# Patient Record
Sex: FEMALE | Race: WHITE | Employment: OTHER | URBAN - METROPOLITAN AREA
[De-identification: names, ages, dates, MRNs, and addresses within clinical notes are randomized per-mention and may not be internally consistent; named-entity substitution may affect disease eponyms.]

---

## 2018-05-10 ENCOUNTER — APPOINTMENT (OUTPATIENT)
Dept: GENERAL RADIOLOGY | Age: 73
DRG: 189 | End: 2018-05-10
Attending: EMERGENCY MEDICINE
Payer: MEDICARE

## 2018-05-10 ENCOUNTER — HOSPITAL ENCOUNTER (INPATIENT)
Age: 73
LOS: 5 days | Discharge: HOME | DRG: 189 | End: 2018-05-15
Attending: EMERGENCY MEDICINE | Admitting: INTERNAL MEDICINE
Payer: MEDICARE

## 2018-05-10 DIAGNOSIS — J44.1 ACUTE EXACERBATION OF CHRONIC OBSTRUCTIVE PULMONARY DISEASE (COPD) (HCC): ICD-10-CM

## 2018-05-10 DIAGNOSIS — J96.01 ACUTE RESPIRATORY FAILURE WITH HYPOXIA (HCC): Primary | ICD-10-CM

## 2018-05-10 PROBLEM — J96.90 RESPIRATORY FAILURE (HCC): Status: ACTIVE | Noted: 2018-05-10

## 2018-05-10 LAB
ALBUMIN SERPL-MCNC: 3.6 G/DL (ref 3.5–5)
ALBUMIN/GLOB SERPL: 0.9 {RATIO} (ref 1.1–2.2)
ALP SERPL-CCNC: 75 U/L (ref 45–117)
ALT SERPL-CCNC: 26 U/L (ref 12–78)
ANION GAP SERPL CALC-SCNC: 7 MMOL/L (ref 5–15)
AST SERPL-CCNC: 29 U/L (ref 15–37)
ATRIAL RATE: 98 BPM
BASOPHILS # BLD: 0 K/UL (ref 0–0.1)
BASOPHILS NFR BLD: 1 % (ref 0–1)
BILIRUB SERPL-MCNC: 0.6 MG/DL (ref 0.2–1)
BNP SERPL-MCNC: 46 PG/ML (ref 0–125)
BUN SERPL-MCNC: 28 MG/DL (ref 6–20)
BUN/CREAT SERPL: 29 (ref 12–20)
CALCIUM SERPL-MCNC: 9.1 MG/DL (ref 8.5–10.1)
CALCULATED P AXIS, ECG09: 118 DEGREES
CALCULATED R AXIS, ECG10: 35 DEGREES
CALCULATED T AXIS, ECG11: 56 DEGREES
CHLORIDE SERPL-SCNC: 106 MMOL/L (ref 97–108)
CK MB CFR SERPL CALC: 1.6 % (ref 0–2.5)
CK MB SERPL-MCNC: 2.6 NG/ML (ref 5–25)
CK SERPL-CCNC: 160 U/L (ref 26–192)
CO2 SERPL-SCNC: 24 MMOL/L (ref 21–32)
CREAT SERPL-MCNC: 0.96 MG/DL (ref 0.55–1.02)
DIAGNOSIS, 93000: NORMAL
DIFFERENTIAL METHOD BLD: NORMAL
EOSINOPHIL # BLD: 0.4 K/UL (ref 0–0.4)
EOSINOPHIL NFR BLD: 5 % (ref 0–7)
ERYTHROCYTE [DISTWIDTH] IN BLOOD BY AUTOMATED COUNT: 11.9 % (ref 11.5–14.5)
EST. AVERAGE GLUCOSE BLD GHB EST-MCNC: 157 MG/DL
GLOBULIN SER CALC-MCNC: 3.8 G/DL (ref 2–4)
GLUCOSE BLD STRIP.AUTO-MCNC: 282 MG/DL (ref 65–100)
GLUCOSE BLD STRIP.AUTO-MCNC: 339 MG/DL (ref 65–100)
GLUCOSE BLD STRIP.AUTO-MCNC: 401 MG/DL (ref 65–100)
GLUCOSE SERPL-MCNC: 271 MG/DL (ref 65–100)
HBA1C MFR BLD: 7.1 % (ref 4.2–6.3)
HCT VFR BLD AUTO: 37.6 % (ref 35–47)
HGB BLD-MCNC: 12.8 G/DL (ref 11.5–16)
IMM GRANULOCYTES # BLD: 0 K/UL (ref 0–0.04)
IMM GRANULOCYTES NFR BLD AUTO: 0 % (ref 0–0.5)
LYMPHOCYTES # BLD: 1.9 K/UL (ref 0.8–3.5)
LYMPHOCYTES NFR BLD: 24 % (ref 12–49)
MAGNESIUM SERPL-MCNC: 1.9 MG/DL (ref 1.6–2.4)
MCH RBC QN AUTO: 31.7 PG (ref 26–34)
MCHC RBC AUTO-ENTMCNC: 34 G/DL (ref 30–36.5)
MCV RBC AUTO: 93.1 FL (ref 80–99)
MONOCYTES # BLD: 0.5 K/UL (ref 0–1)
MONOCYTES NFR BLD: 6 % (ref 5–13)
NEUTS SEG # BLD: 5 K/UL (ref 1.8–8)
NEUTS SEG NFR BLD: 64 % (ref 32–75)
NRBC # BLD: 0 K/UL (ref 0–0.01)
NRBC BLD-RTO: 0 PER 100 WBC
P-R INTERVAL, ECG05: 128 MS
PLATELET # BLD AUTO: 155 K/UL (ref 150–400)
PMV BLD AUTO: 10.5 FL (ref 8.9–12.9)
POTASSIUM SERPL-SCNC: 4.6 MMOL/L (ref 3.5–5.1)
PROT SERPL-MCNC: 7.4 G/DL (ref 6.4–8.2)
Q-T INTERVAL, ECG07: 350 MS
QRS DURATION, ECG06: 84 MS
QTC CALCULATION (BEZET), ECG08: 446 MS
RBC # BLD AUTO: 4.04 M/UL (ref 3.8–5.2)
SERVICE CMNT-IMP: ABNORMAL
SODIUM SERPL-SCNC: 137 MMOL/L (ref 136–145)
TROPONIN I SERPL-MCNC: <0.04 NG/ML
VENTRICULAR RATE, ECG03: 98 BPM
WBC # BLD AUTO: 7.8 K/UL (ref 3.6–11)

## 2018-05-10 PROCEDURE — 80053 COMPREHEN METABOLIC PANEL: CPT | Performed by: EMERGENCY MEDICINE

## 2018-05-10 PROCEDURE — 82962 GLUCOSE BLOOD TEST: CPT

## 2018-05-10 PROCEDURE — 77030029684 HC NEB SM VOL KT MONA -A

## 2018-05-10 PROCEDURE — 74011000250 HC RX REV CODE- 250: Performed by: INTERNAL MEDICINE

## 2018-05-10 PROCEDURE — 71045 X-RAY EXAM CHEST 1 VIEW: CPT

## 2018-05-10 PROCEDURE — 94640 AIRWAY INHALATION TREATMENT: CPT

## 2018-05-10 PROCEDURE — 96374 THER/PROPH/DIAG INJ IV PUSH: CPT

## 2018-05-10 PROCEDURE — 99285 EMERGENCY DEPT VISIT HI MDM: CPT

## 2018-05-10 PROCEDURE — 84484 ASSAY OF TROPONIN QUANT: CPT | Performed by: EMERGENCY MEDICINE

## 2018-05-10 PROCEDURE — 74011000250 HC RX REV CODE- 250: Performed by: EMERGENCY MEDICINE

## 2018-05-10 PROCEDURE — 36415 COLL VENOUS BLD VENIPUNCTURE: CPT | Performed by: EMERGENCY MEDICINE

## 2018-05-10 PROCEDURE — 74011250636 HC RX REV CODE- 250/636: Performed by: INTERNAL MEDICINE

## 2018-05-10 PROCEDURE — 77010033678 HC OXYGEN DAILY

## 2018-05-10 PROCEDURE — 93005 ELECTROCARDIOGRAM TRACING: CPT

## 2018-05-10 PROCEDURE — 83735 ASSAY OF MAGNESIUM: CPT | Performed by: EMERGENCY MEDICINE

## 2018-05-10 PROCEDURE — 74011636637 HC RX REV CODE- 636/637: Performed by: INTERNAL MEDICINE

## 2018-05-10 PROCEDURE — 74011250637 HC RX REV CODE- 250/637: Performed by: INTERNAL MEDICINE

## 2018-05-10 PROCEDURE — 74011250636 HC RX REV CODE- 250/636: Performed by: EMERGENCY MEDICINE

## 2018-05-10 PROCEDURE — 83036 HEMOGLOBIN GLYCOSYLATED A1C: CPT | Performed by: INTERNAL MEDICINE

## 2018-05-10 PROCEDURE — 83880 ASSAY OF NATRIURETIC PEPTIDE: CPT | Performed by: EMERGENCY MEDICINE

## 2018-05-10 PROCEDURE — 85025 COMPLETE CBC W/AUTO DIFF WBC: CPT | Performed by: EMERGENCY MEDICINE

## 2018-05-10 PROCEDURE — 82550 ASSAY OF CK (CPK): CPT | Performed by: EMERGENCY MEDICINE

## 2018-05-10 PROCEDURE — 65660000000 HC RM CCU STEPDOWN

## 2018-05-10 RX ORDER — FLUTICASONE FUROATE AND VILANTEROL 200; 25 UG/1; UG/1
1 POWDER RESPIRATORY (INHALATION) DAILY
Status: DISCONTINUED | OUTPATIENT
Start: 2018-05-10 | End: 2018-05-15 | Stop reason: HOSPADM

## 2018-05-10 RX ORDER — GLIMEPIRIDE 1 MG/1
2 TABLET ORAL
Status: DISCONTINUED | OUTPATIENT
Start: 2018-05-10 | End: 2018-05-15 | Stop reason: HOSPADM

## 2018-05-10 RX ORDER — INSULIN LISPRO 100 [IU]/ML
INJECTION, SOLUTION INTRAVENOUS; SUBCUTANEOUS
Status: DISCONTINUED | OUTPATIENT
Start: 2018-05-10 | End: 2018-05-15 | Stop reason: HOSPADM

## 2018-05-10 RX ORDER — ALBUTEROL SULFATE 0.83 MG/ML
5 SOLUTION RESPIRATORY (INHALATION)
Status: COMPLETED | OUTPATIENT
Start: 2018-05-10 | End: 2018-05-10

## 2018-05-10 RX ORDER — ASPIRIN 81 MG/1
81 TABLET ORAL DAILY
Status: DISCONTINUED | OUTPATIENT
Start: 2018-05-11 | End: 2018-05-15 | Stop reason: HOSPADM

## 2018-05-10 RX ORDER — SERTRALINE HYDROCHLORIDE 50 MG/1
200 TABLET, FILM COATED ORAL DAILY
Status: DISCONTINUED | OUTPATIENT
Start: 2018-05-10 | End: 2018-05-10

## 2018-05-10 RX ORDER — LEVALBUTEROL INHALATION SOLUTION 1.25 MG/3ML
1.25 SOLUTION RESPIRATORY (INHALATION)
Status: DISCONTINUED | OUTPATIENT
Start: 2018-05-10 | End: 2018-05-13

## 2018-05-10 RX ORDER — LANOLIN ALCOHOL/MO/W.PET/CERES
1000 CREAM (GRAM) TOPICAL DAILY
COMMUNITY

## 2018-05-10 RX ORDER — ENOXAPARIN SODIUM 100 MG/ML
40 INJECTION SUBCUTANEOUS EVERY 24 HOURS
Status: DISCONTINUED | OUTPATIENT
Start: 2018-05-10 | End: 2018-05-15 | Stop reason: HOSPADM

## 2018-05-10 RX ORDER — ASPIRIN 325 MG
325 TABLET, DELAYED RELEASE (ENTERIC COATED) ORAL DAILY
Status: DISCONTINUED | OUTPATIENT
Start: 2018-05-10 | End: 2018-05-10 | Stop reason: DRUGHIGH

## 2018-05-10 RX ORDER — PIOGLITAZONEHYDROCHLORIDE 15 MG/1
15 TABLET ORAL DAILY
COMMUNITY
End: 2021-01-07

## 2018-05-10 RX ORDER — SODIUM CHLORIDE 0.9 % (FLUSH) 0.9 %
5-10 SYRINGE (ML) INJECTION AS NEEDED
Status: DISCONTINUED | OUTPATIENT
Start: 2018-05-10 | End: 2018-05-15 | Stop reason: HOSPADM

## 2018-05-10 RX ORDER — GLIPIZIDE 5 MG/1
10 TABLET ORAL
Status: DISCONTINUED | OUTPATIENT
Start: 2018-05-10 | End: 2018-05-10

## 2018-05-10 RX ORDER — NALOXONE HYDROCHLORIDE 0.4 MG/ML
0.4 INJECTION, SOLUTION INTRAMUSCULAR; INTRAVENOUS; SUBCUTANEOUS AS NEEDED
Status: DISCONTINUED | OUTPATIENT
Start: 2018-05-10 | End: 2018-05-15 | Stop reason: HOSPADM

## 2018-05-10 RX ORDER — SERTRALINE HYDROCHLORIDE 50 MG/1
150 TABLET, FILM COATED ORAL DAILY
Status: DISCONTINUED | OUTPATIENT
Start: 2018-05-11 | End: 2018-05-15 | Stop reason: HOSPADM

## 2018-05-10 RX ORDER — LISINOPRIL 5 MG/1
5 TABLET ORAL DAILY
Status: DISCONTINUED | OUTPATIENT
Start: 2018-05-10 | End: 2018-05-15 | Stop reason: HOSPADM

## 2018-05-10 RX ORDER — DOXYCYCLINE HYCLATE 100 MG
100 TABLET ORAL EVERY 12 HOURS
Status: DISCONTINUED | OUTPATIENT
Start: 2018-05-10 | End: 2018-05-10

## 2018-05-10 RX ORDER — FLUTICASONE FUROATE AND VILANTEROL 100; 25 UG/1; UG/1
1 POWDER RESPIRATORY (INHALATION) DAILY
Status: DISCONTINUED | OUTPATIENT
Start: 2018-05-10 | End: 2018-05-10 | Stop reason: SDUPTHER

## 2018-05-10 RX ORDER — ACETAMINOPHEN 325 MG/1
650 TABLET ORAL
Status: DISCONTINUED | OUTPATIENT
Start: 2018-05-10 | End: 2018-05-15 | Stop reason: HOSPADM

## 2018-05-10 RX ORDER — HYDRALAZINE HYDROCHLORIDE 20 MG/ML
10 INJECTION INTRAMUSCULAR; INTRAVENOUS
Status: DISCONTINUED | OUTPATIENT
Start: 2018-05-10 | End: 2018-05-15 | Stop reason: HOSPADM

## 2018-05-10 RX ORDER — IPRATROPIUM BROMIDE AND ALBUTEROL SULFATE 2.5; .5 MG/3ML; MG/3ML
3 SOLUTION RESPIRATORY (INHALATION)
Status: COMPLETED | OUTPATIENT
Start: 2018-05-10 | End: 2018-05-10

## 2018-05-10 RX ORDER — IPRATROPIUM BROMIDE AND ALBUTEROL SULFATE 2.5; .5 MG/3ML; MG/3ML
3 SOLUTION RESPIRATORY (INHALATION)
Status: DISCONTINUED | OUTPATIENT
Start: 2018-05-10 | End: 2018-05-10

## 2018-05-10 RX ORDER — MAGNESIUM SULFATE 100 %
4 CRYSTALS MISCELLANEOUS AS NEEDED
Status: DISCONTINUED | OUTPATIENT
Start: 2018-05-10 | End: 2018-05-15 | Stop reason: HOSPADM

## 2018-05-10 RX ORDER — ALPRAZOLAM 0.5 MG/1
0.5 TABLET ORAL
Status: DISCONTINUED | OUTPATIENT
Start: 2018-05-10 | End: 2018-05-15 | Stop reason: HOSPADM

## 2018-05-10 RX ORDER — GUAIFENESIN 600 MG/1
600 TABLET, EXTENDED RELEASE ORAL EVERY 12 HOURS
Status: DISCONTINUED | OUTPATIENT
Start: 2018-05-10 | End: 2018-05-14

## 2018-05-10 RX ORDER — DEXTROSE 50 % IN WATER (D50W) INTRAVENOUS SYRINGE
12.5-25 AS NEEDED
Status: DISCONTINUED | OUTPATIENT
Start: 2018-05-10 | End: 2018-05-15 | Stop reason: HOSPADM

## 2018-05-10 RX ORDER — SODIUM CHLORIDE 0.9 % (FLUSH) 0.9 %
5-10 SYRINGE (ML) INJECTION EVERY 8 HOURS
Status: DISCONTINUED | OUTPATIENT
Start: 2018-05-10 | End: 2018-05-15 | Stop reason: HOSPADM

## 2018-05-10 RX ORDER — PIOGLITAZONEHYDROCHLORIDE 15 MG/1
15 TABLET ORAL
Status: DISCONTINUED | OUTPATIENT
Start: 2018-05-11 | End: 2018-05-15 | Stop reason: HOSPADM

## 2018-05-10 RX ORDER — ASPIRIN 81 MG/1
81 TABLET ORAL DAILY
COMMUNITY

## 2018-05-10 RX ORDER — ATORVASTATIN CALCIUM 10 MG/1
20 TABLET, FILM COATED ORAL DAILY
Status: DISCONTINUED | OUTPATIENT
Start: 2018-05-10 | End: 2018-05-15 | Stop reason: HOSPADM

## 2018-05-10 RX ORDER — LEVOFLOXACIN 250 MG/1
250 TABLET ORAL EVERY 24 HOURS
Status: DISCONTINUED | OUTPATIENT
Start: 2018-05-11 | End: 2018-05-12 | Stop reason: DRUGHIGH

## 2018-05-10 RX ORDER — FACIAL-BODY WIPES
10 EACH TOPICAL DAILY PRN
Status: DISCONTINUED | OUTPATIENT
Start: 2018-05-10 | End: 2018-05-15 | Stop reason: HOSPADM

## 2018-05-10 RX ORDER — ONDANSETRON 2 MG/ML
4 INJECTION INTRAMUSCULAR; INTRAVENOUS
Status: DISCONTINUED | OUTPATIENT
Start: 2018-05-10 | End: 2018-05-15 | Stop reason: HOSPADM

## 2018-05-10 RX ORDER — BUPROPION HYDROCHLORIDE 300 MG/1
300 TABLET ORAL
COMMUNITY

## 2018-05-10 RX ORDER — LEVOFLOXACIN 500 MG/1
500 TABLET, FILM COATED ORAL EVERY 24 HOURS
Status: DISCONTINUED | OUTPATIENT
Start: 2018-05-10 | End: 2018-05-10 | Stop reason: SDUPTHER

## 2018-05-10 RX ORDER — GLIMEPIRIDE 2 MG/1
2 TABLET ORAL
COMMUNITY

## 2018-05-10 RX ORDER — GLIPIZIDE 10 MG/1
2 TABLET ORAL 2 TIMES DAILY
COMMUNITY
End: 2018-05-10

## 2018-05-10 RX ORDER — ATORVASTATIN CALCIUM 20 MG/1
20 TABLET, FILM COATED ORAL DAILY
COMMUNITY

## 2018-05-10 RX ORDER — LISINOPRIL 10 MG/1
5 TABLET ORAL DAILY
COMMUNITY
End: 2021-01-07

## 2018-05-10 RX ORDER — LISINOPRIL 5 MG/1
10 TABLET ORAL DAILY
Status: DISCONTINUED | OUTPATIENT
Start: 2018-05-10 | End: 2018-05-10

## 2018-05-10 RX ORDER — LEVOFLOXACIN 500 MG/1
500 TABLET, FILM COATED ORAL ONCE
Status: COMPLETED | OUTPATIENT
Start: 2018-05-10 | End: 2018-05-10

## 2018-05-10 RX ORDER — MELATONIN
1000 DAILY
COMMUNITY

## 2018-05-10 RX ORDER — HYDROCODONE POLISTIREX AND CHLORPHENIRAMINE POLISTIREX 10; 8 MG/5ML; MG/5ML
5 SUSPENSION, EXTENDED RELEASE ORAL
Status: DISCONTINUED | OUTPATIENT
Start: 2018-05-10 | End: 2018-05-14

## 2018-05-10 RX ORDER — BUPROPION HYDROCHLORIDE 150 MG/1
300 TABLET ORAL
Status: DISCONTINUED | OUTPATIENT
Start: 2018-05-10 | End: 2018-05-15 | Stop reason: HOSPADM

## 2018-05-10 RX ADMIN — INSULIN LISPRO 7 UNITS: 100 INJECTION, SOLUTION INTRAVENOUS; SUBCUTANEOUS at 16:58

## 2018-05-10 RX ADMIN — BUPROPION HYDROCHLORIDE 300 MG: 150 TABLET, FILM COATED, EXTENDED RELEASE ORAL at 11:14

## 2018-05-10 RX ADMIN — LEVOFLOXACIN 500 MG: 500 TABLET, FILM COATED ORAL at 11:15

## 2018-05-10 RX ADMIN — ATORVASTATIN CALCIUM 20 MG: 20 TABLET, FILM COATED ORAL at 11:23

## 2018-05-10 RX ADMIN — GUAIFENESIN 600 MG: 600 TABLET, EXTENDED RELEASE ORAL at 21:19

## 2018-05-10 RX ADMIN — LEVALBUTEROL HYDROCHLORIDE 1.25 MG: 1.25 SOLUTION RESPIRATORY (INHALATION) at 09:22

## 2018-05-10 RX ADMIN — GLIMEPIRIDE 2 MG: 1 TABLET ORAL at 11:14

## 2018-05-10 RX ADMIN — LEVALBUTEROL HYDROCHLORIDE 1.25 MG: 1.25 SOLUTION RESPIRATORY (INHALATION) at 15:30

## 2018-05-10 RX ADMIN — INSULIN LISPRO 3 UNITS: 100 INJECTION, SOLUTION INTRAVENOUS; SUBCUTANEOUS at 21:57

## 2018-05-10 RX ADMIN — LEVALBUTEROL HYDROCHLORIDE 1.25 MG: 1.25 SOLUTION RESPIRATORY (INHALATION) at 19:52

## 2018-05-10 RX ADMIN — INSULIN LISPRO 10 UNITS: 100 INJECTION, SOLUTION INTRAVENOUS; SUBCUTANEOUS at 11:37

## 2018-05-10 RX ADMIN — GUAIFENESIN 600 MG: 600 TABLET, EXTENDED RELEASE ORAL at 09:10

## 2018-05-10 RX ADMIN — METHYLPREDNISOLONE SODIUM SUCCINATE 40 MG: 40 INJECTION, POWDER, FOR SOLUTION INTRAMUSCULAR; INTRAVENOUS at 16:58

## 2018-05-10 RX ADMIN — REGULAR STRENGTH 325 MG: 325 TABLET ORAL at 11:13

## 2018-05-10 RX ADMIN — FLUTICASONE FUROATE AND VILANTEROL TRIFENATATE 1 PUFF: 200; 25 POWDER RESPIRATORY (INHALATION) at 11:14

## 2018-05-10 RX ADMIN — LEVALBUTEROL HYDROCHLORIDE 1.25 MG: 1.25 SOLUTION RESPIRATORY (INHALATION) at 23:54

## 2018-05-10 RX ADMIN — ALPRAZOLAM 0.5 MG: 0.5 TABLET ORAL at 21:19

## 2018-05-10 RX ADMIN — LISINOPRIL 5 MG: 5 TABLET ORAL at 11:15

## 2018-05-10 RX ADMIN — METHYLPREDNISOLONE SODIUM SUCCINATE 40 MG: 40 INJECTION, POWDER, FOR SOLUTION INTRAMUSCULAR; INTRAVENOUS at 23:52

## 2018-05-10 RX ADMIN — ENOXAPARIN SODIUM 40 MG: 40 INJECTION SUBCUTANEOUS at 09:10

## 2018-05-10 RX ADMIN — SERTRALINE HYDROCHLORIDE 200 MG: 50 TABLET, FILM COATED ORAL at 11:15

## 2018-05-10 RX ADMIN — ALBUTEROL SULFATE 5 MG: 2.5 SOLUTION RESPIRATORY (INHALATION) at 08:04

## 2018-05-10 RX ADMIN — Medication 10 ML: at 09:10

## 2018-05-10 RX ADMIN — METHYLPREDNISOLONE SODIUM SUCCINATE 125 MG: 125 INJECTION, POWDER, FOR SOLUTION INTRAMUSCULAR; INTRAVENOUS at 07:19

## 2018-05-10 RX ADMIN — LEVALBUTEROL HYDROCHLORIDE 1.25 MG: 1.25 SOLUTION RESPIRATORY (INHALATION) at 12:41

## 2018-05-10 RX ADMIN — Medication 10 ML: at 15:00

## 2018-05-10 RX ADMIN — IPRATROPIUM BROMIDE AND ALBUTEROL SULFATE 3 ML: .5; 3 SOLUTION RESPIRATORY (INHALATION) at 07:20

## 2018-05-10 NOTE — ED NOTES
TRANSFER - IN REPORT:    Verbal report received from [address] [place] [address] (name) on Skyler Harrison  being received from Neuro (unit) for routine progression of care      Report consisted of patients Situation, Background, Assessment and   Recommendations(SBAR). Information from the following report(s) SBAR, ED Summary and Recent Results was reviewed with the receiving nurse. Opportunity for questions and clarification was provided. Assessment completed upon patients arrival to unit and care assumed.

## 2018-05-10 NOTE — ED NOTES
Pt presents to ed wheezy with cough, + SOB.  Pt had taken nebulizer x4 and in hailer on the way to hospital. Pt st she has not been feeling well for \"a couple of days\"

## 2018-05-10 NOTE — PROGRESS NOTES
Pharmacy Medication Reconciliation     The patient was interviewed regarding current PTA medication list, use and drug allergies;  patient only was present in room and obtained permission from patient to discuss drug regimen with visitor(s) present. The patient was questioned regarding use of any other inhalers, topical products, over the counter medications, herbal medications, vitamin products or ophthalmic/nasal/otic medication use. Allergy Update: metformin    Recommendations/Findings: The following amendments were made to the patient's active medication list on file at [address]:   1) Additions:   +robitussin DM-5 ml by mouth q 4hr prn cough  +cyanocobalamin (B12) 1000 units by mouth daily  +vitamin D 1000 units by mouth daily  +pioglitazone 15mg PO daily    2) Deletions:   -tussionex prn: completed therapy      3) Changes:   (Old regimen: Aspirin 325mg po daily /New regimen: aspirin 81mg DR daily)  (Old regimen: sertraline 200mg PO daily /New regimen: sertraline 150mg PO daily)        -Clarified PTA med list with patient interview, Rx surescripts query, and patient's medication list. PTA medication list was corrected to the following:   Note: put frequency of BiD PRN on alprazolam PTA med list as well. Prior to Admission Medications   Prescriptions Last Dose Informant Patient Reported? Taking? ALPRAZolam (XANAX) 0.5 mg tablet 5/10/2018 at Unknown time Self Yes Yes   Sig: Take 0.5 mg by mouth as needed. albuterol (PROVENTIL, VENTOLIN) 90 mcg/actuation inhaler 5/10/2018 at Unknown time Self No Yes   Sig: Take 1-2 Puffs by inhalation every four (4) hours as needed for Wheezing. albuterol-ipratropium (DUONEB) 2.5 mg-0.5 mg/3 ml nebulizer solution 5/10/2018 at Unknown time Self Yes Yes   Sig: 3 mL by Nebulization route every four (4) hours as needed for Wheezing. aspirin delayed-release 81 mg tablet 5/10/2018 at Unknown time Self Yes Yes   Sig: Take 81 mg by mouth daily.    atorvastatin (LIPITOR) 20 mg tablet 5/9/2018 at Unknown time Self Yes Yes   Sig: Take 20 mg by mouth daily. buPROPion XL (WELLBUTRIN XL) 300 mg XL tablet 5/10/2018 at Unknown time Self Yes Yes   Sig: Take 300 mg by mouth every morning. cholecalciferol (VITAMIN D3) 1,000 unit tablet 5/9/2018 at Unknown time Self Yes Yes   Sig: Take 1,000 Units by mouth daily. cyanocobalamin 1,000 mcg tablet 5/9/2018 at 0900 Self Yes Yes   Sig: Take 1,000 mcg by mouth daily. dextromethorphan-guaiFENesin (ROBITUSSIN-DM)  mg/5 mL syrup  at . .. Self Yes Yes   Sig: Take 10 mL by mouth every four (4) hours as needed for Cough. fluticasone-salmeterol (ADVAIR DISKUS) 500-50 mcg/dose diskus inhaler 5/10/2018 at Unknown time Self Yes Yes   Sig: Take 1 Puff by inhalation every twelve (12) hours. glimepiride (AMARYL) 2 mg tablet 5/10/2018 at Unknown time Self Yes Yes   Sig: Take 2 mg by mouth every morning. lisinopril (PRINIVIL, ZESTRIL) 10 mg tablet 5/10/2018 at Unknown time Self Yes Yes   Sig: Take 5 mg by mouth daily. pioglitazone (ACTOS) 15 mg tablet 5/9/2018 at . .. Self Yes Yes   Sig: Take 15 mg by mouth daily. sertraline (ZOLOFT) 100 mg tablet 5/10/2018 at Unknown time Self Yes Yes   Sig: Take 150 mg by mouth daily.       Facility-Administered Medications: None          Thank you,  ANAMIKA Ng

## 2018-05-10 NOTE — PROGRESS NOTES
Pharmacy Automatic Renal Dosing Protocol - Antimicrobials    Indication for Antimicrobials: COPD    Current Regimen of Each Antimicrobial:  Levaquin 500 mg PO every 24 hours (Start Date 5/10 Day # 1)    Previous Antimicrobial Therapy:  none    Significant Cultures:   none    Radiology / Imaging results: (X-ray, CT scan or MRI):   5/10 chest xr- No acute cardiopulmonary disease. Paralysis, amputations, malnutrition: none documented    Labs:  Recent Labs      05/10/18   0718   CREA  0.96   BUN  28*   WBC  7.8     Temp (24hrs), Av.1 °F (36.7 °C), Min:98.1 °F (36.7 °C), Max:98.1 °F (36.7 °C)    Creatinine Clearance (mL/min) or Dialysis: 47.7    Impression/Plan:   · Will adjust dose to Levaquin 500 mg PO x 1, then 250 mg PO Q24H - per protocol for renal function and indication. · Antimicrobial stop date TBD  · BMP is ordered for tomorrow. Pharmacy will follow daily and adjust medications as appropriate for renal function and/or serum levels. Thank you,  Malachi Reynolds, PHARMD    Recommended duration of therapy  http://[facility]/[place]/[facility]/Pharmacy/Clinical%20Companion/Duration%20of%20ABX%20therapy. docx    Renal Dosing  http://[facility]/[place]/[facility]/Pharmacy/Clinical%20Companion/Renal%20Dosing%[id number]. pdf

## 2018-05-10 NOTE — ED NOTES
Patient states she is more short of breath after her breathing treatment. Patient is having tachypnea with wheezes throughout.

## 2018-05-10 NOTE — PROGRESS NOTES
* No surgery found *  * No surgery found *  Bedside shift change report given to Jordon Blank (oncoming nurse) by Andre Toth (offgoing nurse). Report included the following information SBAR, Kardex, Intake/Output, MAR and Recent Results. Zone Phone:   [phone]      Significant changes during shift:  Some SOB near end of shift, redirected patient to focus on breathing and to drink some water. Patient Information    Ada Guillory  68 y.o.  5/10/2018  6:59 AM by Louis Demarco MD. Ada Guillory was admitted from Home    Problem List    Patient Active Problem List    Diagnosis Date Noted    Respiratory failure ([facility] 05/10/2018    CAD (coronary artery disease), native coronary artery 02/22/2013    Hyperlipidemia LDL goal < 70 02/22/2013    NSTEMI (non-ST elevated myocardial infarction) ([facility] 02/20/2013    Acute respiratory failure ([facility] 02/19/2013    Unspecified asthma, with exacerbation 02/19/2013    Type II or unspecified type diabetes mellitus without mention of complication, uncontrolled     Other and unspecified hyperlipidemia     Unspecified essential hypertension      Past Medical History:   Diagnosis Date    Asthma     Cervical cancer ([facility] 1981    COPD     Depression with anxiety     Diabetes ([facility]     currently on no meds    Other and unspecified hyperlipidemia     Type II or unspecified type diabetes mellitus without mention of complication, uncontrolled     A1c 9.2 8/2012;  used to see Dr. Gela Marquez essential hypertension          Core Measures:    CVA: No No  CHF:No No  PNA:No No    Activity Status:    OOB to Chair Yes  Ambulated this shift Yes   Bed Rest No    Supplemental O2: (If Applicable)    NC Yes  NRB No  Venti-mask No  On 2 Liters/min-intermittent to help with ease of breathing       LINES AND DRAINS:    Central Line? No    PICC LINE? No     Urinary Catheter?  No     DVT prophylaxis:    DVT prophylaxis Med- Yes  DVT prophylaxis SCD or AMERICO- No     Wounds: (If Applicable)    Wounds- No    Patient Safety:    Falls Score Total Score: 1  Safety Level_______  Bed Alarm On? No  Sitter?  No    Plan for upcoming shift: nebulizer, prn oxygen, steroids, sugar checks         Discharge Plan: No     Active Consults:  None

## 2018-05-10 NOTE — ED NOTES
Pt ambulated to restroom with out difficulty. Pt completed nebulizer treatment. Pt continues to wheeze throughout.

## 2018-05-10 NOTE — ED PROVIDER NOTES
EMERGENCY DEPARTMENT HISTORY AND PHYSICAL EXAM      Date: 5/10/2018  Patient Name: Shirley Tomas    History of Presenting Illness     Chief Complaint   Patient presents with    Shortness of Breath     History Provided By: Patient    HPI: Shirley Tomas, 68 y.o. female with PMHx significant for COPD, DM, and asthma, presents ambulatory to the ED with cc of recent exacerbation of a chronic cough for the past several months with associated SOB and chest tightness. Per pt, she has had worsening, more frequent cough for several months. Pt reports her cough presents with intermittent productivity and had been presenting with clear sputum until recently when the sputum began to present with a yellow tinge. Pt reports the persistence of her cough has additionally resulted in increased SOB for the past two weeks with sudden exacerbation last night. Pt informs she has noted her SOB to be intermittent and increased with movements or exertion, but notes it has been constant since last night. Pt states she additionally has been experiencing moderate intensity diffuse chest tightness since last night. Pt reports she has had four breathing treatments of Albuterol-Ipratropium since midnight with no improvement in her symptoms. Pt states she is being followed by her Pulmonologist for these concerns and he recently placed her back on Advair and Ventolin which she has been compliant with. Pt denies recent steroid, antibiotic use. Pt denies any fevers, chills, nausea, vomiting, abdominal pain, chest pain, leg swelling, or palpitations. PMHx: depression, anxiety, cervical cancer   PSHx: appendectomy, cardiac catheterization   Social Hx: + EtOH; - Smoker; - Illicit Drugs    PCP: Sarahi Santoyo MD   Pulmonologist: Dr. Pranav Orozco    There are no other complaints, changes, or physical findings at this time.     Current Outpatient Prescriptions   Medication Sig Dispense Refill    glipiZIDE (GLUCOTROL) 10 mg tablet Take 2 mg by mouth two (2) times a day.  lisinopril (PRINIVIL, ZESTRIL) 10 mg tablet Take  by mouth daily.  albuterol-ipratropium (DUONEB) 2.5 mg-0.5 mg/3 ml nebulizer solution 3 mL by Nebulization route every four (4) hours as needed for Wheezing. 1 Package     aspirin delayed-release 325 mg tablet Take 1 Tab by mouth daily.  ALPRAZolam (XANAX) 0.5 mg tablet Take 0.5 mg by mouth as needed.  albuterol (PROVENTIL, VENTOLIN) 90 mcg/actuation inhaler Take 1-2 Puffs by inhalation every four (4) hours as needed for Wheezing. 17 g 1    fluticasone-salmeterol (ADVAIR DISKUS) 500-50 mcg/dose diskus inhaler Take 1 Puff by inhalation every twelve (12) hours.  sertraline (ZOLOFT) 100 mg tablet Take 200 mg by mouth daily.  chlorpheniramine-HYDROcodone (TUSSIONEX) 8-10 mg/5 mL suspension Take 5 mL by mouth every twelve (12) hours as needed for Cough. 115 mL 0     Past History     Past Medical History:  Past Medical History:   Diagnosis Date    Asthma     Cervical [facility] ([facility] 0    COPD     Depression with anxiety     Diabetes ([facility]     currently on no meds    Other and unspecified hyperlipidemia     Type II or unspecified type diabetes mellitus without mention of complication, uncontrolled     A1c 9.2 8/2012;  used to see Dr. Lorena Odell essential hypertension      Past Surgical History:  Past Surgical History:   Procedure Laterality Date    CARDIAC CATHETERIZATION  2/21/2013         HX APPENDECTOMY      HX CERVICAL FUSION      HX AURY AND BSO      for cervical cancer     Family History:  Family History   Problem Relation Age of Onset    Heart Disease Neg Hx     Stroke Neg Hx      Social History:  Social History   Substance Use Topics    Smoking status: Never Smoker    Smokeless tobacco: Never Used      Comment: exposed to second hand smoking in past    Alcohol use No      Comment: rarely a mixed drink     Allergies:   Allergies   Allergen Reactions    Metformin Nausea and Vomiting Review of Systems   Review of Systems   Constitutional: Negative for chills, fatigue and fever. HENT: Negative for congestion, rhinorrhea and sore throat. Eyes: Negative for pain, discharge and visual disturbance. Respiratory: Positive for cough, chest tightness and shortness of breath. Negative for wheezing. Cardiovascular: Negative for chest pain, palpitations and leg swelling. Gastrointestinal: Negative for abdominal pain, constipation, diarrhea, nausea and vomiting. Genitourinary: Negative for dysuria, frequency and hematuria. Musculoskeletal: Negative for arthralgias, back pain and myalgias. Skin: Negative for rash. Neurological: Negative for dizziness, weakness, light-headedness and headaches. Psychiatric/Behavioral: Negative. Physical Exam   Physical Exam   Constitutional: She is oriented to person, place, and time. She appears well-developed and well-nourished. HENT:   Head: Normocephalic and atraumatic. Eyes: EOM are normal. Right eye exhibits no discharge. Left eye exhibits no discharge. No scleral icterus. Neck: Normal range of motion. Neck supple. No tracheal deviation present. Cardiovascular: Normal rate, regular rhythm, normal heart sounds and intact distal pulses. Exam reveals no gallop and no friction rub. No murmur heard. Pulmonary/Chest: Tachypnea noted. She is in respiratory distress (mild). She has wheezes (expiratory). She has no rales. Abdominal: Soft. She exhibits no distension. There is no tenderness. Musculoskeletal: Normal range of motion. She exhibits no edema. Lymphadenopathy:     She has no cervical adenopathy. Neurological: She is alert and oriented to person, place, and time. No focal neuro deficits   Skin: Skin is warm and dry. No rash noted. Psychiatric: She has a normal mood and affect. Nursing note and vitals reviewed.     Diagnostic Study Results     Labs -     Recent Results (from the past 12 hour(s))   EKG, 12 LEAD, INITIAL    Collection Time: 05/10/18  7:07 AM   Result Value Ref Range    Ventricular Rate 98 BPM    Atrial Rate 98 BPM    P-R Interval 128 ms    QRS Duration 84 ms    Q-T Interval 350 ms    QTC Calculation (Bezet) 446 ms    Calculated P Axis 118 degrees    Calculated R Axis 35 degrees    Calculated T Axis 56 degrees    Diagnosis       Sinus rhythm with occasional premature ventricular complexes  Otherwise normal ECG  When compared with ECG of 20-FEB-2013 03:39,  premature ventricular complexes are now present  Nonspecific T wave abnormality no longer evident in Anterolateral leads     CBC WITH AUTOMATED DIFF    Collection Time: 05/10/18  7:18 AM   Result Value Ref Range    WBC 7.8 3.6 - 11.0 K/uL    RBC 4.04 3.80 - 5.20 M/uL    HGB 12.8 11.5 - 16.0 g/dL    HCT 37.6 35.0 - 47.0 %    MCV 93.1 80.0 - 99.0 FL    MCH 31.7 26.0 - 34.0 PG    MCHC 34.0 30.0 - 36.5 g/dL    RDW 11.9 11.5 - 14.5 %    PLATELET 376 128 - 814 K/uL    MPV 10.5 8.9 - 12.9 FL    NRBC 0.0 0  WBC    ABSOLUTE NRBC 0.00 0.00 - 0.01 K/uL    NEUTROPHILS 64 32 - 75 %    LYMPHOCYTES 24 12 - 49 %    MONOCYTES 6 5 - 13 %    EOSINOPHILS 5 0 - 7 %    BASOPHILS 1 0 - 1 %    IMMATURE GRANULOCYTES 0 0.0 - 0.5 %    ABS. NEUTROPHILS 5.0 1.8 - 8.0 K/UL    ABS. LYMPHOCYTES 1.9 0.8 - 3.5 K/UL    ABS. MONOCYTES 0.5 0.0 - 1.0 K/UL    ABS. EOSINOPHILS 0.4 0.0 - 0.4 K/UL    ABS. BASOPHILS 0.0 0.0 - 0.1 K/UL    ABS. IMM.  GRANS. 0.0 0.00 - 0.04 K/UL    DF AUTOMATED     METABOLIC PANEL, COMPREHENSIVE    Collection Time: 05/10/18  7:18 AM   Result Value Ref Range    Sodium 137 136 - 145 mmol/L    Potassium 4.6 3.5 - 5.1 mmol/L    Chloride 106 97 - 108 mmol/L    CO2 24 21 - 32 mmol/L    Anion gap 7 5 - 15 mmol/L    Glucose 271 (H) 65 - 100 mg/dL    BUN 28 (H) 6 - 20 MG/DL    Creatinine 0.96 0.55 - 1.02 MG/DL    BUN/Creatinine ratio 29 (H) 12 - 20      GFR est AA >60 >60 ml/min/1.73m2    GFR est non-AA 57 (L) >60 ml/min/1.73m2    Calcium 9.1 8.5 - 10.1 MG/DL Bilirubin, total 0.6 0.2 - 1.0 MG/DL    ALT (SGPT) 26 12 - 78 U/L    AST (SGOT) 29 15 - 37 U/L    Alk. phosphatase 75 45 - 117 U/L    Protein, total 7.4 6.4 - 8.2 g/dL    Albumin 3.6 3.5 - 5.0 g/dL    Globulin 3.8 2.0 - 4.0 g/dL    A-G Ratio 0.9 (L) 1.1 - 2.2     NT-PRO BNP    Collection Time: 05/10/18  7:18 AM   Result Value Ref Range    NT pro-BNP 46 0 - 125 PG/ML   MAGNESIUM    Collection Time: 05/10/18  7:18 AM   Result Value Ref Range    Magnesium 1.9 1.6 - 2.4 mg/dL   CK W/ CKMB & INDEX    Collection Time: 05/10/18  7:18 AM   Result Value Ref Range     26 - 192 U/L    CK - MB 2.6 <3.6 NG/ML    CK-MB Index 1.6 0 - 2.5     TROPONIN I    Collection Time: 05/10/18  7:18 AM   Result Value Ref Range    Troponin-I, Qt. <0.04 <0.05 ng/mL     Radiologic Studies -   XR CHEST PORT   Final Result        CXR Results  (Last 48 hours)               05/10/18 0733  XR CHEST PORT Final result    Impression:  IMPRESSION: No acute cardiopulmonary disease. Narrative:  INDICATION:   Dyspnea. Portable AP upright view of the chest.       Direct comparison made to prior chest x-ray dated September 2014. Cardiomediastinal silhouette is stable. Lungs are clear bilaterally. Pleural   spaces are normal. Osseous structures are intact. Medical Decision Making   I am the first provider for this patient. I reviewed the vital signs, available nursing notes, past medical history, past surgical history, family history and social history. Vital Signs-Reviewed the patient's vital signs.   Patient Vitals for the past 12 hrs:   Temp Pulse Resp BP SpO2   05/10/18 0804 - (!) 106 - 153/77 -   05/10/18 0800 - (!) 112 17 153/77 91 %   05/10/18 0751 - (!) 116 17 - (!) 88 %   05/10/18 0740 - - - - 90 %   05/10/18 0710 98.1 °F (36.7 °C) 97 28 158/59 92 %     Pulse Oximetry Analysis - 92% on RA    Cardiac Monitor:   Rate: 97 bpm  Rhythm: Normal Sinus Rhythm      EKG interpretation: (0201)  Rhythm: normal sinus rhythm and PVC's; and regular . Rate (approx.): 98; Axis: normal; OH interval: normal; QRS interval: normal ; ST/T wave: normal;   Written by Susan Gallegos, TERA Scribe, as dictated by Reinaldo Romberg, MD.    Records Reviewed: Nursing Notes and Old Medical Records    Provider Notes (Medical Decision Making):   DDx: COPD exacerbation, bronchitis, PNA, CHF, pulmonary edema, ACS, PE    Disposition: Patient is a 68 y.o F with history of COPD who presents to the ED with CURTIS and diffuse wheezing on exam secondary to acute COPD exacerbation. New O2 requirement; minimal improvement with duo-nebs. Given exam, do not suspect PE. Labs and CXR unremarkable. Will admit for respiratory failure, COPD exacerbation. ED Course:   Initial assessment performed. The patients presenting problems have been discussed, and they are in agreement with the care plan formulated and outlined with them. I have encouraged them to ask questions as they arise throughout their visit. Progress Note:   8:00 AM  Pt ambulated to the restroom with O2 saturation decreasing to 88% on RA. Pt continues to have expiratory wheezing. Updated pt on all returned results and findings including hospitalist consult and likely admission. Pt in agreement with the further progression of care plan and expresses agreement with and understanding of all items discussed. Written by TERA Colemanibe, as dictated by Reinaldo Romberg, MD.     CONSULT NOTE:   Lory Borrego MD spoke with Dr. Cherelle Olivier,  Specialty: Hospitalist   Discussed pt's hx, disposition, and available diagnostic and imaging results. Reviewed care plans. Consultant agrees with plans as outlined. Accepts admission.   Written by Susan Gallegos ED Scribe, as dictated by Reinaldo Romberg, MD.    Critical Care:    IMPENDING DETERIORATION -Respiratory  ASSOCIATED RISK FACTORS - Hypoxia  MANAGEMENT- Bedside Assessment and Supervision of Care  INTERPRETATION -  Xrays, ECG and Blood Pressure  INTERVENTIONS - duo-nebs, O2, solumedrol   CASE REVIEW - Hospitalist and Nursing  TREATMENT RESPONSE -Unchanged   PERFORMED BY - Self    Notes:  I have spent 40 minutes of critical care time involved in lab review, consultations with specialist, family decision- making, bedside attention and documentation. During this entire length of time I was immediately available to the patient. This note is prepared by Preeti Doty, ED Scribe, as dictated by Domingo Pickard MD.     Disposition:  ADMIT NOTE:    8:12 AM  Patient is being admitted to the hospital by Dr. Omayra Freitas. The results of their tests and reasons for their admission have been discussed with the patient and/or available family. They convey agreement and understanding for the need to be admitted and for the admission diagnosis. PLAN:  1. Admit to Hospitalist, Dr. Omayra Freitas    Diagnosis     Clinical Impression:   1. Acute respiratory failure with hypoxia ([facility]    2. Acute exacerbation of chronic obstructive pulmonary disease (COPD) ([facility]      Attestations: This note is prepared by Preeti Doty, acting as Scribe for Domingo Pickard MD.    Domingo Pickard MD: The scribe's documentation has been prepared under my direction and personally reviewed by me in its entirety. I confirm that the note above accurately reflects all work, treatment, procedures, and medical decision making performed by me. This note will not be viewable in [address]

## 2018-05-10 NOTE — PROGRESS NOTES
ADULT PROTOCOL: JET AEROSOL ASSESSMENT    Patient  Atif Sadler     68 y.o.   female     5/10/2018  3:43 PM    Breath Sounds Pre Procedure: Right Breath Sounds: Diminished, Wheezing                               Left Breath Sounds: Diminished, Wheezing    Breath Sounds Post Procedure: Right Breath Sounds: Diminished, Wheezing                                 Left Breath Sounds: Diminished, Wheezing    Breathing pattern: Pre procedure Breathing Pattern: Regular          Post procedure Breathing Pattern: Regular    Heart Rate: Pre procedure Pulse: 104           Post procedure Pulse: 113    Resp Rate: Pre procedure Respirations: 20           Post procedure Respirations: 20            Cough: Pre procedure Cough: Non-productive               Post procedure Cough: Non-productive      Oxygen: O2 Device: Room air   room air     Changed: NO    SpO2: Pre procedure SpO2: 93 %   without oxygen              Post procedure SpO2: 93 %  without oxygen    Nebulizer Therapy: Current medications Aerosolized Medications: Xopenex      Changed: NO    Smoking History: never    Problem List:   Patient Active Problem List   Diagnosis Code    Type II or unspecified type diabetes mellitus without mention of complication, uncontrolled E11.65    Other and unspecified hyperlipidemia E78.5    Unspecified essential hypertension I10    [facility] J96.00    Unspecified asthma, with exacerbation J45. 0    NSTEMI (non-ST elevated myocardial infarction) ([facility] I21.4    CAD (coronary artery disease), native coronary artery I25.10    Hyperlipidemia LDL goal < 70 E78.5    Respiratory failure ([facility] J96.90       Respiratory Therapist: Levi Boyd RT

## 2018-05-10 NOTE — H&P
Hospitalist Admission Note    NAME: Esdras Portillo   :  1945   MRN:  [id number]     Date/Time:  5/10/2018 8:29 AM    Patient PCP: Jean Oconnor MD  ______________________________________________________________________  Given the patient's current clinical presentation, I have a high level of concern for decompensation if discharged from the emergency department. Complex decision making was performed, which includes reviewing the patient's available past medical records, laboratory results, and x-ray films. My assessment of this patient's clinical condition and my plan of care is as follows. Assessment / Plan:  Acute hypoxic respiratory failure  Acute COPD exacerbation  Hx of asthma  -CXR neg for acute cardiopulmonary disease  -pt is hypoxic with SpO2 88% on RA during ambulation, improved to 93% on 2LNC  -will schedule advair, scheduled and prn xopenex to limit tachycardia  -IV solumedrol, levaquin, and antitussives  -O2 suppl, wean as tolerated. Pt was not on home O2 PTA  -pt is well known to Dr Genoveva Franks    CAD/HTN  -had cardiac cath in - non obstructive  -cont' lisinopril, ASA, statin  -hydralazine prn    T2DM  -check A1C  -cont' glimiperide, actos   -diabetic diet, sliding scale    Anxiety/depression  -cont' home xanax, sertraline, Wellbutrin       Code Status: Full  Surrogate Decision Maker: pt's  or daughter Sujata French [phone]  DVT Prophylaxis: lovenox  GI Prophylaxis: not indicated  Baseline: independent, not on home O2      Subjective:   CHIEF COMPLAINT: sob, productive cough    HISTORY OF PRESENT ILLNESS:     Esdras Portillo is a 68 y.o.  female with PMHx significant for asthma, COPD not on home O2, CAD, HTN, T2DM, anxiety/depression, HTN, present to the ED c/o progressive worsening of SOB, especially with minimum exertion. Symptoms started approx 1 month ago. Pt follows with Dr Celsa Smiley.  Last night she states SOB became severe and did not improve with duonebs x4, which lead her to the ER for further evaluation. Associated symptoms including chronic cough for x1 month with recent yellow sputum production, chest tightness, and rhinorrhea (started last night, improved with nebs). She denies any associated fever, chills, palpitations, n/v/d,  In the ER, pt initally did not required O2 suppl, however she became hypoxic on RA with SpO2 88% during ambulation. She was placed on [facility] with O2 sats 93%. Vitals:T98.1, P97, /59, SpO2 92% on RA. Pertinent labs: trop 0/04, wbc, 7.8, cr 0.96  CXR with no acute cardiopulmonary disease. We were asked to admit for work up and evaluation of the above problems. Past Medical History:   Diagnosis Date    Asthma     Cervical cancer ([facility] [address] 1981    COPD     Depression with anxiety     Diabetes ([facility] [address]     currently on no meds    Other and unspecified hyperlipidemia     Type II or unspecified type diabetes mellitus without mention of complication, uncontrolled     A1c 9.2 8/2012;  used to see Dr. Paula Maher essential hypertension         Past Surgical History:   Procedure Laterality Date    CARDIAC CATHETERIZATION  2/21/2013         HX APPENDECTOMY      HX CERVICAL FUSION      HX AURY AND BSO      for cervical cancer       Social History   Substance Use Topics    Smoking status: Never Smoker    Smokeless tobacco: Never Used      Comment: exposed to second hand smoking in past    Alcohol use No      Comment: rarely a mixed drink        Family History   Problem Relation Age of Onset    Heart Disease Neg Hx     Stroke Neg Hx      Allergies   Allergen Reactions    Metformin Nausea and Vomiting        Prior to Admission medications    Medication Sig Start Date End Date Taking? Authorizing Provider   glipiZIDE (GLUCOTROL) 10 mg tablet Take 2 mg by mouth two (2) times a day. Yes Mendoza Martinez MD   lisinopril (PRINIVIL, ZESTRIL) 10 mg tablet Take  by mouth daily.    Yes Mendoza Martinez MD albuterol-ipratropium (DUONEB) 2.5 mg-0.5 mg/3 ml nebulizer solution 3 mL by Nebulization route every four (4) hours as needed for Wheezing. 2/25/13  Yes Romel Simmons MD   aspirin delayed-release 325 mg tablet Take 1 Tab by mouth daily. 2/25/13  Yes Romel Simmons MD   ALPRAZolam Jayson Giron) 0.5 mg tablet Take 0.5 mg by mouth as needed. Yes Historical Provider   albuterol (PROVENTIL, VENTOLIN) 90 mcg/actuation inhaler Take 1-2 Puffs by inhalation every four (4) hours as needed for Wheezing. 2/18/13  Yes Ron Gomez MD   fluticasone-salmeterol (ADVAIR DISKUS) 500-50 mcg/dose diskus inhaler Take 1 Puff by inhalation every twelve (12) hours. Yes Historical Provider   sertraline (ZOLOFT) 100 mg tablet Take 200 mg by mouth daily. Yes Historical Provider   chlorpheniramine-HYDROcodone (TUSSIONEX) 8-10 mg/5 mL suspension Take 5 mL by mouth every twelve (12) hours as needed for Cough. 2/25/13   Romel Simmons MD       REVIEW OF SYSTEMS:     I am not able to complete the review of systems because:    The patient is intubated and sedated    The patient has altered mental status due to his acute medical problems    The patient has baseline aphasia from prior stroke(s)    The patient has baseline dementia and is not reliable historian    The patient is in acute medical distress and unable to provide information           Total of 12 systems reviewed as follows:       POSITIVE= BOLD text  Negative = text not BOLD  General:  fever, chills, sweats, generalized weakness, weight loss/gain,      loss of appetite   Eyes:    blurred vision, eye pain, loss of vision, double vision  ENT:    rhinorrhea, pharyngitis   Respiratory:   cough, sputum production, SOB, CURTIS, wheezing, pleuritic pain   Cardiology:   chest tightness, palpitations, orthopnea, PND, edema, syncope   Gastrointestinal:  abdominal pain , N/V, diarrhea, dysphagia, constipation, bleeding   Genitourinary:  frequency, urgency, dysuria, hematuria, incontinence Muskuloskeletal :  arthralgia, myalgia, back pain  Hematology:  easy bruising, nose or gum bleeding, lymphadenopathy   Dermatological: rash, ulceration, pruritis, color change / jaundice  Endocrine:   hot flashes or polydipsia   Neurological:  headache, dizziness, confusion, focal weakness, paresthesia,     Speech difficulties, memory loss, gait difficulty  Psychological: Feelings of anxiety, depression, agitation    Objective:   VITALS:    Visit Vitals    /77    Pulse (!) 112    Temp 98.1 °F (36.7 °C)    Resp 21    Ht 5' 5\" (1.651 m)    Wt 79.9 kg (176 lb 2.4 oz)    SpO2 93%    BMI 29.31 kg/m2       PHYSICAL EXAM:    General:    Pleasant female sitting up in bed, on 2LNC, NAD  HEENT: Atraumatic, anicteric sclerae, pink conjunctivae     No oral ulcers, mucosa moist, throat clear  Neck:  Supple, symmetrical,  thyroid: non tender  Lungs:   Diffuse wheezing throughout lung. No rales. Chest wall:  No tenderness. No accessory muscle use. Heart:   Tachycardic. No  Murmur. No edema  Abdomen:   Soft, NT. ND  BS+  Extremities: No cyanosis. No clubbing,      Skin turgor normal, Radial dial pulse 2+  Skin:     Not pale. Not Jaundiced  No rashes   Psych:   Not anxious or agitated. Neurologic: No facial asymmetry. No aphasia or slurred speech. Symmetrical strength, Sensation grossly intact.  AAOx4.     _______________________________________________________________________  Care Plan discussed with:    Comments   Patient x    Family  x Pt's  at bedside   RN x    Care Manager                    Consultant:  x ED physician   _______________________________________________________________________  Expected  Disposition:   Home with Family    HH/PT/OT/RN x   SNF/LTC    GIANFRANCO    ________________________________________________________________________  TOTAL TIME:  72 Minutes    Critical Care Provided     Minutes non procedure based      Comments    x Reviewed previous records   >50% of visit spent in counseling and coordination of care x Discussion with patient and/or family and questions answered       ________________________________________________________________________  Signed: Alonso Nuñez MD    Procedures: see electronic medical records for all procedures/Xrays and details which were not copied into this note but were reviewed prior to creation of Plan. LAB DATA REVIEWED:    Recent Results (from the past 24 hour(s))   EKG, 12 LEAD, INITIAL    Collection Time: 05/10/18  7:07 AM   Result Value Ref Range    Ventricular Rate 98 BPM    Atrial Rate 98 BPM    P-R Interval 128 ms    QRS Duration 84 ms    Q-T Interval 350 ms    QTC Calculation (Bezet) 446 ms    Calculated P Axis 118 degrees    Calculated R Axis 35 degrees    Calculated T Axis 56 degrees    Diagnosis       Sinus rhythm with occasional premature ventricular complexes  Otherwise normal ECG  When compared with ECG of 20-FEB-2013 03:39,  premature ventricular complexes are now present  Nonspecific T wave abnormality no longer evident in Anterolateral leads     CBC WITH AUTOMATED DIFF    Collection Time: 05/10/18  7:18 AM   Result Value Ref Range    WBC 7.8 3.6 - 11.0 K/uL    RBC 4.04 3.80 - 5.20 M/uL    HGB 12.8 11.5 - 16.0 g/dL    HCT 37.6 35.0 - 47.0 %    MCV 93.1 80.0 - 99.0 FL    MCH 31.7 26.0 - 34.0 PG    MCHC 34.0 30.0 - 36.5 g/dL    RDW 11.9 11.5 - 14.5 %    PLATELET 646 390 - 520 K/uL    MPV 10.5 8.9 - 12.9 FL    NRBC 0.0 0  WBC    ABSOLUTE NRBC 0.00 0.00 - 0.01 K/uL    NEUTROPHILS 64 32 - 75 %    LYMPHOCYTES 24 12 - 49 %    MONOCYTES 6 5 - 13 %    EOSINOPHILS 5 0 - 7 %    BASOPHILS 1 0 - 1 %    IMMATURE GRANULOCYTES 0 0.0 - 0.5 %    ABS. NEUTROPHILS 5.0 1.8 - 8.0 K/UL    ABS. LYMPHOCYTES 1.9 0.8 - 3.5 K/UL    ABS. MONOCYTES 0.5 0.0 - 1.0 K/UL    ABS. EOSINOPHILS 0.4 0.0 - 0.4 K/UL    ABS. BASOPHILS 0.0 0.0 - 0.1 K/UL    ABS. IMM.  GRANS. 0.0 0.00 - 0.04 K/UL    DF AUTOMATED     METABOLIC PANEL, COMPREHENSIVE    Collection Time: 05/10/18  7:18 AM   Result Value Ref Range    Sodium 137 136 - 145 mmol/L    Potassium 4.6 3.5 - 5.1 mmol/L    Chloride 106 97 - 108 mmol/L    CO2 24 21 - 32 mmol/L    Anion gap 7 5 - 15 mmol/L    Glucose 271 (H) 65 - 100 mg/dL    BUN 28 (H) 6 - 20 MG/DL    Creatinine 0.96 0.55 - 1.02 MG/DL    BUN/Creatinine ratio 29 (H) 12 - 20      GFR est AA >60 >60 ml/min/1.73m2    GFR est non-AA 57 (L) >60 ml/min/1.73m2    Calcium 9.1 8.5 - 10.1 MG/DL    Bilirubin, total 0.6 0.2 - 1.0 MG/DL    ALT (SGPT) 26 12 - 78 U/L    AST (SGOT) 29 15 - 37 U/L    Alk.  phosphatase 75 45 - 117 U/L    Protein, total 7.4 6.4 - 8.2 g/dL    Albumin 3.6 3.5 - 5.0 g/dL    Globulin 3.8 2.0 - 4.0 g/dL    A-G Ratio 0.9 (L) 1.1 - 2.2     NT-PRO BNP    Collection Time: 05/10/18  7:18 AM   Result Value Ref Range    NT pro-BNP 46 0 - 125 PG/ML   MAGNESIUM    Collection Time: 05/10/18  7:18 AM   Result Value Ref Range    Magnesium 1.9 1.6 - 2.4 mg/dL   CK W/ CKMB & INDEX    Collection Time: 05/10/18  7:18 AM   Result Value Ref Range     26 - 192 U/L    CK - MB 2.6 <3.6 NG/ML    CK-MB Index 1.6 0 - 2.5     TROPONIN I    Collection Time: 05/10/18  7:18 AM   Result Value Ref Range    Troponin-I, Qt. <0.04 <0.05 ng/mL

## 2018-05-10 NOTE — ED NOTES
TRANSFER - IN REPORT:    Verbal report received from Will Dickey. Report consisted of patients Situation, Background, Assessment and   Recommendations(SBAR). Information from the following report(s) SBAR and ED Summary was reviewed with the receiving nurse. Opportunity for questions and clarification was provided. Pt currently holding nebulizer without difficulty.

## 2018-05-10 NOTE — IP AVS SNAPSHOT
[address] [facility] 
[phone] Patient: Eder Santiago MRN: [id number] YLO:[id number] About your hospitalization You were admitted on:  May 10, 2018 You last received care in the:  [address] You were discharged on:  May 15, 2018 Why you were hospitalized Your primary diagnosis was:  Not on File Your diagnoses also included:  Respiratory Failure (Hcc) Follow-up Information Follow up With Details Comments Contact Info Roberta Lockwood MD Go on 5/22/2018 8:45AM; Pulmonology follow-up with Simón España NP [address] [facility] 
[phone] Denise Mccann MD Schedule an appointment as soon as possible for a visit in 1 week Call Sooner, As needed, If symptoms worsen [address]  
[address] [place] [address] 
[phone] Discharge Orders None A check bernice indicates which time of day the medication should be taken. My Medications START taking these medications Instructions Each Dose to Equal  
 Morning Noon Evening Bedtime Cetirizine 10 mg Cap Your last dose was: Your next dose is: Take 10 mg by mouth every evening for 30 days. 10 mg  
    
   
   
   
  
 fluticasone-vilanterol 200-25 mcg/dose inhaler Commonly known as:  BREO ELLIPTA Start taking on:  5/16/2018 Your last dose was: Your next dose is: Take 1 Puff by inhalation daily for 30 days. 1 Puff  
    
   
   
   
  
 levoFLOXacin 500 mg tablet Commonly known as:  Katauryrn McHenry Start taking on:  5/16/2018 Your last dose was: Your next dose is: Take 1 Tab by mouth every twenty-four (24) hours for 3 days. 500 mg  
    
   
   
   
  
 oxybutynin chloride XL 10 mg CR tablet Commonly known as:  DITROPAN XL Start taking on:  5/16/2018 Your last dose was: Your next dose is: Take 1 Tab by mouth daily for 30 days. 10 mg  
    
   
   
   
  
 predniSONE 10 mg tablet Commonly known as:  Rafat Chamberlain Your last dose was: Your next dose is: Take 3 tablets by mouth daily for 4 days, then take 2 tablets daily for 4 days, then take 1 tablet daily for 4 days. CHANGE how you take these medications Instructions Each Dose to Equal  
 Morning Noon Evening Bedtime  
 aspirin delayed-release 81 mg tablet What changed:  Another medication with the same name was removed. Continue taking this medication, and follow the directions you see here. Your last dose was: Your next dose is: Take 81 mg by mouth daily. 81 mg CONTINUE taking these medications Instructions Each Dose to Equal  
 Morning Noon Evening Bedtime  
 albuterol 90 mcg/actuation inhaler Commonly known as:  Malika Chen Your last dose was: Your next dose is: Take 1-2 Puffs by inhalation every four (4) hours as needed for Wheezing. 1-2 Puff  
    
   
   
   
  
 atorvastatin 20 mg tablet Commonly known as:  LIPITOR Your last dose was: Your next dose is: Take 20 mg by mouth daily. 20 mg  
    
   
   
   
  
 buPROPion  mg XL tablet Commonly known as:  Lieutenant Fogo Your last dose was: Your next dose is: Take 300 mg by mouth every morning. 300 mg  
    
   
   
   
  
 cyanocobalamin 1,000 mcg tablet Your last dose was: Your next dose is: Take 1,000 mcg by mouth daily. 1000 mcg  
    
   
   
   
  
 dextromethorphan-guaiFENesin  mg/5 mL syrup Commonly known as:  ROBITUSSIN-DM Your last dose was: Your next dose is: Take 10 mL by mouth every four (4) hours as needed for Cough. 10 mL DUONEB 2.5 mg-0.5 mg/3 ml Nebu Generic drug:  albuterol-ipratropium Your last dose was: Your next dose is:    
   
   
 3 mL by Nebulization route every four (4) hours as needed for Wheezing. 3 mL  
    
   
   
   
  
 glimepiride 2 mg tablet Commonly known as:  AMARYL Your last dose was: Your next dose is: Take 2 mg by mouth every morning. 2 mg  
    
   
   
   
  
 lisinopril 10 mg tablet Commonly known as:  Wero Josh Your last dose was: Your next dose is: Take 5 mg by mouth daily. 5 mg  
    
   
   
   
  
 pioglitazone 15 mg tablet Commonly known as:  ACTOS Your last dose was: Your next dose is: Take 15 mg by mouth daily. 15 mg  
    
   
   
   
  
 sertraline 100 mg tablet Commonly known as:  ZOLOFT Your last dose was: Your next dose is: Take 150 mg by mouth daily. 150 mg  
    
   
   
   
  
 VITAMIN D3 1,000 unit tablet Generic drug:  cholecalciferol Your last dose was: Your next dose is: Take 1,000 Units by mouth daily. 1000 Units XANAX 0.5 mg tablet Generic drug:  ALPRAZolam  
   
Your last dose was: Your next dose is: Take 0.5 mg by mouth two (2) times daily as needed. 0.5 mg  
    
   
   
   
  
  
STOP taking these medications ADVAIR DISKUS 500-50 mcg/dose diskus inhaler Generic drug:  fluticasone-salmeterol Where to Get Your Medications Information on where to get these meds will be given to you by the nurse or doctor. ! Ask your nurse or doctor about these medications Cetirizine 10 mg Cap  
 fluticasone-vilanterol 200-25 mcg/dose inhaler  
 levoFLOXacin 500 mg tablet  
 oxybutynin chloride XL 10 mg CR tablet  
 predniSONE 10 mg tablet Discharge Instructions DISCHARGE DIAGNOSIS: 
 Acute hypoxic/hypercarbic respiratory failure due to severe persistent asthma with acute exacerbation Non insulin dependent Diabetes mellitus type 2 uncontrolled with steroid-induced hyperglycemia Benign Hypertension and coronary artery disease Anxiety with depression MEDICATIONS: 
· It is important that you take the medication exactly as they are prescribed. · Keep your medication in the bottles provided by the pharmacist and keep a list of the medication names, dosages, and times to be taken in your wallet. · Do not take other medications without consulting your doctor. Pain Management: per above medications What to do at [facility] Recommended diet:  Cardiac Diet Recommended activity: Activity as tolerated If you have questions regarding the hospital related prescriptions or hospital related issues please call [address] [place] [address] at . You can always direct your questions to your primary care doctor if you are unable to reach your hospital physician; your PCP works as an extension of your hospital doctor just like your hospital doctor is an extension of your PCP for your time at the hospital [facility], [facility], [place]). If you experience any of the following symptoms then please call your primary care physician or return to the emergency room if you cannot get hold of your doctor: 
Fever, chills, nausea, vomiting, diarrhea, change in mentation, falling, bleeding, shortness of breath Take your medicine as ordered NOTE that I added an allergy medicine to help with the likely allergies you have MyChart Announcement We are excited to announce that we are making your provider's discharge notes available to you in PlanetTran. You will see these notes when they are completed and signed by the physician that discharged you from your recent hospital stay.   If you have any questions or concerns about any information you see in Runte[facility], please call the [facility] Department where you were seen or reach out to your Primary Care Provider for more information about your plan of care. Introducing [facility]! Dear Halima Gr: Thank you for requesting a Crossborders account. Our records indicate that you already have an active Crossborders account. You can access your account anytime at [email or web address] Did you know that you can access your hospital and ER discharge instructions at any time in Crossborders? You can also review all of your test results from your hospital stay or ER visit. Additional Information If you have questions, please visit the Frequently Asked Questions section of the Crossborders website at [email or web address] Remember, Crossborders is NOT to be used for urgent needs. For medical emergencies, dial 911. Now available from your iPhone and Android! Introducing Gustavo Chakraborty As a [employer] patient, I wanted to make you aware of our electronic visit tool called Gustavo Palmer. [employer] allows you to connect within minutes with a medical provider 24 hours a day, seven days a week via a mobile device or tablet or logging into a secure website from your computer. You can access Gustavo [employer] from anywhere in the [place]. A virtual visit might be right for you when you have a simple condition and feel like you just dont want to get out of bed, or cant get away from work for an appointment, when your regular [employer] provider is not available (evenings, weekends or holidays), or when youre out of town and need minor care. Electronic visits cost only $49 and if the [employer] provider determines a prescription is needed to treat your condition, one can be electronically transmitted to a nearby pharmacy*. Please take a moment to enroll today if you have not already done so. The enrollment process is free and takes just a few minutes.   To enroll, please download the [employer] 24/7 adina to your tablet or phone, or visit [email or web address] to enroll on your computer. And, as an [address] [place] [address] patient with a P[employer]eline account, the results of your visits will be scanned into your electronic medical record and your primary care provider will be able to view the scanned results. We urge you to continue to see your regular [employer] provider for your ongoing medical care. And while your primary care provider may not be the one available when you seek a [employer] virtual visit, the peace of mind you get from getting a real diagnosis real time can be priceless. For more information on [employer], view our Frequently Asked Questions (FAQs) at [email or web address]. Sincerely, 
 
Stacy Bee MD 
Chief Medical Officer [facility] Nicole Hunter *:  certain medications cannot be prescribed via Ticket ABC Providers Seen During Your Hospitalization Provider Specialty Primary office phone Kenney Rose MD Emergency Medicine [phone] Annamaria Kwok MD Internal Medicine [phone] Naga Diaz MD Internal Medicine [phone] Your Primary Care Physician (PCP) Primary Care Physician Office Phone Office Fax Luli Montez [phone] You are allergic to the following Allergen Reactions Metformin Nausea and Vomiting Recent Documentation Height Weight BMI Smoking Status 1.651 m 79.7 kg 29.22 kg/m2 Never Smoker Emergency Contacts Name Discharge Info Relation Home Work Mobile [facility] DISCHARGE CAREGIVER [3] Child [2] [phone] Patient Belongings  The following personal items are in your possession at time of discharge: 
  Dental Appliances: None  Visual Aid: Glasses, With patient      Home Medications: None   Jewelry: Ring  Clothing: Pants    Other Valuables: Cell Phone (Watch ) Please provide this summary of care documentation to your next provider. Signatures-by signing, you are acknowledging that this After Visit Summary has been reviewed with you and you have received a copy. Patient Signature:  ____________________________________________________________ Date:  ____________________________________________________________  
  
Frederick Snipe Provider Signature:  ____________________________________________________________ Date:  ____________________________________________________________

## 2018-05-11 ENCOUNTER — APPOINTMENT (OUTPATIENT)
Dept: GENERAL RADIOLOGY | Age: 73
DRG: 189 | End: 2018-05-11
Attending: INTERNAL MEDICINE
Payer: MEDICARE

## 2018-05-11 LAB
ANION GAP SERPL CALC-SCNC: 7 MMOL/L (ref 5–15)
APPEARANCE UR: ABNORMAL
ARTERIAL PATENCY WRIST A: YES
BACTERIA URNS QL MICRO: NEGATIVE /HPF
BASE DEFICIT BLDA-SCNC: 3.1 MMOL/L
BASOPHILS # BLD: 0 K/UL (ref 0–0.1)
BASOPHILS NFR BLD: 0 % (ref 0–1)
BDY SITE: ABNORMAL
BILIRUB UR QL: NEGATIVE
BUN SERPL-MCNC: 23 MG/DL (ref 6–20)
BUN/CREAT SERPL: 25 (ref 12–20)
CALCIUM SERPL-MCNC: 9.6 MG/DL (ref 8.5–10.1)
CHLORIDE SERPL-SCNC: 108 MMOL/L (ref 97–108)
CO2 SERPL-SCNC: 23 MMOL/L (ref 21–32)
COLOR UR: ABNORMAL
CREAT SERPL-MCNC: 0.91 MG/DL (ref 0.55–1.02)
DIFFERENTIAL METHOD BLD: ABNORMAL
EOSINOPHIL # BLD: 0 K/UL (ref 0–0.4)
EOSINOPHIL NFR BLD: 0 % (ref 0–7)
EPITH CASTS URNS QL MICRO: ABNORMAL /LPF
ERYTHROCYTE [DISTWIDTH] IN BLOOD BY AUTOMATED COUNT: 11.9 % (ref 11.5–14.5)
FIO2 ON VENT: 100 %
GAS FLOW.O2 O2 DELIVERY SYS: 15 L/MIN
GLUCOSE BLD STRIP.AUTO-MCNC: 145 MG/DL (ref 65–100)
GLUCOSE BLD STRIP.AUTO-MCNC: 166 MG/DL (ref 65–100)
GLUCOSE BLD STRIP.AUTO-MCNC: 239 MG/DL (ref 65–100)
GLUCOSE BLD STRIP.AUTO-MCNC: 314 MG/DL (ref 65–100)
GLUCOSE SERPL-MCNC: 318 MG/DL (ref 65–100)
GLUCOSE UR STRIP.AUTO-MCNC: 500 MG/DL
HCO3 BLDA-SCNC: 24 MMOL/L (ref 22–26)
HCT VFR BLD AUTO: 39.1 % (ref 35–47)
HGB BLD-MCNC: 12.9 G/DL (ref 11.5–16)
HGB UR QL STRIP: NEGATIVE
HYALINE CASTS URNS QL MICRO: ABNORMAL /LPF (ref 0–5)
IMM GRANULOCYTES # BLD: 0.1 K/UL (ref 0–0.04)
IMM GRANULOCYTES NFR BLD AUTO: 1 % (ref 0–0.5)
KETONES UR QL STRIP.AUTO: ABNORMAL MG/DL
LEUKOCYTE ESTERASE UR QL STRIP.AUTO: NEGATIVE
LYMPHOCYTES # BLD: 0.5 K/UL (ref 0.8–3.5)
LYMPHOCYTES NFR BLD: 4 % (ref 12–49)
MCH RBC QN AUTO: 31 PG (ref 26–34)
MCHC RBC AUTO-ENTMCNC: 33 G/DL (ref 30–36.5)
MCV RBC AUTO: 94 FL (ref 80–99)
MONOCYTES # BLD: 0.4 K/UL (ref 0–1)
MONOCYTES NFR BLD: 3 % (ref 5–13)
MUCOUS THREADS URNS QL MICRO: ABNORMAL /LPF
NEUTS SEG # BLD: 12.6 K/UL (ref 1.8–8)
NEUTS SEG NFR BLD: 92 % (ref 32–75)
NITRITE UR QL STRIP.AUTO: NEGATIVE
NRBC # BLD: 0 K/UL (ref 0–0.01)
NRBC BLD-RTO: 0 PER 100 WBC
PCO2 BLDA: 52 MMHG (ref 35–45)
PH BLDA: 7.29 [PH] (ref 7.35–7.45)
PH UR STRIP: 6 [PH] (ref 5–8)
PLATELET # BLD AUTO: 182 K/UL (ref 150–400)
PMV BLD AUTO: 10.7 FL (ref 8.9–12.9)
PO2 BLDA: 195 MMHG (ref 80–100)
POTASSIUM SERPL-SCNC: 4 MMOL/L (ref 3.5–5.1)
PROT UR STRIP-MCNC: 30 MG/DL
RBC # BLD AUTO: 4.16 M/UL (ref 3.8–5.2)
RBC #/AREA URNS HPF: ABNORMAL /HPF (ref 0–5)
RBC MORPH BLD: ABNORMAL
SAO2 % BLD: 99 % (ref 92–97)
SAO2% DEVICE SAO2% SENSOR NAME: ABNORMAL
SERVICE CMNT-IMP: ABNORMAL
SODIUM SERPL-SCNC: 138 MMOL/L (ref 136–145)
SP GR UR REFRACTOMETRY: 1.03 (ref 1–1.03)
SPECIMEN SITE: ABNORMAL
UA: UC IF INDICATED,UAUC: ABNORMAL
UROBILINOGEN UR QL STRIP.AUTO: 0.2 EU/DL (ref 0.2–1)
WBC # BLD AUTO: 13.6 K/UL (ref 3.6–11)
WBC URNS QL MICRO: ABNORMAL /HPF (ref 0–4)

## 2018-05-11 PROCEDURE — 82803 BLOOD GASES ANY COMBINATION: CPT | Performed by: INTERNAL MEDICINE

## 2018-05-11 PROCEDURE — 74011250636 HC RX REV CODE- 250/636: Performed by: INTERNAL MEDICINE

## 2018-05-11 PROCEDURE — 74011000250 HC RX REV CODE- 250: Performed by: INTERNAL MEDICINE

## 2018-05-11 PROCEDURE — 74011250637 HC RX REV CODE- 250/637: Performed by: INTERNAL MEDICINE

## 2018-05-11 PROCEDURE — 82962 GLUCOSE BLOOD TEST: CPT

## 2018-05-11 PROCEDURE — 74011636637 HC RX REV CODE- 636/637: Performed by: INTERNAL MEDICINE

## 2018-05-11 PROCEDURE — 81001 URINALYSIS AUTO W/SCOPE: CPT | Performed by: INTERNAL MEDICINE

## 2018-05-11 PROCEDURE — 65660000000 HC RM CCU STEPDOWN

## 2018-05-11 PROCEDURE — 71045 X-RAY EXAM CHEST 1 VIEW: CPT

## 2018-05-11 PROCEDURE — 85025 COMPLETE CBC W/AUTO DIFF WBC: CPT | Performed by: INTERNAL MEDICINE

## 2018-05-11 PROCEDURE — 94660 CPAP INITIATION&MGMT: CPT

## 2018-05-11 PROCEDURE — 36415 COLL VENOUS BLD VENIPUNCTURE: CPT | Performed by: INTERNAL MEDICINE

## 2018-05-11 PROCEDURE — 94640 AIRWAY INHALATION TREATMENT: CPT

## 2018-05-11 PROCEDURE — 80048 BASIC METABOLIC PNL TOTAL CA: CPT | Performed by: INTERNAL MEDICINE

## 2018-05-11 PROCEDURE — 87086 URINE CULTURE/COLONY COUNT: CPT | Performed by: INTERNAL MEDICINE

## 2018-05-11 PROCEDURE — 5A09357 ASSISTANCE WITH RESPIRATORY VENTILATION, LESS THAN 24 CONSECUTIVE HOURS, CONTINUOUS POSITIVE AIRWAY PRESSURE: ICD-10-PCS | Performed by: INTERNAL MEDICINE

## 2018-05-11 PROCEDURE — 77010033678 HC OXYGEN DAILY

## 2018-05-11 PROCEDURE — 36600 WITHDRAWAL OF ARTERIAL BLOOD: CPT | Performed by: INTERNAL MEDICINE

## 2018-05-11 RX ORDER — SERTRALINE HYDROCHLORIDE 50 MG/1
200 TABLET, FILM COATED ORAL DAILY
Status: DISCONTINUED | OUTPATIENT
Start: 2018-05-12 | End: 2018-05-12

## 2018-05-11 RX ORDER — IPRATROPIUM BROMIDE 0.5 MG/2.5ML
0.5 SOLUTION RESPIRATORY (INHALATION)
Status: DISCONTINUED | OUTPATIENT
Start: 2018-05-11 | End: 2018-05-13

## 2018-05-11 RX ORDER — METOPROLOL TARTRATE 5 MG/5ML
5 INJECTION INTRAVENOUS EVERY 6 HOURS
Status: DISCONTINUED | OUTPATIENT
Start: 2018-05-11 | End: 2018-05-15 | Stop reason: HOSPADM

## 2018-05-11 RX ORDER — IPRATROPIUM BROMIDE 0.5 MG/2.5ML
0.5 SOLUTION RESPIRATORY (INHALATION)
Status: DISCONTINUED | OUTPATIENT
Start: 2018-05-11 | End: 2018-05-11 | Stop reason: SDUPTHER

## 2018-05-11 RX ORDER — OXYBUTYNIN CHLORIDE 5 MG/1
10 TABLET, EXTENDED RELEASE ORAL DAILY
Status: DISCONTINUED | OUTPATIENT
Start: 2018-05-11 | End: 2018-05-15 | Stop reason: HOSPADM

## 2018-05-11 RX ORDER — MUPIROCIN 20 MG/G
OINTMENT TOPICAL EVERY 12 HOURS
Status: DISCONTINUED | OUTPATIENT
Start: 2018-05-11 | End: 2018-05-15 | Stop reason: HOSPADM

## 2018-05-11 RX ORDER — IPRATROPIUM BROMIDE 0.5 MG/2.5ML
SOLUTION RESPIRATORY (INHALATION)
Status: DISCONTINUED
Start: 2018-05-11 | End: 2018-05-11

## 2018-05-11 RX ADMIN — INSULIN LISPRO 2 UNITS: 100 INJECTION, SOLUTION INTRAVENOUS; SUBCUTANEOUS at 17:11

## 2018-05-11 RX ADMIN — Medication 10 ML: at 08:22

## 2018-05-11 RX ADMIN — PIOGLITAZONE 15 MG: 15 TABLET ORAL at 08:11

## 2018-05-11 RX ADMIN — INSULIN LISPRO 5 UNITS: 100 INJECTION, SOLUTION INTRAVENOUS; SUBCUTANEOUS at 13:07

## 2018-05-11 RX ADMIN — ATORVASTATIN CALCIUM 20 MG: 20 TABLET, FILM COATED ORAL at 08:10

## 2018-05-11 RX ADMIN — LEVALBUTEROL HYDROCHLORIDE 1.25 MG: 1.25 SOLUTION RESPIRATORY (INHALATION) at 16:49

## 2018-05-11 RX ADMIN — GUAIFENESIN 600 MG: 600 TABLET, EXTENDED RELEASE ORAL at 08:11

## 2018-05-11 RX ADMIN — ALPRAZOLAM 0.5 MG: 0.5 TABLET ORAL at 22:04

## 2018-05-11 RX ADMIN — FLUTICASONE FUROATE AND VILANTEROL TRIFENATATE 1 PUFF: 200; 25 POWDER RESPIRATORY (INHALATION) at 08:20

## 2018-05-11 RX ADMIN — METOPROLOL TARTRATE 5 MG: 5 INJECTION, SOLUTION INTRAVENOUS at 19:54

## 2018-05-11 RX ADMIN — LEVALBUTEROL HYDROCHLORIDE 1.25 MG: 1.25 SOLUTION RESPIRATORY (INHALATION) at 06:55

## 2018-05-11 RX ADMIN — METHYLPREDNISOLONE SODIUM SUCCINATE 40 MG: 40 INJECTION, POWDER, FOR SOLUTION INTRAMUSCULAR; INTRAVENOUS at 17:11

## 2018-05-11 RX ADMIN — MUPIROCIN: 20 OINTMENT TOPICAL at 21:36

## 2018-05-11 RX ADMIN — METHYLPREDNISOLONE SODIUM SUCCINATE 40 MG: 40 INJECTION, POWDER, FOR SOLUTION INTRAMUSCULAR; INTRAVENOUS at 06:43

## 2018-05-11 RX ADMIN — Medication 10 ML: at 22:04

## 2018-05-11 RX ADMIN — BUPROPION HYDROCHLORIDE 300 MG: 150 TABLET, FILM COATED, EXTENDED RELEASE ORAL at 08:11

## 2018-05-11 RX ADMIN — ASPIRIN 81 MG: 81 TABLET, COATED ORAL at 08:11

## 2018-05-11 RX ADMIN — Medication 10 ML: at 13:17

## 2018-05-11 RX ADMIN — ENOXAPARIN SODIUM 40 MG: 40 INJECTION SUBCUTANEOUS at 08:20

## 2018-05-11 RX ADMIN — GUAIFENESIN 600 MG: 600 TABLET, EXTENDED RELEASE ORAL at 21:36

## 2018-05-11 RX ADMIN — METOPROLOL TARTRATE 5 MG: 5 INJECTION, SOLUTION INTRAVENOUS at 13:17

## 2018-05-11 RX ADMIN — IPRATROPIUM BROMIDE 0.5 MG: 0.5 SOLUTION RESPIRATORY (INHALATION) at 06:56

## 2018-05-11 RX ADMIN — INSULIN LISPRO 7 UNITS: 100 INJECTION, SOLUTION INTRAVENOUS; SUBCUTANEOUS at 08:11

## 2018-05-11 RX ADMIN — OXYBUTYNIN CHLORIDE 10 MG: 5 TABLET, FILM COATED, EXTENDED RELEASE ORAL at 10:47

## 2018-05-11 RX ADMIN — METOPROLOL TARTRATE 5 MG: 5 INJECTION, SOLUTION INTRAVENOUS at 08:20

## 2018-05-11 RX ADMIN — IPRATROPIUM BROMIDE 0.5 MG: 0.5 SOLUTION RESPIRATORY (INHALATION) at 19:52

## 2018-05-11 RX ADMIN — LEVALBUTEROL HYDROCHLORIDE 1.25 MG: 1.25 SOLUTION RESPIRATORY (INHALATION) at 12:04

## 2018-05-11 RX ADMIN — HYDROCODONE POLISTIREX AND CHLORPHENIRAMINE POLISTIREX 5 ML: 10; 8 SUSPENSION, EXTENDED RELEASE ORAL at 02:42

## 2018-05-11 RX ADMIN — MUPIROCIN: 20 OINTMENT TOPICAL at 10:46

## 2018-05-11 RX ADMIN — INSULIN HUMAN 10 UNITS: 100 INJECTION, SUSPENSION SUBCUTANEOUS at 17:11

## 2018-05-11 RX ADMIN — IPRATROPIUM BROMIDE 0.5 MG: 0.5 SOLUTION RESPIRATORY (INHALATION) at 16:48

## 2018-05-11 RX ADMIN — LEVOFLOXACIN 250 MG: 250 TABLET, FILM COATED ORAL at 10:46

## 2018-05-11 RX ADMIN — LISINOPRIL 5 MG: 5 TABLET ORAL at 08:11

## 2018-05-11 RX ADMIN — LEVALBUTEROL HYDROCHLORIDE 1.25 MG: 1.25 SOLUTION RESPIRATORY (INHALATION) at 19:52

## 2018-05-11 RX ADMIN — SERTRALINE HYDROCHLORIDE 150 MG: 50 TABLET ORAL at 08:11

## 2018-05-11 RX ADMIN — METHYLPREDNISOLONE SODIUM SUCCINATE 40 MG: 40 INJECTION, POWDER, FOR SOLUTION INTRAMUSCULAR; INTRAVENOUS at 08:20

## 2018-05-11 RX ADMIN — Medication 10 ML: at 01:03

## 2018-05-11 RX ADMIN — IPRATROPIUM BROMIDE 0.5 MG: 0.5 SOLUTION RESPIRATORY (INHALATION) at 12:03

## 2018-05-11 RX ADMIN — GLIMEPIRIDE 2 MG: 1 TABLET ORAL at 08:11

## 2018-05-11 RX ADMIN — METHYLPREDNISOLONE SODIUM SUCCINATE 40 MG: 40 INJECTION, POWDER, FOR SOLUTION INTRAMUSCULAR; INTRAVENOUS at 22:02

## 2018-05-11 RX ADMIN — LEVALBUTEROL HYDROCHLORIDE 1.25 MG: 1.25 SOLUTION RESPIRATORY (INHALATION) at 03:00

## 2018-05-11 RX ADMIN — INSULIN HUMAN 10 UNITS: 100 INJECTION, SUSPENSION SUBCUTANEOUS at 10:21

## 2018-05-11 NOTE — PROGRESS NOTES
Patient received from 21 [phone]. Minimal distress. Patient with sats % on 50% venti mask. Dr. Cora Laird at the bedside to assess patient. RT called for Bi-pap placement  Dual nurse skin assessment completed with Megha Mattson. No impairment noted  2927-Patient tolerating Bi-pap without distress.

## 2018-05-11 NOTE — PROGRESS NOTES
ADULT PROTOCOL: JET AEROSOL  REASSESSMENT    Patient  Daxa Meño     68 y.o.   female     5/11/2018  5:00 AM    Breath Sounds Pre Procedure: Right Breath Sounds: Expiratory wheezing                               Left Breath Sounds: Expiratory wheezing    Breath Sounds Post Procedure: Right Breath Sounds: Expiratory wheezing                                 Left Breath Sounds: Expiratory wheezing    Breathing pattern: Pre procedure Breathing Pattern: Regular          Post procedure Breathing Pattern: Regular    Heart Rate: Pre procedure Pulse: 115           Post procedure Pulse: 118    Resp Rate: Pre procedure Respirations: 20           Post procedure Respirations: 20    Peak Flow: Pre bronchodilator   n/a          Post bronchodilator   n/a    Incentive Spirometry:   n/a        n/a  Cough: Pre procedure Cough: Non-productive, Congested               Post procedure Cough: Non-productive, Congested    Sputum: Pre procedure  n/a                 Post procedure  n/a    Oxygen: O2 Device: Nasal cannula, Humidifier   Flow rate (L/min) 4     Changed: YES    SpO2: Pre procedure SpO2: 93 %   with oxygen              Post procedure SpO2: 92 %  with oxygen    Nebulizer Therapy: Current medications Aerosolized Medications: Xopenex      Changed: NO    Problem List:   Patient Active Problem List   Diagnosis Code    Type II or unspecified type diabetes mellitus without mention of complication, uncontrolled E11.65    Other and unspecified hyperlipidemia E78.5    Unspecified essential hypertension I10    Acute respiratory failure ([facility] [address] J96.00    Unspecified asthma, with exacerbation J45. 0    NSTEMI (non-ST elevated myocardial infarction) ([facility] [address] I21.4    CAD (coronary artery disease), native coronary artery I25.10    Hyperlipidemia LDL goal < 70 E78.5    Respiratory failure ([facility] [address] J96.90       Respiratory Therapist: RT Sadie

## 2018-05-11 NOTE — DIABETES MGMT
DTC Progress Note    Recommendations/ Comments: Pt discussed with rounding team and Dr. Cholo Thayer. Plan to add NPH 10units Q8hrs timed and linked with solu-medrol including a now dose. Chart reviewed on Rebeka Cones during Multidisciplinary Rounds. A1c:   Lab Results   Component Value Date/Time    Hemoglobin A1c 7.1 (H) 05/10/2018 07:18 AM           Recent Glucose Results:   Lab Results   Component Value Date/Time     (H) 05/11/2018 04:06 AM    GLUCPOC 314 (H) 05/11/2018 08:00 AM    GLUCPOC 282 (H) 05/10/2018 09:16 PM    GLUCPOC 339 (H) 05/10/2018 04:31 PM        Lab Results   Component Value Date/Time    Creatinine 0.91 05/11/2018 04:06 AM     Estimated Creatinine Clearance: 58.4 mL/min (based on Cr of 0.91). Active Orders   Diet    DIET DIABETIC CLEAR LIQUID        PO intake: No data found. Will continue to follow as needed. Thank you.   BRIANA MetzgerN, RN, [address]

## 2018-05-11 NOTE — PROGRESS NOTES
Responded to rapid response call on this patient secondary to increased work of breathing. Patient seen and examined. Patient admitted to COPD exacerbation with acute hypoxic respiratory failure  Patient is currently on non re breather  and saturating at about 92%  She reports feeling better after getting a breathing treatment.   We will get a stat chest x-ray  Get stat blood gas  Consider starting the patient on BiPAP  Transfer to PCU based on ABG  Patient is now able to speak in full sentences and tells me that she is already feeling better  Add Atrovent to her Xopenex jet nebs  Continue Levaquin and Solu-Medrol  Follow-up on results of chest x-ray and blood gas    ABG shows pco2 of 52  No indication for bipap  Cont present mgmt  Follow up on cxr results

## 2018-05-11 NOTE — PROGRESS NOTES
Hospitalist Progress Note    NAME: Daxa Wilder   :  1945   MRN:  [id number]       Assessment / Plan:  Acute hypoxic/hypercarbic respiratory failure due to severe persistent asthma with acute exacerbation:  Not on O2 prior to admission  - CXR neg for acute cardiopulmonary disease  - rapid response called for respiratory distress/increased WOB this morning. ABG 7.29/52/195. Transferring to higher level of care for BiPAP initiation - initial orders written  - pulmonology consulted  - con't solumedrol  - atroven/xopenex nebs  - con't INH steroid (on advair at home)  Non insulin dependent DM2 uncontrolled with steroid-induced hyperglycemia:  - will give NPH with solumedrol  - con't amaryl and actos  - lispro sliding scale  Benign HTN and CAD:  cardiac cath in , nonobstructive  - con't lisinopril, ASA, lipitor  - hydralazine prn  Anxiety with depression: con't home prn bedtime xanax, sertraline, wellbutrin       Code Status: full  Surrogate Decision Maker:  or daughter Feliberto Coyle [phone]  DVT Prophylaxis: lovenox     Subjective:     Chief Complaint / Reason for Physician Visit  \"I'm a little better than I was overngiht \". Discussed with RN events overnight. Review of Systems:  Symptom Y/N Comments  Symptom Y/N Comments   Fever/Chills n   Chest Pain n    Poor Appetite n   Edema n    Cough n   Abdominal Pain n    Sputum n   Joint Pain     SOB/CURTIS y   Pruritis/Rash     Nausea/vomit    Tolerating PT/OT     Diarrhea    Tolerating Diet     Constipation    Other       Could NOT obtain due to:      Objective:     VITALS:   Last 24hrs VS reviewed since prior progress note.  Most recent are:  Patient Vitals for the past 24 hrs:   Temp Pulse Resp BP SpO2   18 0745 97.9 °F (36.6 °C) 100 22 152/68 96 %   18 0648 - (!) 106 20 189/83 94 %   18 0635 - - - - 99 %   18 0322 97.5 °F (36.4 °C) (!) 114 22 160/63 93 %   18 0300 - - - - 93 %   05/10/18 2354 97.8 °F (36.6 °C) (!) 116 24 (!) 166/95 96 %   05/10/18 1954 - - - - 92 %   05/10/18 1912 98.4 °F (36.9 °C) (!) 113 22 145/69 95 %   05/10/18 1530 - - - - 93 %   05/10/18 1516 98.1 °F (36.7 °C) (!) 102 20 142/59 93 %   05/10/18 1321 - (!) 124 28 141/63 95 %   05/10/18 1244 - (!) 106 21 138/66 95 %   05/10/18 1241 - (!) 111 - 138/66 -   05/10/18 1114 - (!) 110 - - -   05/10/18 3180 - (!) 109 - 153/77 -     No intake or output data in the 24 hours ending 05/11/18 0824     PHYSICAL EXAM:  General: WD, WN. Alert, cooperative, no acute distress    EENT:  EOMI. Anicteric sclerae. MMM  Resp:  Diffuse inspiratory and expiratory wheezes. +Accessory muscle use  CV:  Regular rhythm,  No edema  GI:  Soft, Non distended, Non tender.  +Bowel sounds  Neurologic:  Alert and oriented X 3, normal speech,   Psych:   Good insight. Not anxious nor agitated  Skin:  No rashes. No jaundice    Reviewed most current lab test results and cultures  YES  Reviewed most current radiology test results   YES  Review and summation of old records today    NO  Reviewed patient's current orders and MAR    YES  PMH/ reviewed - no change compared to H&P  ________________________________________________________________________  Care Plan discussed with:    Comments   Patient x    Family      RN x    Care Manager     Consultant                        Multidiciplinary team rounds were held today with , nursing, pharmacist and clinical coordinator. Patient's plan of care was discussed; medications were reviewed and discharge planning was addressed.      ________________________________________________________________________  Total NON critical care TIME:  Minutes    Total CRITICAL CARE TIME Spent:  35 minutes non procedure based ([id number])      Comments   >50% of visit spent in counseling and coordination of care x    ________________________________________________________________________  Danni Velazquez, MD     Procedures: see electronic medical records for all procedures/Xrays and details which were not copied into this note but were reviewed prior to creation of Plan. LABS:  I reviewed today's most current labs and imaging studies.   Pertinent labs include:  Recent Labs      05/11/18   0406  05/10/18   0718   WBC  13.6*  7.8   HGB  12.9  12.8   HCT  39.1  37.6   PLT  182  155     Recent Labs      05/11/18   0406  05/10/18   0718   NA  138  137   K  4.0  4.6   CL  108  106   CO2  23  24   GLU  318*  271*   BUN  23*  28*   CREA  0.91  0.96   CA  9.6  9.1   MG   --   1.9   ALB   --   3.6   TBILI   --   0.6   SGOT   --   29   ALT   --   26       Signed: Jackeline Claire MD

## 2018-05-11 NOTE — PROGRESS NOTES
0530. Pt with increased work of breathing. Paged hospitalist for atrovent recommended by RT. Non-rebreather applied @ 0615. Rapid Response called. CXR, ABGs ordered.

## 2018-05-11 NOTE — ROUTINE PROCESS
1500: Report received form Mariam. assessment completed. 1700: Assisted pt to [facility] with BIPAP. Tolerated fair. CURTIS  1900: Bedside shift report given.

## 2018-05-11 NOTE — PROGRESS NOTES
4641: Responded to RRT, patient with respiratory distress, sitting on the side of bed with NRB mask. O2 sats 94%, audible wheezing. Breathing treatment given, solumedrol given. Dr. Shannon Cantrell at bedside,  Orders received for STAT ABG and Chest xray.   0701: Patient on 50% ventimask, O2 sats 96%. States she is breathing better. Dr. Shannon Cantrell notified of ABG results, patient to remain in room at this time.

## 2018-05-11 NOTE — PROGRESS NOTES
Attempted to see patient for OT evaluation now that patient was transferred to CCU after rapid response call for respiratory distress this morning. Nursing reports patient is improving, but remains on BiPAP and recommends that therapy evaluation continue to be put on hold for today. OT will follow back Saturday or Monday for evaluation.

## 2018-05-11 NOTE — PROGRESS NOTES
[facility]  Pulmonary, Critical Care, and Sleep Medicine    Name: Summer Cordoba MRN: [id number]   : 1945 Hospital: [address] [facility]   Date: 2018        Critical Care Initial Patient Consult    IMPRESSION:   · Acute Hypercarbic Respiratory Failure, needed PAP support with bipap for increased work of breathing. · Pt has severe persistent Asthma with acute exacerbation. , baseline Fev1 is 1L, 45% pred. · Has a chronic cough. · Allergic Rhinitis, had negative skin and RAST testing in past.   · Hypertension  · Hyperglycemia, made worse by steroids. · Non smoker  · Acutely ill, moderate risk of decompensation. · Reviewed office notes from DR. Fritzi Aase, Reviewed notes from IM, ER on this admission. · Reviewed all labs, imaging from this admission. · Reviewed her outpt PFTs done last month. RECOMMENDATIONS:   · On Breo, nebs  · ON mucolytics  · On steroids, solumedrol 40mg iv every 8 hrs. · Glycemic monitoring and control  · Will place on bipap as tolerated, Wean oxygen as tolerated  · Added Insulin 10U this am.  · On Xopenex and Atrovent  · Has tussionex prn. · On Enoxaparin for DVT prophylaxis. Subjective/History: This patient has been seen and evaluated at the request of Dr. Amarilis Victor for above. Patient is a 68 y.o. female Pt has been seen by Dr. Fritzi Aase about 3-4 weeks ago in office. She noted to be getting worse and had increased dyspnea for the last 2 weeks. She got acutely worse and presented to the ER yesterday. She had taken 4 neb  Treatment prior to arrival without any improvement. Had increased cough mainly was clear and then developed yellow tinge and was thicker. She usually has some dyspnea on exertion but on day of admission noted that the dyspnea was occurring at rest as well. Had associated symptom of chest tightness as well. She has been compliant with her advair and albuterol prn. NO recent steroid or Abx treatments.  No acute fevers, chills, sweats, leg swelling or palpitations. Who presented yesterday with increased breathing difficulty. She has progressed to have increased difficulty breathing. Increased wheezing. She get about 1 hr of relief with the neb treatments. Pt did not some improvement when placed on bipap this am.    She has been tolerating bipap well. Last seen in office by Dr. Fritzi Aase on 4-16-18. Was followed up for asthma. She has 2 exacerbations last year. Has exertional shortness of breath, nonproductive cough. She has worse symptoms by environmental changes. Cold air exposure. Exposure to smoke. Denies fevers, chills, sweats. No GERD. Denies any aspiration. She has been using her albuterol more frequently without any improvement. She had prior skin testing years ago and everything was negative. Pt has still been walking and working on regular basis. She had gained near 30 lbs over the last year. She works in [employer]. She has 2 dogs in her home. NO birds.   No etoh use. Has been on Albuterol nebs at home  Ventolin prn  Advair 500/50, on inh bid. Alprazolam prn      PFTs on 4-16-18 in office:  FVC: 2.3L 78% pred  FEV1: 1L 45% pred  FEV1/FVC: 44%    42%  Bronchodilator response     RV/TLC is 132% pred  DLCO /VA is 114%. Past Medical History:   Diagnosis Date    Asthma     Cervical cancer ([facility] [address] 1981    COPD     Depression with anxiety     Diabetes ([facility] [address]     currently on no meds    Other and unspecified hyperlipidemia     Type II or unspecified type diabetes mellitus without mention of complication, uncontrolled     A1c 9.2 8/2012;  used to see Dr. Claire Marrero essential hypertension       Past Surgical History:   Procedure Laterality Date    CARDIAC CATHETERIZATION  2/21/2013         HX APPENDECTOMY      HX CERVICAL FUSION      HX AURY AND BSO      for cervical cancer      Prior to Admission medications    Medication Sig Start Date End Date Taking? Authorizing Provider   lisinopril (PRINIVIL, ZESTRIL) 10 mg tablet Take 5 mg by mouth daily. Yes Mendoza Martinez MD   buPROPion XL (WELLBUTRIN XL) 300 mg XL tablet Take 300 mg by mouth every morning. Yes Mendoza Martinez MD   glimepiride (AMARYL) 2 mg tablet Take 2 mg by mouth every morning. Yes Mendoza Martinez MD   atorvastatin (LIPITOR) 20 mg tablet Take 20 mg by mouth daily. Yes Mendoza Martinez MD   aspirin delayed-release 81 mg tablet Take 81 mg by mouth daily. Yes Historical Provider   pioglitazone (ACTOS) 15 mg tablet Take 15 mg by mouth daily. Yes Historical Provider   dextromethorphan-guaiFENesin (ROBITUSSIN-DM)  mg/5 mL syrup Take 10 mL by mouth every four (4) hours as needed for Cough. Yes Historical Provider   cholecalciferol (VITAMIN D3) 1,000 unit tablet Take 1,000 Units by mouth daily. Yes Historical Provider   cyanocobalamin 1,000 mcg tablet Take 1,000 mcg by mouth daily. Yes Historical Provider   albuterol-ipratropium (DUONEB) 2.5 mg-0.5 mg/3 ml nebulizer solution 3 mL by Nebulization route every four (4) hours as needed for Wheezing. 2/25/13  Yes William Brown MD   ALPRAZolam Krystle Ricardo) 0.5 mg tablet Take 0.5 mg by mouth two (2) times daily as needed. Yes Historical Provider   albuterol (PROVENTIL, VENTOLIN) 90 mcg/actuation inhaler Take 1-2 Puffs by inhalation every four (4) hours as needed for Wheezing. 2/18/13  Yes Nery Block MD   fluticasone-salmeterol (ADVAIR DISKUS) 500-50 mcg/dose diskus inhaler Take 1 Puff by inhalation every twelve (12) hours. Yes Historical Provider   sertraline (ZOLOFT) 100 mg tablet Take 150 mg by mouth daily.    Yes Historical Provider     Current Facility-Administered Medications   Medication Dose Route Frequency    ipratropium (ATROVENT) 0.02 % nebulizer solution        ipratropium (ATROVENT) 0.02 % nebulizer solution 0.5 mg  0.5 mg Nebulization Q4H RT    metoprolol (LOPRESSOR) injection 5 mg  5 mg IntraVENous Q6H    oxybutynin chloride XL (DITROPAN XL) tablet 10 mg  10 mg Oral DAILY    insulin lispro (HUMALOG) injection   SubCUTAneous AC&HS    guaiFENesin ER (MUCINEX) tablet 600 mg  600 mg Oral Q12H    sodium chloride (NS) flush 5-10 mL  5-10 mL IntraVENous Q8H    enoxaparin (LOVENOX) injection 40 mg  40 mg SubCUTAneous Q24H    levalbuterol (XOPENEX) nebulizer soln 1.25 mg/3 mL  1.25 mg Nebulization Q4H RT    glimepiride (AMARYL) tablet 2 mg  2 mg Oral ACB    lisinopril (PRINIVIL, ZESTRIL) tablet 5 mg  5 mg Oral DAILY    atorvastatin (LIPITOR) tablet 20 mg  20 mg Oral DAILY    buPROPion XL (WELLBUTRIN XL) tablet 300 mg  300 mg Oral 7am    fluticasone-vilanterol (BREO ELLIPTA) 200mcg-25mcg/puff  1 Puff Inhalation DAILY    levoFLOXacin (LEVAQUIN) tablet 250 mg  250 mg Oral Q24H    methylPREDNISolone (PF) (SOLU-MEDROL) injection 40 mg  40 mg IntraVENous Q8H    aspirin delayed-release tablet 81 mg  81 mg Oral DAILY    sertraline (ZOLOFT) tablet 150 mg  150 mg Oral DAILY    pioglitazone (ACTOS) tablet 15 mg  15 mg Oral 7am     Allergies   Allergen Reactions    Metformin Nausea and Vomiting      Social History   Substance Use Topics    Smoking status: Never Smoker    Smokeless tobacco: Never Used      Comment: exposed to second hand smoking in past    Alcohol use No      Comment: rarely a mixed drink      Family History   Problem Relation Age of Onset    Heart Disease Neg Hx     Stroke Neg Hx         Review of Systems:  Constitutional: positive for fatigue and malaise  Eyes: negative  Ears, nose, mouth, throat, and face: positive for nasal congestion  Respiratory: positive for cough, asthma, wheezing, dyspnea on exertion or chronic bronchitis  Cardiovascular: positive for dyspnea, fatigue, paroxysmal nocturnal dyspnea, tachypnea, dyspnea on exertion  Gastrointestinal: negative  Genitourinary:negative  Integument/breast: negative  Hematologic/lymphatic: negative  Musculoskeletal:negative  Neurological: negative  Behavioral/Psych: negative  Endocrine: negative  Allergic/Immunologic: negative    Objective:   Vital Signs:    Visit Vitals    /68 (BP 1 Location: Left arm, BP Patient Position: At rest)    Pulse 100    Temp 97.9 °F (36.6 °C)    Resp 22    Ht 5' 5\" (1.651 m)    Wt 79.9 kg (176 lb 2.4 oz)    SpO2 96%    BMI 29.31 kg/m2       O2 Device: Non-rebreather mask   O2 Flow Rate (L/min): 4 l/min   Temp (24hrs), Av.9 °F (36.6 °C), Min:97.5 °F (36.4 °C), Max:98.4 °F (36.9 °C)       Intake/Output:   Last shift:         Last 3 shifts:    No intake or output data in the 24 hours ending 18 0901  Hemodynamics:   PAP:   CO:     Wedge:   CI:     CVP:    SVR:       PVR:       Ventilator Settings:  Mode Rate Tidal Volume Pressure FiO2 PEEP            100 %       Peak airway pressure:      Minute ventilation:        Physical Exam:    General:  Alert, cooperative, no distress, appears stated age. Head:  Normocephalic, without obvious abnormality, atraumatic. Eyes:  Conjunctivae/corneas clear. PERRL, EOMs intact. Nose: Nares normal. Septum midline. Mucosa normal. No drainage or sinus tenderness. Throat: Lips, mucosa, and tongue normal. Teeth and gums normal.   Neck: Supple, symmetrical, trachea midline, no adenopathy, thyroid: no enlargment/tenderness/nodules, no carotid bruit and no JVD. Back:   Symmetric, no curvature. ROM normal.   Lungs:   Has moderate wheezing, increased work of breathing. Chest wall:  No tenderness or deformity. Heart:  Regular rate and rhythm, S1, S2 normal, no murmur, click, rub or gallop. Abdomen:   Soft, non-tender. Bowel sounds normal. No masses,  No organomegaly. Extremities: Extremities normal, atraumatic, no cyanosis or edema. Pulses: 2+ and symmetric all extremities.    Skin: Skin color, texture, turgor normal. No rashes or lesions   Lymph nodes: Cervical, supraclavicular, and axillary nodes normal.   Neurologic: Grossly nonfocal, seems to have symmetric strength of BUE and BLE.  Psych: no overt anxiety or depression. Data:     Recent Results (from the past 24 hour(s))   GLUCOSE, POC    Collection Time: 05/10/18 11:25 AM   Result Value Ref Range    Glucose (POC) 401 (H) 65 - 100 mg/dL    Performed by Chiquita Bergman    GLUCOSE, POC    Collection Time: 05/10/18  4:31 PM   Result Value Ref Range    Glucose (POC) 339 (H) 65 - 100 mg/dL    Performed by Katherine Barnes    GLUCOSE, POC    Collection Time: 05/10/18  9:16 PM   Result Value Ref Range    Glucose (POC) 282 (H) 65 - 100 mg/dL    Performed by Latonia Zarate (PCT)    METABOLIC PANEL, BASIC    Collection Time: 05/11/18  4:06 AM   Result Value Ref Range    Sodium 138 136 - 145 mmol/L    Potassium 4.0 3.5 - 5.1 mmol/L    Chloride 108 97 - 108 mmol/L    CO2 23 21 - 32 mmol/L    Anion gap 7 5 - 15 mmol/L    Glucose 318 (H) 65 - 100 mg/dL    BUN 23 (H) 6 - 20 MG/DL    Creatinine 0.91 0.55 - 1.02 MG/DL    BUN/Creatinine ratio 25 (H) 12 - 20      GFR est AA >60 >60 ml/min/1.73m2    GFR est non-AA >60 >60 ml/min/1.73m2    Calcium 9.6 8.5 - 10.1 MG/DL   CBC WITH AUTOMATED DIFF    Collection Time: 05/11/18  4:06 AM   Result Value Ref Range    WBC 13.6 (H) 3.6 - 11.0 K/uL    RBC 4.16 3.80 - 5.20 M/uL    HGB 12.9 11.5 - 16.0 g/dL    HCT 39.1 35.0 - 47.0 %    MCV 94.0 80.0 - 99.0 FL    MCH 31.0 26.0 - 34.0 PG    MCHC 33.0 30.0 - 36.5 g/dL    RDW 11.9 11.5 - 14.5 %    PLATELET 711 716 - 749 K/uL    MPV 10.7 8.9 - 12.9 FL    NRBC 0.0 0  WBC    ABSOLUTE NRBC 0.00 0.00 - 0.01 K/uL    NEUTROPHILS 92 (H) 32 - 75 %    LYMPHOCYTES 4 (L) 12 - 49 %    MONOCYTES 3 (L) 5 - 13 %    EOSINOPHILS 0 0 - 7 %    BASOPHILS 0 0 - 1 %    IMMATURE GRANULOCYTES 1 (H) 0.0 - 0.5 %    ABS. NEUTROPHILS 12.6 (H) 1.8 - 8.0 K/UL    ABS. LYMPHOCYTES 0.5 (L) 0.8 - 3.5 K/UL    ABS. MONOCYTES 0.4 0.0 - 1.0 K/UL    ABS. EOSINOPHILS 0.0 0.0 - 0.4 K/UL    ABS. BASOPHILS 0.0 0.0 - 0.1 K/UL    ABS. IMM.  GRANS. 0.1 (H) 0.00 - 0.04 K/UL    DF AUTOMATED      RBC COMMENTS NORMOCYTIC, NORMOCHROMIC     BLOOD GAS, ARTERIAL    Collection Time: 05/11/18  6:42 AM   Result Value Ref Range    pH 7.29 (L) 7.35 - 7.45      PCO2 52 (H) 35.0 - 45.0 mmHg    PO2 195 (H) 80 - 100 mmHg    O2 SAT 99 (H) 92 - 97 %    BICARBONATE 24 22 - 26 mmol/L    BASE DEFICIT 3.1 mmol/L    O2 METHOD NRM      O2 FLOW RATE 15.00 L/min    FIO2 100 %    Sample source ARTERIAL      SITE RIGHT RADIAL      HOA'S TEST YES     GLUCOSE, POC    Collection Time: 05/11/18  8:00 AM   Result Value Ref Range    Glucose (POC) 314 (H) 65 - 100 mg/dL    Performed by Rosenda Villagran (PCT)              Telemetry:NSR with occ PVCs. Imaging:  I have personally reviewed the patients radiographs and have reviewed the reports:  5-11-18: CXR:  Clear lung fields. No overt infiltrates.         Usman Rodriguez MD

## 2018-05-11 NOTE — PROGRESS NOTES
Physical Therapy Note    Orders received. Chart reviewed. Noted pt with RRT this AM due to respiratory distress. Spoke with nursing who requests PT hold evaluation this date as pt is transferring to CCU for higher level of care and is needing BiPAP. Will hold PT evaluation and follow back tomorrow as appropriate.     Thank you,  Donnie Waldrop, PT, DPT

## 2018-05-11 NOTE — PROGRESS NOTES
TRANSFER - OUT REPORT:    Verbal report given to RN (name) on Castillo Leaks  being transferred to CCU (unit) for urgent transfer       Report consisted of patients Situation, Background, Assessment and   Recommendations(SBAR). Information from the following report(s) SBAR, Kardex, Intake/Output, MAR and Recent Results was reviewed with the receiving nurse. Lines:   Peripheral IV 05/10/18 Right Hand (Active)   Site Assessment Clean, dry, & intact 5/11/2018  7:47 AM   Phlebitis Assessment 0 5/11/2018  7:47 AM   Infiltration Assessment 0 5/11/2018  7:47 AM   Dressing Status Clean, dry, & intact 5/11/2018  7:47 AM   Dressing Type Tape;Transparent 5/11/2018  7:47 AM   Hub Color/Line Status Pink 5/11/2018  7:47 AM        Opportunity for questions and clarification was provided.       Patient transported with:   Monitor  O2 @ 12 liters  Patient-specific medications from Pharmacy  Registered Nurse

## 2018-05-11 NOTE — CDMP QUERY
Dr. Manjit UGALDE :  Patient is noted to have a BMI of 29.31. Please clarify if this patient is:     =>Morbidly obese (BMI ³ 40)  =>Obese (BMI 30 - 39.9)  =>Overweight (BMI 25 - 29.9)  =>Other explanation of clinical findings  =>Unable to determine (no explanation for clinical findings)    Presentation: 5'5\", 176 lbs 2.4 oz = BMI 29.31    REFERENCE:  The [address] [place] [address] has issued a statement indicating that, \"Individuals who are overweight, obese, or morbidly obese are at an increased risk for certain medical conditions when compared to persons of normal weight. Therefore, these conditions are always clinically significant and reportable when documented by the provider. Please clarify and document your clinical opinion in the progress notes and discharge summary, including the definitive and or presumptive diagnosis, (suspected or probable), related to the above clinical findings. Please include clinical findings supporting your diagnosis.      Thank Fermin Potter  [phone]

## 2018-05-12 ENCOUNTER — APPOINTMENT (OUTPATIENT)
Dept: GENERAL RADIOLOGY | Age: 73
DRG: 189 | End: 2018-05-12
Attending: INTERNAL MEDICINE
Payer: MEDICARE

## 2018-05-12 LAB
ALBUMIN SERPL-MCNC: 3.8 G/DL (ref 3.5–5)
ALBUMIN/GLOB SERPL: 1 {RATIO} (ref 1.1–2.2)
ALP SERPL-CCNC: 66 U/L (ref 45–117)
ALT SERPL-CCNC: 34 U/L (ref 12–78)
ANION GAP SERPL CALC-SCNC: 7 MMOL/L (ref 5–15)
AST SERPL-CCNC: 41 U/L (ref 15–37)
BASOPHILS # BLD: 0 K/UL (ref 0–0.1)
BASOPHILS NFR BLD: 0 % (ref 0–1)
BILIRUB SERPL-MCNC: 0.8 MG/DL (ref 0.2–1)
BUN SERPL-MCNC: 27 MG/DL (ref 6–20)
BUN/CREAT SERPL: 36 (ref 12–20)
CALCIUM SERPL-MCNC: 9.2 MG/DL (ref 8.5–10.1)
CHLORIDE SERPL-SCNC: 105 MMOL/L (ref 97–108)
CO2 SERPL-SCNC: 26 MMOL/L (ref 21–32)
CREAT SERPL-MCNC: 0.75 MG/DL (ref 0.55–1.02)
DIFFERENTIAL METHOD BLD: ABNORMAL
EOSINOPHIL # BLD: 0 K/UL (ref 0–0.4)
EOSINOPHIL NFR BLD: 0 % (ref 0–7)
ERYTHROCYTE [DISTWIDTH] IN BLOOD BY AUTOMATED COUNT: 12 % (ref 11.5–14.5)
GLOBULIN SER CALC-MCNC: 3.8 G/DL (ref 2–4)
GLUCOSE BLD STRIP.AUTO-MCNC: 123 MG/DL (ref 65–100)
GLUCOSE BLD STRIP.AUTO-MCNC: 186 MG/DL (ref 65–100)
GLUCOSE BLD STRIP.AUTO-MCNC: 220 MG/DL (ref 65–100)
GLUCOSE BLD STRIP.AUTO-MCNC: 226 MG/DL (ref 65–100)
GLUCOSE SERPL-MCNC: 184 MG/DL (ref 65–100)
HCT VFR BLD AUTO: 40.6 % (ref 35–47)
HGB BLD-MCNC: 13.2 G/DL (ref 11.5–16)
IMM GRANULOCYTES # BLD: 0.2 K/UL (ref 0–0.04)
IMM GRANULOCYTES NFR BLD AUTO: 1 % (ref 0–0.5)
LYMPHOCYTES # BLD: 1.1 K/UL (ref 0.8–3.5)
LYMPHOCYTES NFR BLD: 6 % (ref 12–49)
MAGNESIUM SERPL-MCNC: 2.2 MG/DL (ref 1.6–2.4)
MCH RBC QN AUTO: 30.9 PG (ref 26–34)
MCHC RBC AUTO-ENTMCNC: 32.5 G/DL (ref 30–36.5)
MCV RBC AUTO: 95.1 FL (ref 80–99)
MONOCYTES # BLD: 0.6 K/UL (ref 0–1)
MONOCYTES NFR BLD: 4 % (ref 5–13)
NEUTS SEG # BLD: 14.8 K/UL (ref 1.8–8)
NEUTS SEG NFR BLD: 89 % (ref 32–75)
NRBC # BLD: 0 K/UL (ref 0–0.01)
NRBC BLD-RTO: 0 PER 100 WBC
PHOSPHATE SERPL-MCNC: 3.5 MG/DL (ref 2.6–4.7)
PLATELET # BLD AUTO: 178 K/UL (ref 150–400)
PMV BLD AUTO: 10.6 FL (ref 8.9–12.9)
POTASSIUM SERPL-SCNC: 4.1 MMOL/L (ref 3.5–5.1)
PROT SERPL-MCNC: 7.6 G/DL (ref 6.4–8.2)
RBC # BLD AUTO: 4.27 M/UL (ref 3.8–5.2)
SERVICE CMNT-IMP: ABNORMAL
SODIUM SERPL-SCNC: 138 MMOL/L (ref 136–145)
WBC # BLD AUTO: 16.7 K/UL (ref 3.6–11)

## 2018-05-12 PROCEDURE — 74011000250 HC RX REV CODE- 250: Performed by: INTERNAL MEDICINE

## 2018-05-12 PROCEDURE — 97116 GAIT TRAINING THERAPY: CPT

## 2018-05-12 PROCEDURE — 94640 AIRWAY INHALATION TREATMENT: CPT

## 2018-05-12 PROCEDURE — 36415 COLL VENOUS BLD VENIPUNCTURE: CPT | Performed by: INTERNAL MEDICINE

## 2018-05-12 PROCEDURE — 74011636637 HC RX REV CODE- 636/637: Performed by: INTERNAL MEDICINE

## 2018-05-12 PROCEDURE — 85025 COMPLETE CBC W/AUTO DIFF WBC: CPT | Performed by: INTERNAL MEDICINE

## 2018-05-12 PROCEDURE — 71045 X-RAY EXAM CHEST 1 VIEW: CPT

## 2018-05-12 PROCEDURE — G8989 SELF CARE D/C STATUS: HCPCS | Performed by: OCCUPATIONAL THERAPIST

## 2018-05-12 PROCEDURE — 74011250637 HC RX REV CODE- 250/637: Performed by: INTERNAL MEDICINE

## 2018-05-12 PROCEDURE — 94660 CPAP INITIATION&MGMT: CPT

## 2018-05-12 PROCEDURE — 80053 COMPREHEN METABOLIC PANEL: CPT | Performed by: INTERNAL MEDICINE

## 2018-05-12 PROCEDURE — 97161 PT EVAL LOW COMPLEX 20 MIN: CPT

## 2018-05-12 PROCEDURE — 83735 ASSAY OF MAGNESIUM: CPT | Performed by: INTERNAL MEDICINE

## 2018-05-12 PROCEDURE — 74011250636 HC RX REV CODE- 250/636: Performed by: INTERNAL MEDICINE

## 2018-05-12 PROCEDURE — G8987 SELF CARE CURRENT STATUS: HCPCS | Performed by: OCCUPATIONAL THERAPIST

## 2018-05-12 PROCEDURE — 84100 ASSAY OF PHOSPHORUS: CPT | Performed by: INTERNAL MEDICINE

## 2018-05-12 PROCEDURE — 65660000000 HC RM CCU STEPDOWN

## 2018-05-12 PROCEDURE — 77010033678 HC OXYGEN DAILY

## 2018-05-12 PROCEDURE — 97165 OT EVAL LOW COMPLEX 30 MIN: CPT | Performed by: OCCUPATIONAL THERAPIST

## 2018-05-12 PROCEDURE — G8979 MOBILITY GOAL STATUS: HCPCS

## 2018-05-12 PROCEDURE — G8988 SELF CARE GOAL STATUS: HCPCS | Performed by: OCCUPATIONAL THERAPIST

## 2018-05-12 PROCEDURE — G8978 MOBILITY CURRENT STATUS: HCPCS

## 2018-05-12 PROCEDURE — 82962 GLUCOSE BLOOD TEST: CPT

## 2018-05-12 RX ORDER — LEVOFLOXACIN 500 MG/1
500 TABLET, FILM COATED ORAL EVERY 24 HOURS
Status: DISCONTINUED | OUTPATIENT
Start: 2018-05-13 | End: 2018-05-15 | Stop reason: HOSPADM

## 2018-05-12 RX ORDER — LEVOFLOXACIN 250 MG/1
250 TABLET ORAL ONCE
Status: COMPLETED | OUTPATIENT
Start: 2018-05-12 | End: 2018-05-12

## 2018-05-12 RX ADMIN — IPRATROPIUM BROMIDE 0.5 MG: 0.5 SOLUTION RESPIRATORY (INHALATION) at 11:32

## 2018-05-12 RX ADMIN — LEVALBUTEROL HYDROCHLORIDE 1.25 MG: 1.25 SOLUTION RESPIRATORY (INHALATION) at 16:54

## 2018-05-12 RX ADMIN — Medication 10 ML: at 05:31

## 2018-05-12 RX ADMIN — ASPIRIN 81 MG: 81 TABLET, COATED ORAL at 09:48

## 2018-05-12 RX ADMIN — LEVALBUTEROL HYDROCHLORIDE 1.25 MG: 1.25 SOLUTION RESPIRATORY (INHALATION) at 04:01

## 2018-05-12 RX ADMIN — ENOXAPARIN SODIUM 40 MG: 40 INJECTION SUBCUTANEOUS at 09:41

## 2018-05-12 RX ADMIN — LEVALBUTEROL HYDROCHLORIDE 1.25 MG: 1.25 SOLUTION RESPIRATORY (INHALATION) at 00:10

## 2018-05-12 RX ADMIN — METOPROLOL TARTRATE 5 MG: 5 INJECTION, SOLUTION INTRAVENOUS at 08:40

## 2018-05-12 RX ADMIN — MUPIROCIN: 20 OINTMENT TOPICAL at 10:35

## 2018-05-12 RX ADMIN — INSULIN LISPRO 2 UNITS: 100 INJECTION, SOLUTION INTRAVENOUS; SUBCUTANEOUS at 08:36

## 2018-05-12 RX ADMIN — LEVALBUTEROL HYDROCHLORIDE 1.25 MG: 1.25 SOLUTION RESPIRATORY (INHALATION) at 11:32

## 2018-05-12 RX ADMIN — INSULIN LISPRO 3 UNITS: 100 INJECTION, SOLUTION INTRAVENOUS; SUBCUTANEOUS at 12:15

## 2018-05-12 RX ADMIN — IPRATROPIUM BROMIDE 0.5 MG: 0.5 SOLUTION RESPIRATORY (INHALATION) at 19:35

## 2018-05-12 RX ADMIN — IPRATROPIUM BROMIDE 0.5 MG: 0.5 SOLUTION RESPIRATORY (INHALATION) at 07:39

## 2018-05-12 RX ADMIN — GUAIFENESIN 600 MG: 600 TABLET, EXTENDED RELEASE ORAL at 21:48

## 2018-05-12 RX ADMIN — GUAIFENESIN 600 MG: 600 TABLET, EXTENDED RELEASE ORAL at 09:48

## 2018-05-12 RX ADMIN — METHYLPREDNISOLONE SODIUM SUCCINATE 40 MG: 40 INJECTION, POWDER, FOR SOLUTION INTRAMUSCULAR; INTRAVENOUS at 15:24

## 2018-05-12 RX ADMIN — GLIMEPIRIDE 2 MG: 1 TABLET ORAL at 08:37

## 2018-05-12 RX ADMIN — OXYBUTYNIN CHLORIDE 10 MG: 5 TABLET, FILM COATED, EXTENDED RELEASE ORAL at 09:46

## 2018-05-12 RX ADMIN — IPRATROPIUM BROMIDE 0.5 MG: 0.5 SOLUTION RESPIRATORY (INHALATION) at 00:10

## 2018-05-12 RX ADMIN — LISINOPRIL 5 MG: 5 TABLET ORAL at 09:47

## 2018-05-12 RX ADMIN — Medication 10 ML: at 15:28

## 2018-05-12 RX ADMIN — INSULIN LISPRO 2 UNITS: 100 INJECTION, SOLUTION INTRAVENOUS; SUBCUTANEOUS at 21:46

## 2018-05-12 RX ADMIN — Medication 10 ML: at 21:48

## 2018-05-12 RX ADMIN — PIOGLITAZONE 15 MG: 15 TABLET ORAL at 06:26

## 2018-05-12 RX ADMIN — FLUTICASONE FUROATE AND VILANTEROL TRIFENATATE 1 PUFF: 200; 25 POWDER RESPIRATORY (INHALATION) at 09:40

## 2018-05-12 RX ADMIN — SERTRALINE HYDROCHLORIDE 200 MG: 50 TABLET ORAL at 09:42

## 2018-05-12 RX ADMIN — IPRATROPIUM BROMIDE 0.5 MG: 0.5 SOLUTION RESPIRATORY (INHALATION) at 23:33

## 2018-05-12 RX ADMIN — HUMAN INSULIN 10 UNITS: 100 INJECTION, SUSPENSION SUBCUTANEOUS at 21:44

## 2018-05-12 RX ADMIN — BUPROPION HYDROCHLORIDE 300 MG: 150 TABLET, FILM COATED, EXTENDED RELEASE ORAL at 06:26

## 2018-05-12 RX ADMIN — LEVOFLOXACIN 250 MG: 250 TABLET, FILM COATED ORAL at 15:18

## 2018-05-12 RX ADMIN — LEVALBUTEROL HYDROCHLORIDE 1.25 MG: 1.25 SOLUTION RESPIRATORY (INHALATION) at 07:39

## 2018-05-12 RX ADMIN — ATORVASTATIN CALCIUM 20 MG: 20 TABLET, FILM COATED ORAL at 09:48

## 2018-05-12 RX ADMIN — METOPROLOL TARTRATE 5 MG: 5 INJECTION, SOLUTION INTRAVENOUS at 15:18

## 2018-05-12 RX ADMIN — MUPIROCIN: 20 OINTMENT TOPICAL at 21:00

## 2018-05-12 RX ADMIN — HUMAN INSULIN 10 UNITS: 100 INJECTION, SUSPENSION SUBCUTANEOUS at 05:29

## 2018-05-12 RX ADMIN — LEVALBUTEROL HYDROCHLORIDE 1.25 MG: 1.25 SOLUTION RESPIRATORY (INHALATION) at 19:35

## 2018-05-12 RX ADMIN — IPRATROPIUM BROMIDE 0.5 MG: 0.5 SOLUTION RESPIRATORY (INHALATION) at 04:01

## 2018-05-12 RX ADMIN — IPRATROPIUM BROMIDE 0.5 MG: 0.5 SOLUTION RESPIRATORY (INHALATION) at 16:54

## 2018-05-12 RX ADMIN — LEVALBUTEROL HYDROCHLORIDE 1.25 MG: 1.25 SOLUTION RESPIRATORY (INHALATION) at 23:33

## 2018-05-12 RX ADMIN — LEVOFLOXACIN 250 MG: 250 TABLET, FILM COATED ORAL at 12:15

## 2018-05-12 NOTE — PROGRESS NOTES
Problem: Mobility Impaired (Adult and Pediatric)  Goal: *Acute Goals and Plan of Care (Insert Text)  Physical Therapy Goals  Initiated 5/12/2018  1. Patient will move from supine to sit and sit to supine  in bed with independence within 7 day(s). 2.  Patient will transfer from bed to chair and chair to bed with independence using the least restrictive device within 7 day(s). 3.  Patient will perform sit to stand with independence within 7 day(s). 4.  Patient will ambulate with independence for 500 feet with the least restrictive device within 7 day(s). 5.  Patient will ascend/descend 5 stairs with 1 handrail(s) with independence within 7 day(s). physical Therapy EVALUATION  Patient: Jacqui Bumpers (62 y.o. female)  Date: 5/12/2018  Primary Diagnosis: Respiratory failure ([address]        Precautions:   Fall    ASSESSMENT :  Based on the objective data described below, the patient presents with sanjiv for supplemental oxygen, impaired balance and altered gait. Pt lives with  and was independent prior to admission, not needing any oxygen at home. She was received in supine on 2L and cleared by nursing to mobilize. She performed all mobility at a SBA level or higher. Ambulated into the ortiz with cardiac cart for a total of 450ft for the entire CCU, mildly unsteady without UE support. Oxygen saturation remained 90% on the 2L during activity. She was returned to the room and left sitting up in the chair. Pt is planned to transfer out of CCU. Will likely not need any PT services at discharge if she continues to improve, focus on endurance and weaning oxygen as able. Patient will benefit from skilled intervention to address the above impairments.   Patients rehabilitation potential is considered to be Good  Factors which may influence rehabilitation potential include:   [x]         None noted  []         Mental ability/status  []         Medical condition  []         Home/family situation and support systems  []         Safety awareness  []         Pain tolerance/management  []         Other:      PLAN :  Recommendations and Planned Interventions:  [x]           Bed Mobility Training             []    Neuromuscular Re-Education  [x]           Transfer Training                   []    Orthotic/Prosthetic Training  [x]           Gait Training                         []    Modalities  [x]           Therapeutic Exercises           []    Edema Management/Control  [x]           Therapeutic Activities            [x]    Patient and Family Training/Education  []           Other (comment):    Frequency/Duration: Patient will be followed by physical therapy  3 times a week to address goals. Discharge Recommendations: None  Further Equipment Recommendations for Discharge: none vs oxygen pending progress     SUBJECTIVE:   Patient stated Deya Chadwick didn't want me to walk.     OBJECTIVE DATA SUMMARY:   HISTORY:    Past Medical History:   Diagnosis Date    Asthma     Cervical [facility] ([facility] 0    COPD     Depression with anxiety     [facility]     currently on no meds    Other and unspecified hyperlipidemia     Type II or unspecified type diabetes mellitus without mention of complication, uncontrolled     A1c 9.2 8/2012;  used to see Dr. Rory Montalvo essential hypertension      Past Surgical History:   Procedure Laterality Date    CARDIAC CATHETERIZATION  2/21/2013         HX APPENDECTOMY      HX CERVICAL FUSION      HX AURY AND BSO      for cervical cancer     Prior Level of Function/Home Situation: pt lives with  and was independent, not on home oxygen  Personal factors and/or comorbidities impacting plan of care:     Home Situation  Home Environment: Private residence  # Steps to Enter: 3  Rails to Enter: Yes  Hand Rails : Left  One/Two Story Residence: One story  Living Alone: No  Support Systems: Spouse/Significant Other/Partner  Patient Expects to be Discharged to[de-identified] Private residence  Current DME Used/Available at Home: Shower chair, Nebulizer  Tub or Shower Type: Shower    EXAMINATION/PRESENTATION/DECISION MAKING:   Critical Behavior:   alert and oriented x 4           Hearing: Auditory  Auditory Impairment: None  Skin:  intact  Edema:  none  Range Of Motion:  AROM: Within functional limits           PROM: Within functional limits           Strength:    Strength: Generally decreased, functional                    Tone & Sensation:   Tone: Normal              Sensation: Intact               Coordination:  Coordination: Within functional limits  Vision:      Functional Mobility:  Bed Mobility:  Rolling: Supervision  Supine to Sit: Supervision     Scooting: Supervision  Transfers:  Sit to Stand: Stand-by assistance  Stand to Sit: Stand-by assistance        Bed to Chair: Stand-by assistance              Balance:   Sitting: Intact  Standing: Impaired  Standing - Static: Good;Constant support  Standing - Dynamic : Fair  Ambulation/Gait Training:  Distance (ft): 450 Feet (ft)  Assistive Device: Gait belt (cardiac cart)  Ambulation - Level of Assistance: Contact guard assistance        Gait Abnormalities: Decreased step clearance        Base of Support: Widened     Speed/Jessica: Slow  Step Length: Left shortened;Right shortened                     Functional Measure:  Barthel Index:    Bathin  Bladder: 5  Bowels: 10  Groomin  Dressing: 10  Feeding: 10  Mobility: 10  Stairs: 0  Toilet Use: 10  Transfer (Bed to Chair and Back): 10  Total: 70       Barthel and G-code impairment scale:  Percentage of impairment CH  0% CI  1-19% CJ  20-39% CK  40-59% CL  60-79% CM  80-99% CN  100%   Barthel Score 0-100 100 99-80 79-60 59-40 20-39 1-19   0   Barthel Score 0-20 20 17-19 13-16 9-12 5-8 1-4 0      The Barthel ADL Index: Guidelines  1. The index should be used as a record of what a patient does, not as a record of what a patient could do.   2. The main aim is to establish degree of independence from any help, physical or verbal, however minor and for whatever reason. 3. The need for supervision renders the patient not independent. 4. A patient's performance should be established using the best available evidence. Asking the patient, friends/relatives and nurses are the usual sources, but direct observation and common sense are also important. However direct testing is not needed. 5. Usually the patient's performance over the preceding 24-48 hours is important, but occasionally longer periods will be relevant. 6. Middle categories imply that the patient supplies over 50 per cent of the effort. 7. Use of aids to be independent is allowed. Bree Ramírez., Barthel, D.W. (5761). Functional evaluation: the Barthel Index. [address] [facility] (14)2. Miguel Holden billie HERNANDEZ Marinelli, Jamel Steele., Lenord Shone., Vladimir Morales, [facility] (1999). Measuring the change indisability after inpatient rehabilitation; comparison of the responsiveness of the Barthel Index and Functional Boyd Measure. Journal of Neurology, Neurosurgery, and Psychiatry, 66(4), 302-123. Jody Perez, N.J.A, FABRICE Zabala, & Sonia Schumacher, M.A. (2004.) Assessment of post-stroke quality of life in cost-effectiveness studies: The usefulness of the Barthel Index and the EuroQoL-5D. Quality of Life Research, 13, 824-38         G codes: In compliance with CMSs Claims Based Outcome Reporting, the following G-code set was chosen for this patient based on their primary functional limitation being treated: The outcome measure chosen to determine the severity of the functional limitation was the barthel with a score of 70/100 which was correlated with the impairment scale.     ? Mobility - Walking and Moving Around:     - CURRENT STATUS: CJ - 20%-39% impaired, limited or restricted    - GOAL STATUS: CI - 1%-19% impaired, limited or restricted    - D/C STATUS:  ---------------To be determined---------------      Physical Therapy Evaluation Charge Determination   History Examination Presentation Decision-Making   MEDIUM  Complexity : 1-2 comorbidities / personal factors will impact the outcome/ POC  LOW Complexity : 1-2 Standardized tests and measures addressing body structure, function, activity limitation and / or participation in recreation  MEDIUM Complexity : Evolving with changing characteristics  Other outcome measures barthel  LOW       Based on the above components, the patient evaluation is determined to be of the following complexity level: LOW     Pain:  Pain Scale 1: Numeric (0 - 10)  Pain Intensity 1: 0              Activity Tolerance:   VSS on 2L  Please refer to the flowsheet for vital signs taken during this treatment. After treatment:   [x]         Patient left in no apparent distress sitting up in chair  []         Patient left in no apparent distress in bed  [x]         Call bell left within reach  [x]         Nursing notified  []         Caregiver present  []         Bed alarm activated    COMMUNICATION/EDUCATION:   The patients plan of care was discussed with: Occupational Therapist and Registered Nurse. [x]         Fall prevention education was provided and the patient/caregiver indicated understanding. []         Patient/family have participated as able in goal setting and plan of care. [x]         Patient/family agree to work toward stated goals and plan of care. []         Patient understands intent and goals of therapy, but is neutral about his/her participation. []         Patient is unable to participate in goal setting and plan of care.     Thank you for this referral.  Bill Schmitz, PT, DPT   Time Calculation: 16 mins

## 2018-05-12 NOTE — ROUTINE PROCESS
Primary Nurse Gwendolyn Valdez and Sly Patricio, SANDRA performed a dual skin assessment on this patient No impairment noted  Cody score is 21

## 2018-05-12 NOTE — PROGRESS NOTES
PCU SHIFT NURSING NOTE      Shift Summary:   Patient arrived to unit from CCU. A/Ox4. VSS. Scattered expiratory wheezing bilaterally. [email or web address]. Dual skin assessment completed (see note). Ambulates without gait disturbance and with one assist standby. See doc flow sheet for full assessment. Goals for therapy: continue to wean oxygen, manage respiratory symptoms, safety. Patient would like to have diet advanced tomorrow morning if possible. Bedside and Verbal shift change report given to Alex Gray RN (oncoming nurse) by Andrade Min RN (offgoing nurse). Report included the following information SBAR, Kardex, ED Summary, Procedure Summary, Intake/Output and MAR. Admission Date 5/10/2018   Admission Diagnosis Respiratory failure ([address]   Consults IP CONSULT TO PULMONOLOGY        Consults   [x]PT   [x]OT   []Speech   []Case Management      [] Palliative      Cardiac Monitoring Order   [x]Yes   []No     IV drips   []Yes    Drip:                            Dose:  Drip:                            Dose:  Drip:                            Dose:   [x]No     GI Prophylaxis   []Yes   [x]No         DVT Prophylaxis   SCDs:             Bubba stockings:         [x] Medication   []Contraindicated   []None      Activity Level Activity Level: Up with Assistance     Activity Assistance: Partial (one person)   Purposeful Rounding every 1-2 hour? [x]Yes   Mayers Score  Total Score: 2   Bed Alarm (If score 3 or >)   []Yes   [] Refused (See signed refusal form in chart)   Cody Score  Cody Score: 21   Cody Score (if score 14 or less)   []PMT consult   []Wound Care consult      []Specialty bed   [] Nutrition consult          Needs prior to discharge:   Home O2 required:    []Yes   [x]No    If yes, how much O2 required?     Other:    Last Bowel Movement: Last Bowel Movement Date: 05/08/18      Influenza Vaccine Received Flu Vaccine for Current Season (usually Sept-March): Yes        Pneumonia Vaccine           Diet Active Orders   Diet DIET DIABETIC CLEAR LIQUID      LDAs               Peripheral IV 05/10/18 Right Hand (Active)   Site Assessment Clean, dry, & intact 5/12/2018 12:09 PM   Phlebitis Assessment 0 5/12/2018 12:09 PM   Infiltration Assessment 0 5/12/2018 12:09 PM   Dressing Status Clean, dry, & intact 5/12/2018 12:09 PM   Dressing Type Tape;Transparent 5/12/2018 12:09 PM   Hub Color/Line Status Pink;Capped 5/12/2018 12:09 PM   Alcohol Cap Used Yes 5/11/2018  8:00 PM                      Urinary Catheter      Intake & Output   Date 05/11/18 0700 - 05/12/18 0659 05/12/18 0700 - 05/13/18 0659   Shift 5418-6148 6802-7335 24 Hour Total 6358-3213 0764-9711 24 Hour Total   I  N  T  A  K  E   P.O. 360  360 550  550      P. O. 360  360 550  550    Shift Total  (mL/kg) 360  (4.5)  360  (4.5) 550  (6.9)  550  (6.9)   O  U  T  P  U  T   Urine  (mL/kg/hr) 200  (0.2) 400  (0.4) 600  (0.3) 650  650      Urine Voided 200 400 600 650  650      Urine Occurrence(s) 1 x 2 x 3 x       Stool            Stool Occurrence(s)  1 x 1 x       Shift Total  (mL/kg) 200  (2.5) 400  (5) 600  (7.5) 650  (8.1)  650  (8.1)    -400 -240 -100  -100   Weight (kg) 79.9 79.9 79.9 79.9 79.9 79.9         Readmission Risk Assessment Tool Score Medium Risk            18       Total Score        3 Has Seen PCP in Last 6 Months (Yes=3, No=0)    2 . Living with Significant Other. Assisted Living. LTAC. SNF. or   Rehab    5 Pt.  Coverage (Medicare=5 , Medicaid, or Self-Pay=4)    8 Charlson Comorbidity Score (Age + Comorbid Conditions)        Criteria that do not apply:    Patient Length of Stay (>5 days = 3)    IP Visits Last 12 Months (1-3=4, 4=9, >4=11)       Expected Length of Stay 3d 16h   Actual Length of Stay 2

## 2018-05-12 NOTE — PROGRESS NOTES
Bedside and Verbal shift change report given to GLORIA Grimm RN (oncoming nurse) by DAVID Palumbo RN (offgoing nurse). Report included the following information SBAR, Kardex, Intake/Output, MAR, Recent Results and Cardiac Rhythm NSR.   0445: Assessment completed. Patient alert, oriented x 4. Moves all extremities. Tolerating 4L NC with moderate dyspnea on exertion, but patient reports \"it's nothing like yesterday morning. \" Lungs continue with expiratory and inspiratory wheeze throughout. Abdomen soft, non-tender, active bowel sounds. Peripheral pulses palpable. Voids in bedside commode. VSS.

## 2018-05-12 NOTE — PROGRESS NOTES
Hospitalist Progress Note    NAME: Yolande Zhang   :  1945   MRN:  [id number]       Assessment / Plan:  Acute hypoxic/hypercarbic respiratory failure due to severe persistent asthma with acute exacerbation:  Not on O2 prior to admission. Pt had rapid response  for respiratory distress/increased WOB. Remains very wheezy this morning, but says she is feeling better  - CXR this morning with no evidence of acute cardiopulmonary process  - con't BiPAP prn  - pulmonology consulted  - con't solumedrol with NPH  - atrovent/xopenex nebs every 4 hours  - con't INH steroid (on advair at home)  Non insulin dependent DM2 uncontrolled with steroid-induced hyperglycemia:  - con't additional NPH with solumedrol  - con't amaryl and actos  - lispro sliding scale  Benign HTN and CAD:  cardiac cath in , nonobstructive  - con't lisinopril, ASA, lipitor  - hydralazine prn  Anxiety with depression: con't home prn bedtime xanax, sertraline, wellbutrin       Code Status: full  Surrogate Decision Maker:  or daughter Thee French home [phone], cell [phone]  DVT Prophylaxis: lovenox     Subjective:     Chief Complaint / Reason for Physician Visit  \"I feel a lot better today\". Discussed with RN events overnight. Review of Systems:  Symptom Y/N Comments  Symptom Y/N Comments   Fever/Chills n   Chest Pain n    Poor Appetite n   Edema n    Cough n   Abdominal Pain n    Sputum n   Joint Pain     SOB/CURTIS y   Pruritis/Rash     Nausea/vomit    Tolerating PT/OT     Diarrhea    Tolerating Diet     Constipation    Other       Could NOT obtain due to:      Objective:     VITALS:   Last 24hrs VS reviewed since prior progress note.  Most recent are:  Patient Vitals for the past 24 hrs:   Temp Pulse Resp BP SpO2   18 0800 - 77 19 148/61 99 %   18 0740 - - - - 99 %   18 0700 - 63 18 - 98 %   18 0402 - - - - 97 %   18 0347 96.7 °F (35.9 °C) 68 21 149/77 97 %   18 0100 - (!) 58 16 106/42 99 % 05/12/18 0010 - - - - 98 %   05/12/18 0000 98.7 °F (37.1 °C) 71 19 147/63 99 %   05/11/18 2300 - 62 18 135/57 100 %   05/11/18 2200 - 76 21 152/68 99 %   05/11/18 2100 - 69 29 144/65 99 %   05/11/18 2000 97.1 °F (36.2 °C) 74 27 148/69 99 %   05/11/18 1952 - - - - 99 %   05/11/18 1900 - 69 20 157/66 98 %   05/11/18 1700 - 68 21 139/68 99 %   05/11/18 1649 - - - - 100 %   05/11/18 1500 97.7 °F (36.5 °C) 75 19 151/71 99 %   05/11/18 1400 - 83 21 143/63 99 %   05/11/18 1205 - - - - 100 %   05/11/18 1000 - 87 18 136/69 98 %   05/11/18 0924 - - - - 99 %   05/11/18 0905 98.5 °F (36.9 °C) 91 21 - 100 %   05/11/18 0900 - 92 22 133/69 -   05/11/18 0859 - 91 20 144/67 -       Intake/Output Summary (Last 24 hours) at 05/12/18 0817  Last data filed at 05/12/18 0420   Gross per 24 hour   Intake              360 ml   Output              600 ml   Net             -240 ml        PHYSICAL EXAM:  General: WD, WN. Alert, cooperative, no acute distress    EENT:  EOMI. Anicteric sclerae. MMM  Resp:  Moderate diffuse expiratory and inspiratory wheezing. Fair air movement. No accessory muscle use  CV:  Regular rhythm,  No edema  GI:  Soft, Non distended, Non tender.  +Bowel sounds  Neurologic:  Alert and oriented X 3, normal speech,   Psych:   Fair insight. Not anxious nor agitated  Skin:  No rashes. No jaundice    Reviewed most current lab test results and cultures  YES  Reviewed most current radiology test results   YES  Review and summation of old records today    NO  Reviewed patient's current orders and MAR    YES  PMH/SH reviewed - no change compared to H&P  ________________________________________________________________________  Care Plan discussed with:    Comments   Patient x    Family  x Discussed with Dtr Phillip Livingston over phone today   RN x    Care Manager     Consultant  x pulmonology                     Multidiciplinary team rounds were held today with , nursing, pharmacist and clinical coordinator.   Patient's plan of care was discussed; medications were reviewed and discharge planning was addressed. ________________________________________________________________________  Total NON critical care TIME:  25 Minutes    Total CRITICAL CARE TIME Spent:   Minutes non procedure based      Comments   >50% of visit spent in counseling and coordination of care x    ________________________________________________________________________  Rebekah Calderón MD     Procedures: see electronic medical records for all procedures/Xrays and details which were not copied into this note but were reviewed prior to creation of Plan. LABS:  I reviewed today's most current labs and imaging studies.   Pertinent labs include:  Recent Labs      05/12/18   0457  05/11/18   0406  05/10/18   0718   WBC  16.7*  13.6*  7.8   HGB  13.2  12.9  12.8   HCT  40.6  39.1  37.6   PLT  178  182  155     Recent Labs      05/12/18   0457  05/11/18   0406  05/10/18   0718   NA  138  138  137   K  4.1  4.0  4.6   CL  105  108  106   CO2  26  23  24   GLU  184*  318*  271*   BUN  27*  23*  28*   CREA  0.75  0.91  0.96   CA  9.2  9.6  9.1   MG  2.2   --   1.9   PHOS  3.5   --    --    ALB  3.8   --   3.6   TBILI  0.8   --   0.6   SGOT  41*   --   29   ALT  34   --   26       Signed: Rebekah Calderón MD

## 2018-05-12 NOTE — PROGRESS NOTES
Occupational Therapy EVALUATION/discharge  Patient: Antionette Young (74 y.o. female)  Date: 5/12/2018  Primary Diagnosis: Respiratory failure ([facility] [address]        Precautions:   Fall    ASSESSMENT:   Based on the objective data described below, the patient presents with stable O2 sat 90-91% with activity on 2 liters NC this session. Good balance and safety awareness. Pt is performing mobility at a SBA to supervision level and ADLS overall at a supervision level with main limitation with mobility which PT is addressing. Further skilled acute occupational therapy is not indicated at this time and pt is in agreement  Discharge Recommendations: home without services  Further Equipment Recommendations for Discharge: none      SUBJECTIVE:   Patient stated I just want to get up and move.     OBJECTIVE DATA SUMMARY:   HISTORY:   Past Medical History:   Diagnosis Date    Asthma     Cervical cancer ([facility] [address] 0    COPD     Depression with anxiety     Diabetes ([facility] [address]     currently on no meds    Other and unspecified hyperlipidemia     Type II or unspecified type diabetes mellitus without mention of complication, uncontrolled     A1c 9.2 8/2012;  used to see Dr. Kaycee Crow essential hypertension      Past Surgical History:   Procedure Laterality Date    CARDIAC CATHETERIZATION  2/21/2013         HX APPENDECTOMY      HX CERVICAL FUSION      HX AURY AND BSO      for cervical cancer       Prior Level of Function/Environment/Context: independent without assist devices     Expanded or extensive additional review of patient history:     Home Situation  Home Environment: Private residence  # Steps to Enter: 3  Rails to Enter: Yes  Hand Rails : Left  One/Two Story Residence: One story  Living Alone: No  Support Systems: Spouse/Significant Other/Partner  Patient Expects to be Discharged to[de-identified] Private residence  Current DME Used/Available at Home: Shower chair, Nebulizer  Tub or Shower Type: Shower  [x]  Right hand dominant   []  Left hand dominant    EXAMINATION OF PERFORMANCE DEFICITS:  Cognitive/Behavioral Status:  Neurologic State: Alert  Orientation Level: Oriented X4  Cognition: Appropriate decision making; Appropriate for age attention/concentration; Appropriate safety awareness  Perception: Appears intact  Perseveration: No perseveration noted  Safety/Judgement: Awareness of environment; Fall prevention;Home safety      Hearing: Auditory  Auditory Impairment: None    Vision/Perceptual:    Tracking: Able to track stimulus in all quadrants w/o difficulty                           Corrective Lenses: Reading glasses    Range of Motion:    AROM: Within functional limits  PROM: Within functional limits                      Strength:    Strength: Generally decreased, functional                Coordination:  Coordination: Within functional limits  Fine Motor Skills-Upper: Left Intact; Right Intact    Gross Motor Skills-Upper: Left Intact; Right Intact    Tone & Sensation:    Tone: Normal  Sensation: Intact                      Balance:  Sitting: Intact  Standing: Impaired  Standing - Static: Good;Constant support  Standing - Dynamic : Fair    Functional Mobility and Transfers for ADLs:  Bed Mobility:  Rolling: Supervision  Supine to Sit: Supervision  Scooting: Supervision    Transfers:  Sit to Stand: Stand-by assistance  Stand to Sit: Stand-by assistance  Bed to Chair: Stand-by assistance  Toilet Transfer : Stand-by assistance    ADL Assessment:  Feeding: Independent    Oral Facial Hygiene/Grooming: Supervision    Bathing: Supervision    Upper Body Dressing: Supervision    Lower Body Dressing: Supervision    Toileting: Supervision          Cognitive Retraining  Safety/Judgement: Awareness of environment; Fall prevention;Home safety      Functional Measure:  Barthel Index:    Bathin  Bladder: 5  Bowels: 10  Groomin  Dressing: 10  Feeding: 10  Mobility: 10  Stairs: 0  Toilet Use: 10  Transfer (Bed to Chair and Back): 10  Total: 70       Barthel and G-code impairment scale:  Percentage of impairment CH  0% CI  1-19% CJ  20-39% CK  40-59% CL  60-79% CM  80-99% CN  100%   Barthel Score 0-100 100 99-80 79-60 59-40 20-39 1-19   0   Barthel Score 0-20 20 17-19 13-16 9-12 5-8 1-4 0      The Barthel ADL Index: Guidelines  1. The index should be used as a record of what a patient does, not as a record of what a patient could do. 2. The main aim is to establish degree of independence from any help, physical or verbal, however minor and for whatever reason. 3. The need for supervision renders the patient not independent. 4. A patient's performance should be established using the best available evidence. Asking the patient, friends/relatives and nurses are the usual sources, but direct observation and common sense are also important. However direct testing is not needed. 5. Usually the patient's performance over the preceding 24-48 hours is important, but occasionally longer periods will be relevant. 6. Middle categories imply that the patient supplies over 50 per cent of the effort. 7. Use of aids to be independent is allowed. Megan Avina., Barthel, D.W. (4084). Functional evaluation: the Barthel Index. [facility] (14)2. DAYANNA ChavarriaF, Juliet Wilson., George Witt., [place], [address] [place] [address] (1999). Measuring the change indisability after inpatient rehabilitation; comparison of the responsiveness of the Barthel Index and Functional [place] Measure. Journal of Neurology, Neurosurgery, and Psychiatry, 66(4), 327-450. Topher Cheng, N.J.A, JENNIFER Zabala.AFTAB, & Mireille Godinez, M.A. (2004.) Assessment of post-stroke quality of life in cost-effectiveness studies: The usefulness of the Barthel Index and the EuroQoL-5D. Quality of Life Research, 13, 971-14         G codes:   In compliance with CMSs Claims Based Outcome Reporting, the following G-code set was chosen for this patient based on their primary functional limitation being treated: The outcome measure chosen to determine the severity of the functional limitation was the barthel with a score of 70/100 which was correlated with the impairment scale. ? Self Care:     - CURRENT STATUS: CJ - 20%-39% impaired, limited or restricted    - GOAL STATUS: CJ - 20%-39% impaired, limited or restricted    - D/C STATUS:  CJ - 20%-39% impaired, limited or restricted     Occupational Therapy Evaluation Charge Determination   History Examination Decision-Making   LOW Complexity : Brief history review  LOW Complexity : 1-3 performance deficits relating to physical, cognitive , or psychosocial skils that result in activity limitations and / or participation restrictions  LOW Complexity : No comorbidities that affect functional and no verbal or physical assistance needed to complete eval tasks       Based on the above components, the patient evaluation is determined to be of the following complexity level: LOW   Pain:  Pain Scale 1: Numeric (0 - 10)  Pain Intensity 1: 0              Activity Tolerance:     Please refer to the flowsheet for vital signs taken during this treatment. After treatment:   [x]  Patient left in no apparent distress sitting up in chair  []  Patient left in no apparent distress in bed  [x]  Call bell left within reach  [x]  Nursing notified  []  Caregiver present  []  Bed alarm activated    COMMUNICATION/EDUCATION:   Communication/Collaboration:  [x]      Home safety education was provided and the patient/caregiver indicated understanding. [x]      Patient have participated as able and agree with findings and recommendations. []      Patient is unable to participate in plan of care at this time.   Findings and recommendations were discussed with: Physical Therapist, Registered Nurse and patient    Ad Augustin OTR/L  Time Calculation: 17 mins

## 2018-05-12 NOTE — ROUTINE PROCESS
TRANSFER - IN REPORT:    Verbal report received from [address] (name) on Law Phillips  being received from CCU (unit) for routine progression of care      Report consisted of patients Situation, Background, Assessment and   Recommendations(SBAR). Information from the following report(s) SBAR, Kardex, ED Summary, Procedure Summary, Intake/Output, MAR, Accordion and Recent Results was reviewed with the receiving nurse. Opportunity for questions and clarification was provided. Assessment completed upon patients arrival to unit and care assumed.

## 2018-05-12 NOTE — PROGRESS NOTES
[facility]  Pulmonary, Critical Care, and Sleep Medicine    Name: Esdras Portillo MRN: [id number]   : 1945 Hospital: [facility]   Date: 2018        Critical Care Initial Patient Consult    IMPRESSION:   · Acute Hypercarbic Respiratory Failure,   · Pt has severe persistent Asthma with acute exacerbation. , baseline Fev1 is 1L, 45% pred. · Has a chronic cough. · Allergic Rhinitis, had negative skin and RAST testing in past.   · Hypertension  · Hyperglycemia, made worse by steroids. · Non smoker  · Acutely ill, moderate risk of decompensation. · Reviewed office notes from DR. Genoveva Franks, Reviewed notes from IM, ER on this admission. · Reviewed all labs, imaging from this admission. · Reviewed her outpt PFTs done last month. RECOMMENDATIONS:   · BiPAP PRN, may take off as tolerated   · On Breo, nebs  · ON mucolytics  · On steroids, solumedrol 40mg iv every 8 hrs. · Glycemic monitoring and control  · Will place on bipap as tolerated, Wean oxygen as tolerated  · Added Insulin 10U this am.  · On Xopenex and Atrovent  · Has tussionex prn. · On Enoxaparin for DVT prophylaxis. Subjective/History:      feels better today. Off BiPAP  Still diffuse wheezing       This patient has been seen and evaluated at the request of Dr. Agnes Trevino for above. Patient is a 68 y.o. female Pt has been seen by Dr. Genoveva Franks about 3-4 weeks ago in office. She noted to be getting worse and had increased dyspnea for the last 2 weeks. She got acutely worse and presented to the ER yesterday. She had taken 4 neb  Treatment prior to arrival without any improvement. Had increased cough mainly was clear and then developed yellow tinge and was thicker. She usually has some dyspnea on exertion but on day of admission noted that the dyspnea was occurring at rest as well. Had associated symptom of chest tightness as well. She has been compliant with her advair and albuterol prn.  NO recent steroid or Abx treatments. No acute fevers, chills, sweats, leg swelling or palpitations. Who presented yesterday with increased breathing difficulty. She has progressed to have increased difficulty breathing. Increased wheezing. She get about 1 hr of relief with the neb treatments. Pt did not some improvement when placed on bipap this am.    She has been tolerating bipap well. Last seen in office by Dr. Joseline Felder on 4-16-18. Was followed up for asthma. She has 2 exacerbations last year. Has exertional shortness of breath, nonproductive cough. She has worse symptoms by environmental changes. Cold air exposure. Exposure to smoke. Denies fevers, chills, sweats. No GERD. Denies any aspiration. She has been using her albuterol more frequently without any improvement. She had prior skin testing years ago and everything was negative. Pt has still been walking and working on regular basis. She had gained near 30 lbs over the last year. She works in [employer]. She has 2 dogs in her home. NO birds.   No etoh use. Has been on Albuterol nebs at home  Ventolin prn  Advair 500/50, on inh bid. Alprazolam prn      PFTs on 4-16-18 in office:  FVC: 2.3L 78% pred  FEV1: 1L 45% pred  FEV1/FVC: 44%    42%  Bronchodilator response     RV/TLC is 132% pred  DLCO /VA is 114%.       Past Medical History:   Diagnosis Date    Asthma     Cervical cancer ([facility] [address] 1981    COPD     Depression with anxiety     Diabetes ([facility] [address]     currently on no meds    Other and unspecified hyperlipidemia     Type II or unspecified type diabetes mellitus without mention of complication, uncontrolled     A1c 9.2 8/2012;  used to see Dr. Giselle Jung essential hypertension       Past Surgical History:   Procedure Laterality Date    CARDIAC CATHETERIZATION  2/21/2013         HX APPENDECTOMY      HX CERVICAL FUSION      HX AURY AND BSO      for cervical cancer      Prior to Admission medications Medication Sig Start Date End Date Taking? Authorizing Provider   lisinopril (PRINIVIL, ZESTRIL) 10 mg tablet Take 5 mg by mouth daily. Yes Mendoza Martinez MD   buPROPion XL (WELLBUTRIN XL) 300 mg XL tablet Take 300 mg by mouth every morning. Yes Mendoza Martinez MD   glimepiride (AMARYL) 2 mg tablet Take 2 mg by mouth every morning. Yes Mendoza Martinez MD   atorvastatin (LIPITOR) 20 mg tablet Take 20 mg by mouth daily. Yes Mendoza Martinez MD   aspirin delayed-release 81 mg tablet Take 81 mg by mouth daily. Yes Historical Provider   pioglitazone (ACTOS) 15 mg tablet Take 15 mg by mouth daily. Yes Historical Provider   dextromethorphan-guaiFENesin (ROBITUSSIN-DM)  mg/5 mL syrup Take 10 mL by mouth every four (4) hours as needed for Cough. Yes Historical Provider   cholecalciferol (VITAMIN D3) 1,000 unit tablet Take 1,000 Units by mouth daily. Yes Historical Provider   cyanocobalamin 1,000 mcg tablet Take 1,000 mcg by mouth daily. Yes Historical Provider   albuterol-ipratropium (DUONEB) 2.5 mg-0.5 mg/3 ml nebulizer solution 3 mL by Nebulization route every four (4) hours as needed for Wheezing. 2/25/13  Yes Brittney Hudson MD   ALPRAZolam Gideon Tejeda) 0.5 mg tablet Take 0.5 mg by mouth two (2) times daily as needed. Yes Historical Provider   albuterol (PROVENTIL, VENTOLIN) 90 mcg/actuation inhaler Take 1-2 Puffs by inhalation every four (4) hours as needed for Wheezing. 2/18/13  Yes Marina Marin MD   fluticasone-salmeterol (ADVAIR DISKUS) 500-50 mcg/dose diskus inhaler Take 1 Puff by inhalation every twelve (12) hours. Yes Historical Provider   sertraline (ZOLOFT) 100 mg tablet Take 150 mg by mouth daily.    Yes Historical Provider     Current Facility-Administered Medications   Medication Dose Route Frequency    ipratropium (ATROVENT) 0.02 % nebulizer solution 0.5 mg  0.5 mg Nebulization Q4H RT    metoprolol (LOPRESSOR) injection 5 mg  5 mg IntraVENous Q6H    oxybutynin chloride XL (DITROPAN XL) tablet 10 mg  10 mg Oral DAILY    mupirocin (BACTROBAN) 2 % ointment   Both Nostrils Q12H    sertraline (ZOLOFT) tablet 200 mg  200 mg Oral DAILY    methylPREDNISolone (PF) (SOLU-MEDROL) injection 40 mg  40 mg IntraVENous Q8H    Or    insulin NPH (NOVOLIN N, HUMULIN N) injection 10 Units  10 Units SubCUTAneous Q8H    insulin lispro (HUMALOG) injection   SubCUTAneous AC&HS    guaiFENesin ER (MUCINEX) tablet 600 mg  600 mg Oral Q12H    sodium chloride (NS) flush 5-10 mL  5-10 mL IntraVENous Q8H    enoxaparin (LOVENOX) injection 40 mg  40 mg SubCUTAneous Q24H    levalbuterol (XOPENEX) nebulizer soln 1.25 mg/3 mL  1.25 mg Nebulization Q4H RT    glimepiride (AMARYL) tablet 2 mg  2 mg Oral ACB    lisinopril (PRINIVIL, ZESTRIL) tablet 5 mg  5 mg Oral DAILY    atorvastatin (LIPITOR) tablet 20 mg  20 mg Oral DAILY    buPROPion XL (WELLBUTRIN XL) tablet 300 mg  300 mg Oral 7am    fluticasone-vilanterol (BREO ELLIPTA) 200mcg-25mcg/puff  1 Puff Inhalation DAILY    levoFLOXacin (LEVAQUIN) tablet 250 mg  250 mg Oral Q24H    aspirin delayed-release tablet 81 mg  81 mg Oral DAILY    sertraline (ZOLOFT) tablet 150 mg  150 mg Oral DAILY    pioglitazone (ACTOS) tablet 15 mg  15 mg Oral 7am     Allergies   Allergen Reactions    Metformin Nausea and Vomiting      Social History   Substance Use Topics    Smoking status: Never Smoker    Smokeless tobacco: Never Used      Comment: exposed to second hand smoking in past    Alcohol use No      Comment: rarely a mixed drink      Family History   Problem Relation Age of Onset    Heart Disease Neg Hx     Stroke Neg Hx         Review of Systems:  Constitutional: positive for fatigue and malaise  Eyes: negative  Ears, nose, mouth, throat, and face: positive for nasal congestion  Respiratory: positive for cough, asthma, wheezing, dyspnea on exertion or chronic bronchitis  Cardiovascular: positive for dyspnea, fatigue, paroxysmal nocturnal dyspnea, tachypnea, dyspnea on exertion  Gastrointestinal: negative  Genitourinary:negative  Integument/breast: negative  Hematologic/lymphatic: negative  Musculoskeletal:negative  Neurological: negative  Behavioral/Psych: negative  Endocrine: negative  Allergic/Immunologic: negative    Objective:   Vital Signs:    Visit Vitals    /61    Pulse 84    Temp 96.7 °F (35.9 °C)    Resp 20    Ht 5' 5\" (1.651 m)    Wt 79.9 kg (176 lb 2.4 oz)    SpO2 96%    BMI 29.31 kg/m2       O2 Device: Nasal cannula   O2 Flow Rate (L/min): 2 l/min   Temp (24hrs), Av.7 °F (36.5 °C), Min:96.7 °F (35.9 °C), Max:98.7 °F (37.1 °C)       Intake/Output:   Last shift:      701 - 1900  In: -   Out: 300 [Urine:300]  Last 3 shifts: 05/10 1901 - 700  In: 360 [P.O.:360]  Out: 600 [Urine:600]    Intake/Output Summary (Last 24 hours) at 18  Last data filed at 18 0815   Gross per 24 hour   Intake              360 ml   Output              900 ml   Net             -540 ml     Hemodynamics:   PAP:   CO:     Wedge:   CI:     CVP:    SVR:       PVR:       Ventilator Settings:  Mode Rate Tidal Volume Pressure FiO2 PEEP            40 %       Peak airway pressure:      Minute ventilation: 11.1 l/min      Physical Exam:    General:  Alert, cooperative, no distress, appears stated age. Head:  Normocephalic, without obvious abnormality, atraumatic. Eyes:  Conjunctivae/corneas clear. PERRL, EOMs intact. Nose: Nares normal. Septum midline. Mucosa normal. No drainage or sinus tenderness. Throat: Lips, mucosa, and tongue normal. Teeth and gums normal.   Neck: Supple, symmetrical, trachea midline, no adenopathy, thyroid: no enlargment/tenderness/nodules, no carotid bruit and no JVD. Back:   Symmetric, no curvature. ROM normal.   Lungs:   Has moderate wheezing, increased work of breathing. Chest wall:  No tenderness or deformity. Heart:  Regular rate and rhythm, S1, S2 normal, no murmur, click, rub or gallop.    Abdomen:   Soft, non-tender. Bowel sounds normal. No masses,  No organomegaly. Extremities: Extremities normal, atraumatic, no cyanosis or edema. Pulses: 2+ and symmetric all extremities. Skin: Skin color, texture, turgor normal. No rashes or lesions   Lymph nodes: Cervical, supraclavicular, and axillary nodes normal.   Neurologic: Grossly nonfocal, seems to have symmetric strength of BUE and BLE. Psych: no overt anxiety or depression.         Data:     Recent Results (from the past 24 hour(s))   GLUCOSE, POC    Collection Time: 05/11/18 11:39 AM   Result Value Ref Range    Glucose (POC) 239 (H) 65 - 100 mg/dL    Performed by Daniel Schilling    URINALYSIS W/ REFLEX CULTURE    Collection Time: 05/11/18 12:00 PM   Result Value Ref Range    Color YELLOW/STRAW      Appearance CLOUDY (A) CLEAR      Specific gravity 1.028 1.003 - 1.030      pH (UA) 6.0 5.0 - 8.0      Protein 30 (A) NEG mg/dL    Glucose 500 (A) NEG mg/dL    Ketone TRACE (A) NEG mg/dL    Bilirubin NEGATIVE  NEG      Blood NEGATIVE  NEG      Urobilinogen 0.2 0.2 - 1.0 EU/dL    Nitrites NEGATIVE  NEG      Leukocyte Esterase NEGATIVE  NEG      WBC 5-10 0 - 4 /hpf    RBC 0-5 0 - 5 /hpf    Epithelial cells MANY (A) FEW /lpf    Bacteria NEGATIVE  NEG /hpf    UA:UC IF INDICATED URINE CULTURE ORDERED (A) CNI      Mucus TRACE (A) NEG /lpf    Hyaline cast 2-5 0 - 5 /lpf   GLUCOSE, POC    Collection Time: 05/11/18  5:07 PM   Result Value Ref Range    Glucose (POC) 166 (H) 65 - 100 mg/dL    Performed by Raúl Haskins    GLUCOSE, POC    Collection Time: 05/11/18  9:45 PM   Result Value Ref Range    Glucose (POC) 145 (H) 65 - 100 mg/dL    Performed by Javad Lundberg    CBC WITH AUTOMATED DIFF    Collection Time: 05/12/18  4:57 AM   Result Value Ref Range    WBC 16.7 (H) 3.6 - 11.0 K/uL    RBC 4.27 3.80 - 5.20 M/uL    HGB 13.2 11.5 - 16.0 g/dL    HCT 40.6 35.0 - 47.0 %    MCV 95.1 80.0 - 99.0 FL    MCH 30.9 26.0 - 34.0 PG    MCHC 32.5 30.0 - 36.5 g/dL    RDW 12.0 11.5 - 14.5 % PLATELET 691 868 - 240 K/uL    MPV 10.6 8.9 - 12.9 FL    NRBC 0.0 0  WBC    ABSOLUTE NRBC 0.00 0.00 - 0.01 K/uL    NEUTROPHILS 89 (H) 32 - 75 %    LYMPHOCYTES 6 (L) 12 - 49 %    MONOCYTES 4 (L) 5 - 13 %    EOSINOPHILS 0 0 - 7 %    BASOPHILS 0 0 - 1 %    IMMATURE GRANULOCYTES 1 (H) 0.0 - 0.5 %    ABS. NEUTROPHILS 14.8 (H) 1.8 - 8.0 K/UL    ABS. LYMPHOCYTES 1.1 0.8 - 3.5 K/UL    ABS. MONOCYTES 0.6 0.0 - 1.0 K/UL    ABS. EOSINOPHILS 0.0 0.0 - 0.4 K/UL    ABS. BASOPHILS 0.0 0.0 - 0.1 K/UL    ABS. IMM. GRANS. 0.2 (H) 0.00 - 0.04 K/UL    DF AUTOMATED     METABOLIC PANEL, COMPREHENSIVE    Collection Time: 05/12/18  4:57 AM   Result Value Ref Range    Sodium 138 136 - 145 mmol/L    Potassium 4.1 3.5 - 5.1 mmol/L    Chloride 105 97 - 108 mmol/L    CO2 26 21 - 32 mmol/L    Anion gap 7 5 - 15 mmol/L    Glucose 184 (H) 65 - 100 mg/dL    BUN 27 (H) 6 - 20 MG/DL    Creatinine 0.75 0.55 - 1.02 MG/DL    BUN/Creatinine ratio 36 (H) 12 - 20      GFR est AA >60 >60 ml/min/1.73m2    GFR est non-AA >60 >60 ml/min/1.73m2    Calcium 9.2 8.5 - 10.1 MG/DL    Bilirubin, total 0.8 0.2 - 1.0 MG/DL    ALT (SGPT) 34 12 - 78 U/L    AST (SGOT) 41 (H) 15 - 37 U/L    Alk. phosphatase 66 45 - 117 U/L    Protein, total 7.6 6.4 - 8.2 g/dL    Albumin 3.8 3.5 - 5.0 g/dL    Globulin 3.8 2.0 - 4.0 g/dL    A-G Ratio 1.0 (L) 1.1 - 2.2     MAGNESIUM    Collection Time: 05/12/18  4:57 AM   Result Value Ref Range    Magnesium 2.2 1.6 - 2.4 mg/dL   PHOSPHORUS    Collection Time: 05/12/18  4:57 AM   Result Value Ref Range    Phosphorus 3.5 2.6 - 4.7 MG/DL             Telemetry:NSR with occ PVCs.      Reviewed CXR no I/E    Emelia Perales MD

## 2018-05-12 NOTE — PROGRESS NOTES
Pharmacy Automatic Renal Dosing Protocol - Antimicrobials    Indication for Antimicrobials: COPD    Current Regimen of Each Antimicrobial:  Levaquin 500 mg every 24 hours (Start Date 5/10 Day # 3)    Previous Antimicrobial Therapy:  none    Significant Cultures:    UCx - pending    Radiology / Imaging results: (X-ray, CT scan or MRI):   5/10 chest xr- No acute cardiopulmonary disease. Paralysis, amputations, malnutrition: none documented    Labs:  Recent Labs      18   0457  18   0406  05/10/18   0718   CREA  0.75  0.91  0.96   BUN  27*  23*  28*   WBC  16.7*  13.6*  7.8     Temp (24hrs), Av.6 °F (36.4 °C), Min:96.7 °F (35.9 °C), Max:98.7 °F (37.1 °C)    Creatinine Clearance (mL/min) or Dialysis:   Estimated Creatinine Clearance: 70.9 mL/min (based on Cr of 0.75). Estimated Creatinine Clearance (using IBW):61.0 mL/min    Impression/Plan:   · Afebrile, WBC increased but remains on SoMe, renal improved  · Adjuste LQ to 500mg PO Daily for  -  remainder of coarse due to improved SCr (extra 250mg today)  · Antimicrobial stop date - pending  · May consider re-order CBC and BMP for Tue am     Pharmacy will follow daily and adjust medications as appropriate for renal function and/or serum levels.     Thank you,  Paula Vincent, [facility]

## 2018-05-12 NOTE — PROGRESS NOTES
Received report from [facility], patient currently sleeping. 0815  Awake and alert, see assessment. Instructed to call for help when getting out of bed. Compliant, voided on BSC with RN in room. Denies pain. Very talkative and pleasant. Currently on Nasal Canula at 3L. To move to PCU if room is available. Dr. Trinity Barbosa in to see patient. 0945  Currently sitting in chair after walking with PT. Will monitor. 1310  Remains in chair, reading. Consumed few clear liquids. Reminded to drink some water. 1548 TRANSFER - OUT REPORT:    Verbal report given to Jackelyn(name) on Eder Santiago  being transferred to PCU 2241(unit) for routine progression of care       Report consisted of patients Situation, Background, Assessment and   Recommendations(SBAR). Information from the following report(s) SBAR and Kardex was reviewed with the receiving nurse. Lines:   Peripheral IV 05/10/18 Right Hand (Active)   Site Assessment Clean, dry, & intact 5/12/2018 12:09 PM   Phlebitis Assessment 0 5/12/2018 12:09 PM   Infiltration Assessment 0 5/12/2018 12:09 PM   Dressing Status Clean, dry, & intact 5/12/2018 12:09 PM   Dressing Type Tape;Transparent 5/12/2018 12:09 PM   Hub Color/Line Status Pink;Capped 5/12/2018 12:09 PM   Alcohol Cap Used Yes 5/11/2018  8:00 PM        Opportunity for questions and clarification was provided. Patient transported with:   O2 @ 2 liters  Patient-specific medications from Pharmacy  Registered Nurse    Transferred to [facility] [phone] via wheelchair with monitor and oxygen. Connected to oxygen, nurse getting tele box. To chair with little help. Very pleasant and cooperative.

## 2018-05-13 LAB
BACTERIA SPEC CULT: NORMAL
CC UR VC: NORMAL
GLUCOSE BLD STRIP.AUTO-MCNC: 140 MG/DL (ref 65–100)
GLUCOSE BLD STRIP.AUTO-MCNC: 144 MG/DL (ref 65–100)
GLUCOSE BLD STRIP.AUTO-MCNC: 172 MG/DL (ref 65–100)
GLUCOSE BLD STRIP.AUTO-MCNC: 208 MG/DL (ref 65–100)
GLUCOSE BLD STRIP.AUTO-MCNC: 239 MG/DL (ref 65–100)
SERVICE CMNT-IMP: ABNORMAL
SERVICE CMNT-IMP: NORMAL

## 2018-05-13 PROCEDURE — 94640 AIRWAY INHALATION TREATMENT: CPT

## 2018-05-13 PROCEDURE — 74011636637 HC RX REV CODE- 636/637: Performed by: INTERNAL MEDICINE

## 2018-05-13 PROCEDURE — 74011250636 HC RX REV CODE- 250/636: Performed by: INTERNAL MEDICINE

## 2018-05-13 PROCEDURE — 74011000250 HC RX REV CODE- 250: Performed by: INTERNAL MEDICINE

## 2018-05-13 PROCEDURE — 65660000000 HC RM CCU STEPDOWN

## 2018-05-13 PROCEDURE — 74011250637 HC RX REV CODE- 250/637: Performed by: INTERNAL MEDICINE

## 2018-05-13 PROCEDURE — 82962 GLUCOSE BLOOD TEST: CPT

## 2018-05-13 RX ORDER — PREDNISONE 20 MG/1
40 TABLET ORAL
Status: DISCONTINUED | OUTPATIENT
Start: 2018-05-13 | End: 2018-05-14

## 2018-05-13 RX ORDER — ALBUTEROL SULFATE 0.83 MG/ML
2.5 SOLUTION RESPIRATORY (INHALATION)
Status: DISCONTINUED | OUTPATIENT
Start: 2018-05-13 | End: 2018-05-15 | Stop reason: HOSPADM

## 2018-05-13 RX ORDER — ALBUTEROL SULFATE 0.83 MG/ML
2.5 SOLUTION RESPIRATORY (INHALATION)
Status: DISCONTINUED | OUTPATIENT
Start: 2018-05-13 | End: 2018-05-14

## 2018-05-13 RX ADMIN — HUMAN INSULIN 10 UNITS: 100 INJECTION, SUSPENSION SUBCUTANEOUS at 06:21

## 2018-05-13 RX ADMIN — GLIMEPIRIDE 2 MG: 1 TABLET ORAL at 08:33

## 2018-05-13 RX ADMIN — LISINOPRIL 5 MG: 5 TABLET ORAL at 08:33

## 2018-05-13 RX ADMIN — SERTRALINE HYDROCHLORIDE 150 MG: 50 TABLET ORAL at 08:33

## 2018-05-13 RX ADMIN — INSULIN LISPRO 2 UNITS: 100 INJECTION, SOLUTION INTRAVENOUS; SUBCUTANEOUS at 08:31

## 2018-05-13 RX ADMIN — INSULIN LISPRO 2 UNITS: 100 INJECTION, SOLUTION INTRAVENOUS; SUBCUTANEOUS at 12:10

## 2018-05-13 RX ADMIN — LEVALBUTEROL HYDROCHLORIDE 1.25 MG: 1.25 SOLUTION RESPIRATORY (INHALATION) at 04:40

## 2018-05-13 RX ADMIN — LEVOFLOXACIN 500 MG: 500 TABLET, FILM COATED ORAL at 10:57

## 2018-05-13 RX ADMIN — IPRATROPIUM BROMIDE 0.5 MG: 0.5 SOLUTION RESPIRATORY (INHALATION) at 04:40

## 2018-05-13 RX ADMIN — Medication 10 ML: at 13:27

## 2018-05-13 RX ADMIN — INSULIN LISPRO 2 UNITS: 100 INJECTION, SOLUTION INTRAVENOUS; SUBCUTANEOUS at 16:53

## 2018-05-13 RX ADMIN — HYDROCODONE POLISTIREX AND CHLORPHENIRAMINE POLISTIREX 5 ML: 10; 8 SUSPENSION, EXTENDED RELEASE ORAL at 20:20

## 2018-05-13 RX ADMIN — ATORVASTATIN CALCIUM 20 MG: 20 TABLET, FILM COATED ORAL at 08:33

## 2018-05-13 RX ADMIN — METOPROLOL TARTRATE 5 MG: 5 INJECTION, SOLUTION INTRAVENOUS at 03:00

## 2018-05-13 RX ADMIN — IPRATROPIUM BROMIDE 0.5 MG: 0.5 SOLUTION RESPIRATORY (INHALATION) at 08:56

## 2018-05-13 RX ADMIN — GUAIFENESIN 600 MG: 600 TABLET, EXTENDED RELEASE ORAL at 20:20

## 2018-05-13 RX ADMIN — PIOGLITAZONE 15 MG: 15 TABLET ORAL at 06:25

## 2018-05-13 RX ADMIN — Medication 10 ML: at 21:15

## 2018-05-13 RX ADMIN — ASPIRIN 81 MG: 81 TABLET, COATED ORAL at 08:33

## 2018-05-13 RX ADMIN — PREDNISONE 40 MG: 20 TABLET ORAL at 16:52

## 2018-05-13 RX ADMIN — BUPROPION HYDROCHLORIDE 300 MG: 150 TABLET, FILM COATED, EXTENDED RELEASE ORAL at 06:25

## 2018-05-13 RX ADMIN — LEVALBUTEROL HYDROCHLORIDE 1.25 MG: 1.25 SOLUTION RESPIRATORY (INHALATION) at 08:54

## 2018-05-13 RX ADMIN — FLUTICASONE FUROATE AND VILANTEROL TRIFENATATE 1 PUFF: 200; 25 POWDER RESPIRATORY (INHALATION) at 08:34

## 2018-05-13 RX ADMIN — ALBUTEROL SULFATE 2.5 MG: 2.5 SOLUTION RESPIRATORY (INHALATION) at 12:03

## 2018-05-13 RX ADMIN — INSULIN LISPRO 2 UNITS: 100 INJECTION, SOLUTION INTRAVENOUS; SUBCUTANEOUS at 22:51

## 2018-05-13 RX ADMIN — MUPIROCIN: 20 OINTMENT TOPICAL at 20:48

## 2018-05-13 RX ADMIN — OXYBUTYNIN CHLORIDE 10 MG: 5 TABLET, FILM COATED, EXTENDED RELEASE ORAL at 08:33

## 2018-05-13 RX ADMIN — METOPROLOL TARTRATE 5 MG: 5 INJECTION, SOLUTION INTRAVENOUS at 20:20

## 2018-05-13 RX ADMIN — ALPRAZOLAM 0.5 MG: 0.5 TABLET ORAL at 20:20

## 2018-05-13 RX ADMIN — ENOXAPARIN SODIUM 40 MG: 40 INJECTION SUBCUTANEOUS at 08:31

## 2018-05-13 RX ADMIN — GUAIFENESIN 600 MG: 600 TABLET, EXTENDED RELEASE ORAL at 08:33

## 2018-05-13 RX ADMIN — Medication 10 ML: at 06:26

## 2018-05-13 RX ADMIN — INSULIN HUMAN 5 UNITS: 100 INJECTION, SUSPENSION SUBCUTANEOUS at 16:54

## 2018-05-13 RX ADMIN — MUPIROCIN: 20 OINTMENT TOPICAL at 08:34

## 2018-05-13 NOTE — PROGRESS NOTES
Hospitalist Progress Note    NAME: Orlin Branch   :  1945   MRN:  [id number]       Assessment / Plan:  Acute hypoxic/hypercarbic respiratory failure due to severe persistent asthma with acute exacerbation:  Not on O2 prior to admission. Pt had rapid response  for respiratory distress/increased WOB, now improved x2 days  - CXR  with no evidence of acute cardiopulmonary process  - con't BiPAP prn, did not need overnight. Weaning O2 today as possible. - pulmonology consult appreciated  - changing solumedrol to oral prednisone today  - atrovent/xopenex changed to albuterol q8hrs as she has improved  - con't INH steroid (on advair at home)  - con't to mobilize as able  - needs close f/u with Dr. Cal Maher when improved  Non insulin dependent DM2 uncontrolled with steroid-induced hyperglycemia:  - con't additional NPH with prednisone  - con't amaryl and actos (home meds)  - lispro sliding scale  Benign HTN and CAD:  cardiac cath in , nonobstructive  - con't lisinopril, ASA, lipitor  - hydralazine prn  Anxiety with depression: con't home prn bedtime xanax, sertraline, wellbutrin       Code Status: full  Surrogate Decision Maker:  or daughter Mil Can home [phone], cell [phone]  DVT Prophylaxis: lovenox     Subjective:     Chief Complaint / Reason for Physician Visit  \"I'm doing a little better. \"  Decreased wheezing, able to  bathroom and do grooming today. Discussed with RN events overnight. Review of Systems:  Symptom Y/N Comments  Symptom Y/N Comments   Fever/Chills n   Chest Pain n    Poor Appetite n   Edema n    Cough y   Abdominal Pain n    Sputum n   Joint Pain     SOB/CURTIS y   Pruritis/Rash     Nausea/vomit    Tolerating PT/OT     Diarrhea    Tolerating Diet     Constipation    Other       Could NOT obtain due to:      Objective:     VITALS:   Last 24hrs VS reviewed since prior progress note.  Most recent are:  Patient Vitals for the past 24 hrs:   Temp Pulse Resp BP SpO2 05/13/18 0854 - - - - 98 %   05/13/18 0737 98.3 °F (36.8 °C) 76 20 125/63 98 %   05/13/18 0438 - - - - 97 %   05/13/18 0259 98.2 °F (36.8 °C) 82 20 122/68 97 %   05/13/18 0258 - 82 - - 97 %   05/13/18 0257 98.2 °F (36.8 °C) - - - -   05/13/18 0011 98 °F (36.7 °C) 72 18 104/58 96 %   05/13/18 0007 98 °F (36.7 °C) - - - -   05/12/18 2332 - - - - 98 %   05/12/18 2022 - 76 - - 96 %   05/12/18 2021 97 °F (36.1 °C) 81 16 108/53 -   05/12/18 1933 - - - - 98 %   05/12/18 1656 - - - - 98 %   05/12/18 1635 98 °F (36.7 °C) 65 14 141/65 98 %   05/12/18 1521 - 72 15 140/42 98 %   05/12/18 1519 - 70 15 - 97 %   05/12/18 1518 - 70 15 - 98 %   05/12/18 1500 - 73 19 - 97 %   05/12/18 1300 - 68 19 125/60 97 %   05/12/18 1209 97.7 °F (36.5 °C) 65 19 - 100 %   05/12/18 1200 - 67 17 - 100 %   05/12/18 1133 - - - - 96 %       Intake/Output Summary (Last 24 hours) at 05/13/18 1032  Last data filed at 05/12/18 1317   Gross per 24 hour   Intake              100 ml   Output              350 ml   Net             -250 ml        PHYSICAL EXAM:  General: WD, WN. Alert, cooperative, no acute distress    EENT:  EOMI. Anicteric sclerae. MMM  Resp:  MIld-moderate expiratory wheezing. No accessory muscle use  CV:  Regular  rhythm,  No edema  GI:  Soft, Non distended, Non tender.  +Bowel sounds  Neurologic:  Alert and oriented X 3, normal speech,   Psych:   Fair insight. Not anxious nor agitated  Skin:  No rashes. No jaundice. Pale.     Reviewed most current lab test results and cultures  YES  Reviewed most current radiology test results   YES  Review and summation of old records today    NO  Reviewed patient's current orders and MAR    YES  PMH/SH reviewed - no change compared to H&P  ________________________________________________________________________  Care Plan discussed with:    Comments   Patient x    Family      RN x    Care Manager     Consultant                        Multidiciplinary team rounds were held today with , nursing, pharmacist and clinical coordinator. Patient's plan of care was discussed; medications were reviewed and discharge planning was addressed. ________________________________________________________________________  Total NON critical care TIME:  25 Minutes    Total CRITICAL CARE TIME Spent:   Minutes non procedure based      Comments   >50% of visit spent in counseling and coordination of care x    ________________________________________________________________________  Jackeline Claire MD     Procedures: see electronic medical records for all procedures/Xrays and details which were not copied into this note but were reviewed prior to creation of Plan. LABS:  I reviewed today's most current labs and imaging studies.   Pertinent labs include:  Recent Labs      05/12/18   0457  05/11/18   0406   WBC  16.7*  13.6*   HGB  13.2  12.9   HCT  40.6  39.1   PLT  178  182     Recent Labs      05/12/18   0457  05/11/18   0406   NA  138  138   K  4.1  4.0   CL  105  108   CO2  26  23   GLU  184*  318*   BUN  27*  23*   CREA  0.75  0.91   CA  9.2  9.6   MG  2.2   --    PHOS  3.5   --    ALB  3.8   --    TBILI  0.8   --    SGOT  41*   --    ALT  34   --        Signed: Jackeline Claire MD

## 2018-05-13 NOTE — PROGRESS NOTES
Problem: Falls - Risk of  Goal: *Absence of Falls  Document Nayely Fall Risk and appropriate interventions in the flowsheet.    Outcome: Progressing Towards Goal  Fall Risk Interventions:            Medication Interventions: Patient to call before getting OOB, Teach patient to arise slowly    Elimination Interventions: Bed/chair exit alarm, Elevated toilet seat, Toileting schedule/hourly rounds, Patient to call for help with toileting needs, Toilet paper/wipes in reach, Call light in reach    History of Falls Interventions: Evaluate medications/consider consulting pharmacy, Room close to nurse's station, Utilize gait belt for transfer/ambulation, Door open when patient unattended

## 2018-05-13 NOTE — PROGRESS NOTES
PCU SHIFT NURSING NOTE      Bedside and Verbal shift change report given to Dorys Haley (oncoming nurse) by Racheal Lira (offgoing nurse). Report included the following information SBAR, Kardex, Procedure Summary, Intake/Output, and Mar.    Shift Summary:   1910 Bedside reporting; pt resting comfortably  2118 ; received 10 units of insulin NPH and solumedrol; received 2 units of insulin lispro  0600 ; received 10 units of insulin NPH and solumedrol        Admission Date 5/10/2018   Admission Diagnosis Respiratory failure ([address]   Consults IP CONSULT TO PULMONOLOGY        Consults   [x]PT   []OT   []Speech   [x]Case Management      [] Palliative      Cardiac Monitoring Order   [x]Yes   []No     IV drips   []Yes    Drip:                            Dose:  Drip:                            Dose:  Drip:                            Dose:   [x]No     GI Prophylaxis   []Yes   [x]No         DVT Prophylaxis   SCDs:             Bubba stockings:         [x] Medication   []Contraindicated   []None      Activity Level Activity Level: Up with Assistance     Activity Assistance: Partial (one person)   Purposeful Rounding every 1-2 hour? [x]Yes   Mayers Score  Total Score: 2   Bed Alarm (If score 3 or >)   []Yes   [] Refused (See signed refusal form in chart)   Cody Score  Cody Score: 21   Cody Score (if score 14 or less)   []PMT consult   []Wound Care consult      []Specialty bed   [x] Nutrition consult          Needs prior to discharge:   Home O2 required:    []Yes   []No    If yes, how much O2 required?     Other:    Last Bowel Movement: Last Bowel Movement Date: 05/11/18      Influenza Vaccine Received Flu Vaccine for Current Season (usually Sept-March): Yes        Pneumonia Vaccine           Diet Active Orders   Diet    DIET DIABETIC CLEAR LIQUID      LDAs               Peripheral IV 05/10/18 Right Hand (Active)   Site Assessment Clean, dry, & intact 5/12/2018  7:30 PM   Phlebitis Assessment 0 5/12/2018  7:30 PM Infiltration Assessment 0 5/12/2018  7:30 PM   Dressing Status Clean, dry, & intact 5/12/2018  7:30 PM   Dressing Type Transparent;Tape 5/12/2018  7:30 PM   Hub Color/Line Status Pink;Capped 5/12/2018  7:30 PM   Alcohol Cap Used Yes 5/12/2018  7:30 PM                      Urinary Catheter      Intake & Output   Date 05/12/18 0700 - 05/13/18 0659 05/13/18 0700 - 05/14/18 0659   Shift 0700-1859 1900-0659 24 Hour Total 0700-1859 1900-0659 24 Hour Total   I  N  T  A  K  E   P. O. 550  550         P. O. 550  550       Shift Total  (mL/kg) 550  (6.9)  550  (6.9)      O  U  T  P  U  T   Urine  (mL/kg/hr) 650  (0.7)  650         Urine Voided 650  650       Shift Total  (mL/kg) 650  (8.1)  650  (8.1)      NET -100  -100      Weight (kg) 79.9 79.9 79.9 79.9 79.9 79.9         Readmission Risk Assessment Tool Score Medium Risk            18       Total Score        3 Has Seen PCP in Last 6 Months (Yes=3, No=0)    2 . Living with Significant Other. Assisted Living. LTAC. SNF. or   Rehab    5 Pt.  Coverage (Medicare=5 , Medicaid, or Self-Pay=4)    8 Charlson Comorbidity Score (Age + Comorbid Conditions)        Criteria that do not apply:    Patient Length of Stay (>5 days = 3)    IP Visits Last 12 Months (1-3=4, 4=9, >4=11)       Expected Length of Stay 3d 16h   Actual Length of Stay 3

## 2018-05-13 NOTE — PROGRESS NOTES
Problem: Falls - Risk of  Goal: *Absence of Falls  Document Nayely Fall Risk and appropriate interventions in the flowsheet.    Outcome: Progressing Towards Goal  Fall Risk Interventions:            Medication Interventions: Assess postural VS orthostatic hypotension    Elimination Interventions: Call light in reach    History of Falls Interventions: Bed/chair exit alarm

## 2018-05-13 NOTE — PROGRESS NOTES
ADULT PROTOCOL: JET AEROSOL  REASSESSMENT    Patient  Jacqui Bumpers     68 y.o.   female     5/13/2018  5:30 AM    Breath Sounds Pre Procedure: Right Breath Sounds: Scattered wheezing                               Left Breath Sounds: Scattered wheezing    Breath Sounds Post Procedure: Right Breath Sounds: Scattered wheezing                                 Left Breath Sounds: Scattered wheezing    Breathing pattern: Pre procedure Breathing Pattern: Regular          Post procedure Breathing Pattern: Regular    Heart Rate: Pre procedure Pulse: 82           Post procedure Pulse: 95    Resp Rate: Pre procedure Respirations: 18           Post procedure Respirations 18          Cough: Pre procedure Cough: Non-productive               Post procedure Cough: Non-productive, Dry    Suctioned: NO    Sputum: Pre procedure  NA                 Post procedure  NA    Oxygen: O2 Device: Nasal cannula   2L     Changed: NO    SpO2: Pre procedure SpO2: 97 %   with oxygen              Post procedure SpO2: 98 %  with oxygen    Nebulizer Therapy: Current medications Aerosolized Medications: Ipratropium bromide, Xopenex      Changed: NO    Smoking History: never smoked    Problem List:   Patient Active Problem List   Diagnosis Code    Type II or unspecified type diabetes mellitus without mention of complication, uncontrolled E11.65    Other and unspecified hyperlipidemia E78.5    Unspecified essential hypertension I10    [facility] J96.00    Unspecified asthma, with exacerbation J45. 0    NSTEMI (non-ST elevated myocardial infarction) [facility] I21.4    CAD (coronary artery disease), native coronary artery I25.10    Hyperlipidemia LDL goal < 70 E78.5    Respiratory failure ([facility] J96.90       Respiratory Therapist: Katerin Morejon, RT

## 2018-05-13 NOTE — PROGRESS NOTES
PCU SHIFT NURSING NOTE      Bedside and Verbal shift change report given to Daniel Mckeon RN (oncoming nurse) by Heriberto James RN (offgoing nurse). Report included the following information SBAR, Kardex, ED Summary, Procedure Summary, Intake/Output, MAR, Recent Results, Med Rec Status, Cardiac Rhythm NSR and Alarm Parameters . Shift Summary:         Admission Date 5/10/2018   Admission Diagnosis Respiratory failure ([address]   Consults IP CONSULT TO PULMONOLOGY        Consults   []PT   []OT   []Speech   [x]Case Management      [] Palliative      Cardiac Monitoring Order   [x]Yes   []No     IV drips   []Yes    Drip:                            Dose:  Drip:                            Dose:  Drip:                            Dose:   [x]No     GI Prophylaxis   [x]Yes   []No         DVT Prophylaxis   SCDs:             Bubba stockings:         [x] Medication   []Contraindicated   []None      Activity Level Activity Level: Up ad xavi     Activity Assistance: No assistance needed   Purposeful Rounding every 1-2 hour? [x]Yes   Mayers Score  Total Score: 1   Bed Alarm (If score 3 or >)   []Yes   [] Refused (See signed refusal form in chart)   Cody Score  Cody Score: 22   Cody Score (if score 14 or less)   []PMT consult   []Wound Care consult      []Specialty bed   [] Nutrition consult          Needs prior to discharge:   Home O2 required:    []Yes   [x]No    If yes, how much O2 required?     Other:    Last Bowel Movement: Last Bowel Movement Date: 05/11/18      Influenza Vaccine Received Flu Vaccine for Current Season (usually Sept-March): Yes        Pneumonia Vaccine           Diet Active Orders   Diet    DIET DIABETIC CONSISTENT CARB Regular      LDAs               Peripheral IV 05/10/18 Right Hand (Active)   Site Assessment Clean, dry, & intact 5/13/2018 11:01 AM   Phlebitis Assessment 0 5/13/2018 11:01 AM   Infiltration Assessment 0 5/13/2018 11:01 AM   Dressing Status Clean, dry, & intact 5/13/2018 11:01 AM Dressing Type Tape;Transparent 5/13/2018 11:01 AM   Hub Color/Line Status Pink;Capped;Flushed 5/13/2018 11:01 AM   Action Taken Open ports on tubing capped 5/13/2018 11:01 AM   Alcohol Cap Used Yes 5/13/2018 11:01 AM                      Urinary Catheter      Intake & Output   Date 05/12/18 0700 - 05/13/18 0659 05/13/18 0700 - 05/14/18 0659   Shift 0700-1859 1900-0659 24 Hour Total 0700-1859 1900-0659 24 Hour Total   I  N  T  A  K  E   P. O. 550  550         P. O. 550  550       Shift Total  (mL/kg) 550  (6.9)  550  (6.9)      O  U  T  P  U  T   Urine  (mL/kg/hr) 650  (0.7)  650  (0.3)         Urine Voided 650  650         Urine Occurrence(s)    1 x  1 x    Shift Total  (mL/kg) 650  (8.1)  650  (8.1)      NET -100  -100      Weight (kg) 79.9 79.9 79.9 79.9 79.9 79.9         Readmission Risk Assessment Tool Score Medium Risk            18       Total Score        3 Has Seen PCP in Last 6 Months (Yes=3, No=0)    2 . Living with Significant Other. Assisted Living. LTAC. SNF. or   Rehab    5 Pt.  Coverage (Medicare=5 , Medicaid, or Self-Pay=4)    8 Charlson Comorbidity Score (Age + Comorbid Conditions)        Criteria that do not apply:    Patient Length of Stay (>5 days = 3)    IP Visits Last 12 Months (1-3=4, 4=9, >4=11)       Expected Length of Stay 3d 16h   Actual Length of Stay 3

## 2018-05-14 LAB
GLUCOSE BLD STRIP.AUTO-MCNC: 114 MG/DL (ref 65–100)
GLUCOSE BLD STRIP.AUTO-MCNC: 139 MG/DL (ref 65–100)
GLUCOSE BLD STRIP.AUTO-MCNC: 192 MG/DL (ref 65–100)
GLUCOSE BLD STRIP.AUTO-MCNC: 373 MG/DL (ref 65–100)
SERVICE CMNT-IMP: ABNORMAL

## 2018-05-14 PROCEDURE — 74011250636 HC RX REV CODE- 250/636: Performed by: INTERNAL MEDICINE

## 2018-05-14 PROCEDURE — 74011250637 HC RX REV CODE- 250/637: Performed by: INTERNAL MEDICINE

## 2018-05-14 PROCEDURE — 74011000250 HC RX REV CODE- 250: Performed by: INTERNAL MEDICINE

## 2018-05-14 PROCEDURE — 74011636637 HC RX REV CODE- 636/637: Performed by: INTERNAL MEDICINE

## 2018-05-14 PROCEDURE — 82962 GLUCOSE BLOOD TEST: CPT

## 2018-05-14 PROCEDURE — 65270000015 HC RM PRIVATE ONCOLOGY

## 2018-05-14 PROCEDURE — 94640 AIRWAY INHALATION TREATMENT: CPT

## 2018-05-14 RX ORDER — GUAIFENESIN/DEXTROMETHORPHAN 100-10MG/5
5 SYRUP ORAL 4 TIMES DAILY
Status: DISCONTINUED | OUTPATIENT
Start: 2018-05-14 | End: 2018-05-15 | Stop reason: HOSPADM

## 2018-05-14 RX ORDER — INSULIN LISPRO 100 [IU]/ML
12 INJECTION, SOLUTION INTRAVENOUS; SUBCUTANEOUS ONCE
Status: COMPLETED | OUTPATIENT
Start: 2018-05-14 | End: 2018-05-14

## 2018-05-14 RX ORDER — ALBUTEROL SULFATE 0.83 MG/ML
2.5 SOLUTION RESPIRATORY (INHALATION)
Status: DISCONTINUED | OUTPATIENT
Start: 2018-05-14 | End: 2018-05-15 | Stop reason: HOSPADM

## 2018-05-14 RX ADMIN — ALBUTEROL SULFATE 2.5 MG: 2.5 SOLUTION RESPIRATORY (INHALATION) at 13:54

## 2018-05-14 RX ADMIN — UMECLIDINIUM 1 PUFF: 62.5 AEROSOL, POWDER ORAL at 11:10

## 2018-05-14 RX ADMIN — SERTRALINE HYDROCHLORIDE 150 MG: 50 TABLET ORAL at 09:43

## 2018-05-14 RX ADMIN — ENOXAPARIN SODIUM 40 MG: 40 INJECTION SUBCUTANEOUS at 08:25

## 2018-05-14 RX ADMIN — LISINOPRIL 5 MG: 5 TABLET ORAL at 09:43

## 2018-05-14 RX ADMIN — ALBUTEROL SULFATE 2.5 MG: 2.5 SOLUTION RESPIRATORY (INHALATION) at 20:35

## 2018-05-14 RX ADMIN — GUAIFENESIN AND DEXTROMETHORPHAN 5 ML: 100; 10 SYRUP ORAL at 09:58

## 2018-05-14 RX ADMIN — GUAIFENESIN AND DEXTROMETHORPHAN 5 ML: 100; 10 SYRUP ORAL at 14:22

## 2018-05-14 RX ADMIN — GLIMEPIRIDE 2 MG: 1 TABLET ORAL at 07:25

## 2018-05-14 RX ADMIN — Medication 10 ML: at 22:14

## 2018-05-14 RX ADMIN — ATORVASTATIN CALCIUM 20 MG: 20 TABLET, FILM COATED ORAL at 09:45

## 2018-05-14 RX ADMIN — LEVOFLOXACIN 500 MG: 500 TABLET, FILM COATED ORAL at 09:43

## 2018-05-14 RX ADMIN — Medication 10 ML: at 06:46

## 2018-05-14 RX ADMIN — MUPIROCIN: 20 OINTMENT TOPICAL at 10:00

## 2018-05-14 RX ADMIN — INSULIN LISPRO 12 UNITS: 100 INJECTION, SOLUTION INTRAVENOUS; SUBCUTANEOUS at 17:14

## 2018-05-14 RX ADMIN — METOPROLOL TARTRATE 5 MG: 5 INJECTION, SOLUTION INTRAVENOUS at 03:01

## 2018-05-14 RX ADMIN — ALBUTEROL SULFATE 2.5 MG: 2.5 SOLUTION RESPIRATORY (INHALATION) at 00:12

## 2018-05-14 RX ADMIN — FLUTICASONE FUROATE AND VILANTEROL TRIFENATATE 1 PUFF: 200; 25 POWDER RESPIRATORY (INHALATION) at 09:30

## 2018-05-14 RX ADMIN — INSULIN HUMAN 10 UNITS: 100 INJECTION, SUSPENSION SUBCUTANEOUS at 22:11

## 2018-05-14 RX ADMIN — INSULIN HUMAN 10 UNITS: 100 INJECTION, SUSPENSION SUBCUTANEOUS at 09:48

## 2018-05-14 RX ADMIN — GUAIFENESIN AND DEXTROMETHORPHAN 5 ML: 100; 10 SYRUP ORAL at 22:05

## 2018-05-14 RX ADMIN — BUPROPION HYDROCHLORIDE 300 MG: 150 TABLET, FILM COATED, EXTENDED RELEASE ORAL at 06:46

## 2018-05-14 RX ADMIN — PIOGLITAZONE 15 MG: 15 TABLET ORAL at 06:46

## 2018-05-14 RX ADMIN — METHYLPREDNISOLONE SODIUM SUCCINATE 40 MG: 40 INJECTION, POWDER, FOR SOLUTION INTRAMUSCULAR; INTRAVENOUS at 09:59

## 2018-05-14 RX ADMIN — GUAIFENESIN AND DEXTROMETHORPHAN 5 ML: 100; 10 SYRUP ORAL at 17:15

## 2018-05-14 RX ADMIN — Medication 10 ML: at 14:22

## 2018-05-14 RX ADMIN — ASPIRIN 81 MG: 81 TABLET, COATED ORAL at 09:45

## 2018-05-14 RX ADMIN — OXYBUTYNIN CHLORIDE 10 MG: 5 TABLET, FILM COATED, EXTENDED RELEASE ORAL at 09:44

## 2018-05-14 RX ADMIN — Medication 10 ML: at 10:01

## 2018-05-14 NOTE — PROGRESS NOTES
Oncology Nursing Communication Tool  7:13 PM  5/14/2018     Bedside and Verbal shift change report given to Matt Sanchez RN (incoming nurse) by Arpit Mujica (outgoing nurse) on Gabino Wilcox. Report included the following information SBAR, Kardex, Procedure Summary, Intake/Output, MAR, Accordion and Recent Results. Shift Summary:       Issues for physician to address: Oncology Shift Note   Admission Date 5/10/2018   Admission Diagnosis Respiratory failure ([address]   Code Status Full Code   Consults IP CONSULT TO PULMONOLOGY      Cardiac Monitoring [] Yes [] No      Purposeful Hourly Rounding [] Yes    Nayely Score Total Score: 1   Nayely score 3 or > [] Bed Alarm [] [employer] [] 1:1 sitter [] Patient refused (Place signed refusal form in chart)      Pain Managed [] Yes [] No    Key Pain Meds     The patient is on no pain meds. Influenza Vaccine Received Flu Vaccine for Current Season (usually Sept-March): Yes           Oxygen needs? [] Room air Oxygen @  []1L    []2L    []3L   []4L    []5L   []6L     Use home O2? [] Yes [] No  Perform O2 challenge test using  smartphrase (.oxygenchallenge)      Last bowel movement Last Bowel Movement Date: 05/11/18  bowel movement      Urinary Catheter             LDAs               Peripheral IV 05/14/18 Left Hand (Active)   Site Assessment Clean, dry, & intact 5/14/2018  4:00 PM   Phlebitis Assessment 0 5/14/2018  4:00 PM   Infiltration Assessment 0 5/14/2018  4:00 PM   Dressing Status Clean, dry, & intact 5/14/2018  4:00 PM   Dressing Type Transparent;Tape 5/14/2018  4:00 PM   Hub Color/Line Status Pink;Patent 5/14/2018  4:00 PM   Action Taken Open ports on tubing capped 5/14/2018  2:50 PM   Alcohol Cap Used Yes 5/14/2018  2:50 PM                         Readmission Risk Assessment Tool Score Medium Risk            18       Total Score        3 Has Seen PCP in Last 6 Months (Yes=3, No=0)    2 . Living with Significant Other. Assisted Living. LTAC. SNF. or   Rehab    5 Pt.  Coverage (Medicare=5 , Medicaid, or Self-Pay=4)    8 Charlson Comorbidity Score (Age + Comorbid Conditions)        Criteria that do not apply:    Patient Length of Stay (>5 days = 3)    IP Visits Last 12 Months (1-3=4, 4=9, >4=11)       Expected Length of Stay 3d 16h   Actual Length of Stay [address] [place] [address]

## 2018-05-14 NOTE — PROGRESS NOTES
ADULT PROTOCOL: JET AEROSOL ASSESSMENT    Patient  America Pelaez     68 y.o.   female     5/14/2018  6:01 PM    Breath Sounds Pre Procedure: Right Breath Sounds: Scattered wheezing                               Left Breath Sounds: Scattered wheezing    Breath Sounds Post Procedure: Right Breath Sounds: Scattered wheezing                                 Left Breath Sounds: Scattered wheezing    Breathing pattern: Pre procedure Breathing Pattern: Regular          Post procedure Breathing Pattern: Regular    Heart Rate: Pre procedure Pulse: 73           Post procedure Pulse: 88    Resp Rate: Pre procedure Respirations: 20           Post procedure Respirations: 20      Cough: Pre procedure Cough: Non-productive               Post procedure Cough: Non-productive    Sputum: Pre procedure                   Post procedure      Oxygen: O2 Device: Room air        Changed: NO    SpO2: Pre procedure SpO2: 96 %   without oxygen              Post procedure SpO2: 95 %  without oxygen    Nebulizer Therapy: Current medications Aerosolized Medications: Albuterol      Changed: NO    Smoking History: never smoker    Problem List:   Patient Active Problem List   Diagnosis Code    Type II or unspecified type diabetes mellitus without mention of complication, uncontrolled E11.65    Other and unspecified hyperlipidemia E78.5    Unspecified essential hypertension I10    [facility] J96.00    Unspecified asthma, with exacerbation J45. [address]    NSTEMI (non-ST elevated myocardial infarction) ([facility] I21.4    CAD (coronary artery disease), native coronary artery I25.10    Hyperlipidemia LDL goal < 70 E78.5    Respiratory failure ([facility] J96.90       Respiratory Therapist: RT Ekaterina

## 2018-05-14 NOTE — PROGRESS NOTES
PCU SHIFT NURSING NOTE      Bedside and Verbal shift change report given to Tuyet Liang RN (oncoming nurse) by Abigail Lloyd RN (offgoing nurse). Report included the following information SBAR, Kardex, MAR and Recent Results. Shift Summary:   0710: Bedside shift change report given to Abigail Lloyd RN (oncoming nurse) by Tuyet Liang RN (offgoing nurse). Report included the following information SBAR, Kardex, MAR and Recent Results. Admission Date 5/10/2018   Admission Diagnosis Res[facility] failure ([facility]   Consults IP CONSULT TO PULMONOLOGY        Consults   [x]PT   [x]OT   []Speech   []Case Management      [] Palliative      Cardiac Monitoring Order   [x]Yes   []No     IV drips   []Yes    Drip:                            Dose:  Drip:                            Dose:  Drip:                            Dose:   [x]No     GI Prophylaxis   []Yes   [x]No         DVT Prophylaxis   SCDs:             Bubba stockings:         [x] Medication   []Contraindicated   []None      Activity Level Activity Level: Up ad xavi     Activity Assistance: No assistance needed   Purposeful Rounding every 1-2 hour? [x]Yes   Mayers Score  Total Score: 1   Bed Alarm (If score 3 or >)   []Yes   [] Refused (See signed refusal form in chart)   Cody Score  Cody Score: 22   Cody Score (if score 14 or less)   []PMT consult   []Wound Care consult      []Specialty bed   [] Nutrition consult          Needs prior to discharge:   Home O2 required:    []Yes   [x]No    If yes, how much O2 required?     Other:    Last Bowel Movement: Last Bowel Movement Date: 05/11/18      Influenza Vaccine Received Flu Vaccine for Current Season (usually Sept-March): Yes        Pneumonia Vaccine           Diet Active Orders   Diet    DIET DIABETIC CONSISTENT CARB Regular      LDAs               Peripheral IV 05/10/18 Right Hand (Active)   Site Assessment Clean, dry, & intact 5/13/2018  8:15 PM   Phlebitis Assessment 0 5/13/2018  8:15 PM   Infiltration Assessment 0 5/13/2018  8:15 PM Dressing Status Clean, dry, & intact 5/13/2018  8:15 PM   Dressing Type Tape;Transparent 5/13/2018  8:15 PM   Hub Color/Line Status Pink;Capped 5/13/2018  8:15 PM   Action Taken Open ports on tubing capped 5/13/2018  6:36 PM   Alcohol Cap Used Yes 5/13/2018  6:36 PM                      Urinary Catheter      Intake & Output   Date 05/13/18 0700 - 05/14/18 0659 05/14/18 0700 - 05/15/18 0659   Shift 0700-1859 1900-0659 24 Hour Total 0700-1859 1900-0659 24 Hour Total   I  N  T  A  K  E   Shift Total  (mL/kg)         O  U  T  P  U  T   Urine  (mL/kg/hr)            Urine Occurrence(s) 5 x  5 x       Shift Total  (mL/kg)         NET         Weight (kg) 79.9 79.9 79.9 79.9 79.9 79.9         Readmission Risk Assessment Tool Score Medium Risk            18       Total Score        3 Has Seen PCP in Last 6 Months (Yes=3, No=0)    2 . Living with Significant Other. Assisted Living. LTAC. SNF. or   Rehab    5 Pt.  Coverage (Medicare=5 , Medicaid, or Self-Pay=4)    8 Charlson Comorbidity Score (Age + Comorbid Conditions)        Criteria that do not apply:    Patient Length of Stay (>5 days = 3)    IP Visits Last 12 Months (1-3=4, 4=9, >4=11)       Expected Length of Stay 3d 16h   Actual Length of Stay 4

## 2018-05-14 NOTE — PROGRESS NOTES
PCU SHIFT NURSING NOTE      Bedside and Verbal shift change report given to Gilles Adame RN (oncoming nurse) by Estelle Cosme RN (offgoing nurse). Report included the following information SBAR, Kardex, ED Summary, Procedure Summary, Intake/Output, MAR, Recent Results, Med Rec Status, Cardiac Rhythm NSR and Alarm Parameters . Shift Summary:   1445--TRANSFER - OUT REPORT:    Verbal report given to Oncology Nurse(name) on Gabino Wilcox  being transferred to Oncology(unit) for routine progression of care       Report consisted of patients Situation, Background, Assessment and   Recommendations(SBAR). Information from the following report(s) SBAR, Kardex, ED Summary, Procedure Summary, Intake/Output, MAR, Recent Results, Med Rec Status, Cardiac Rhythm NSR and Alarm Parameters  was reviewed with the receiving nurse. Lines:   Peripheral IV 05/14/18 Left Hand (Active)   Site Assessment Clean, dry, & intact 5/14/2018  2:50 PM   Phlebitis Assessment 0 5/14/2018  2:50 PM   Infiltration Assessment 0 5/14/2018  2:50 PM   Dressing Status Clean, dry, & intact 5/14/2018  2:50 PM   Dressing Type Tape;Transparent 5/14/2018  2:50 PM   Hub Color/Line Status Pink;Capped;Flushed 5/14/2018  2:50 PM   Action Taken Open ports on tubing capped 5/14/2018  2:50 PM   Alcohol Cap Used Yes 5/14/2018  2:50 PM        Opportunity for questions and clarification was provided. Patient transported with:   Registered Nurse            1450--Patient transferred to oncology to room 1142.           Admission Date 5/10/2018   Admission Diagnosis Respiratory failure ([facility] [place] [address]   Consults IP CONSULT TO PULMONOLOGY        Consults   []PT   []OT   []Speech   [x]Case Management      [] Palliative      Cardiac Monitoring Order   [x]Yes   []No     IV drips   []Yes    Drip:                            Dose:  Drip:                            Dose:  Drip:                            Dose:   [x]No     GI Prophylaxis   [x]Yes   []No         DVT Prophylaxis SCDs:             Bubba stockings:         [x] Medication   []Contraindicated   []None      Activity Level Activity Level: Up ad xavi     Activity Assistance: No assistance needed   Purposeful Rounding every 1-2 hour? [x]Yes   Mayers Score  Total Score: 1   Bed Alarm (If score 3 or >)   []Yes   [] Refused (See signed refusal form in chart)   Cody Score  Cody Score: 22   Cody Score (if score 14 or less)   []PMT consult   []Wound Care consult      []Specialty bed   [x] Nutrition consult          Needs prior to discharge:   Home O2 required:    []Yes   [x]No    If yes, how much O2 required? Other:    Last Bowel Movement: Last Bowel Movement Date: 05/11/18      Influenza Vaccine Received Flu Vaccine for Current Season (usually Sept-March): Yes        Pneumonia Vaccine           Diet Active Orders   Diet    DIET DIABETIC CONSISTENT CARB Regular      LDAs               Peripheral IV 05/14/18 Left Hand (Active)   Site Assessment Clean, dry, & intact 5/14/2018  7:25 AM   Phlebitis Assessment 0 5/14/2018  7:25 AM   Infiltration Assessment 0 5/14/2018  7:25 AM   Dressing Status Clean, dry, & intact 5/14/2018  7:25 AM   Dressing Type Tape;Transparent 5/14/2018  7:25 AM   Hub Color/Line Status Pink;Capped;Flushed 5/14/2018  7:25 AM                      Urinary Catheter      Intake & Output   Date 05/13/18 0700 - 05/14/18 0659 05/14/18 0700 - 05/15/18 0659   Shift 6551-8385 8331-1712 24 Hour Total 0700-1859 8236-8240 24 Hour Total   I  N  T  A  K  E   Shift Total  (mL/kg)         O  U  T  P  U  T   Urine  (mL/kg/hr)            Urine Occurrence(s) 5 x  5 x 1 x  1 x    Shift Total  (mL/kg)         NET         Weight (kg) 79.9 79.9 79.9 79.7 79.7 79.7         Readmission Risk Assessment Tool Score Medium Risk            18       Total Score        3 Has Seen PCP in Last 6 Months (Yes=3, No=0)    2 . Living with Significant Other. Assisted Living. LTAC. SNF. or   Rehab    5 Pt.  Coverage (Medicare=5 , Medicaid, or Self-Pay=4)    8 Charlson Comorbidity Score (Age + Comorbid Conditions)        Criteria that do not apply:    Patient Length of Stay (>5 days = 3)    IP Visits Last 12 Months (1-3=4, 4=9, >4=11)       Expected Length of Stay 3d 16h   Actual Length of Stay 4

## 2018-05-14 NOTE — PROGRESS NOTES
Hospitalist Progress Note    NAME: Stew Rivera   :  1945   MRN:  [id number]       Assessment / Plan:  Acute hypoxic/hypercarbic respiratory failure due to severe persistent asthma with acute exacerbation:  rapid response  for respiratory distress/increased WOB  - CXR  with no evidence of acute cardiopulmonary process  - able to wean off O2 on   - pulmonology consult appreciated, needs office follow up next week. I have placed CM consult to assist with appt. - will extend emperic levaquin given severity of sx  - con't solumedrol for 1 more day due to severe cough with diffuse expiratory wheeze  - con't albuterol nebs with INH steroid (on advair at home). Per pulmonology, may additionally consider addition of mepolizumab as outpatient - eos 400 on admission  Non insulin dependent DM2 uncontrolled with steroid-induced hyperglycemia:  - con't NPH with solumedrol  - con't amaryl and actos (home meds)  - lispro sliding scale  Benign HTN and CAD:  cardiac cath in , nonobstructive  - con't lisinopril, ASA, lipitor  - hydralazine prn  Anxiety with depression: con't home prn bedtime xanax, sertraline, wellbutrin       Code Status: full  Surrogate Decision Maker:  or daughter Emily Franciscober home [phone], cell [phone]  DVT Prophylaxis: lovenox     Subjective:     Chief Complaint / Reason for Physician Visit  \"I am coughing more today, it makes me nervous\". Discussed with RN events overnight. Review of Systems:  Symptom Y/N Comments  Symptom Y/N Comments   Fever/Chills n   Chest Pain n    Poor Appetite n   Edema n    Cough y   Abdominal Pain n    Sputum n   Joint Pain     SOB/CURTIS y   Pruritis/Rash     Nausea/vomit    Tolerating PT/OT     Diarrhea    Tolerating Diet     Constipation    Other       Could NOT obtain due to:      Objective:     VITALS:   Last 24hrs VS reviewed since prior progress note.  Most recent are:  Patient Vitals for the past 24 hrs:   Temp Pulse Resp BP SpO2 05/14/18 1501 98.3 °F (36.8 °C) 79 20 132/52 97 %   05/14/18 1354 - - - - 96 %   05/14/18 1205 97.8 °F (36.6 °C) 74 20 118/62 95 %   05/14/18 0800 - 63 - - -   05/14/18 0725 97.7 °F (36.5 °C) 73 20 115/89 96 %   05/14/18 0301 - 71 - 138/69 -   05/14/18 0259 97.9 °F (36.6 °C) 69 20 138/69 98 %   05/14/18 0010 - - - - 94 %   05/13/18 2252 97.7 °F (36.5 °C) 84 20 126/72 93 %   05/13/18 1959 98.7 °F (37.1 °C) 98 20 126/69 93 %   05/13/18 1627 98.3 °F (36.8 °C) 76 20 116/66 94 %     No intake or output data in the 24 hours ending 05/14/18 1523     PHYSICAL EXAM:  General: WD, WN. Alert, cooperative, no acute distress    EENT:  EOMI. Anicteric sclerae. MMM  Resp:  Diffuse expiratory wheezing without course cough. No accessory muscle use  CV:  Regular rhythm,  No edema  GI:  Soft, Non distended, Non tender.  +Bowel sounds  Neurologic:  Alert and oriented X 3, normal speech,   Psych:   Good insight. Not anxious nor agitated  Skin:  No rashes. No jaundice    Reviewed most current lab test results and cultures  YES  Reviewed most current radiology test results   YES  Review and summation of old records today    NO  Reviewed patient's current orders and MAR    YES  PMH/SH reviewed - no change compared to H&P  ________________________________________________________________________  Care Plan discussed with:    Comments   Patient x    Family      RN x    Care Manager     Consultant  x pulmonology                     Multidiciplinary team rounds were held today with , nursing, pharmacist and clinical coordinator. Patient's plan of care was discussed; medications were reviewed and discharge planning was addressed.      ________________________________________________________________________  Total NON critical care TIME:  25 Minutes    Total CRITICAL CARE TIME Spent:   Minutes non procedure based      Comments   >50% of visit spent in counseling and coordination of care x ________________________________________________________________________  Claudia Montes De Oca MD     Procedures: see electronic medical records for all procedures/Xrays and details which were not copied into this note but were reviewed prior to creation of Plan. LABS:  I reviewed today's most current labs and imaging studies.   Pertinent labs include:  Recent Labs      05/12/18   0457   WBC  16.7*   HGB  13.2   HCT  40.6   PLT  178     Recent Labs      05/12/18 0457   NA  138   K  4.1   CL  105   CO2  26   GLU  184*   BUN  27*   CREA  0.75   CA  9.2   MG  2.2   PHOS  3.5   ALB  3.8   TBILI  0.8   SGOT  41*   ALT  34       Signed: Claudia Montes De Oca MD

## 2018-05-14 NOTE — PROGRESS NOTES
Problem: Falls - Risk of  Goal: *Absence of Falls  Document Nayely Fall Risk and appropriate interventions in the flowsheet.    Outcome: Progressing Towards Goal  Fall Risk Interventions:            Medication Interventions: Patient to call before getting OOB    Elimination Interventions: Bed/chair exit alarm    History of Falls Interventions: Evaluate medications/consider consulting pharmacy

## 2018-05-14 NOTE — PROGRESS NOTES
CM acknowledged consult for Pulmonology f/u appointment in one week. Appointment scheduled with Dr. Alejandra Paez NP and placed on AVS. Pt transfer from PCU to Oncology - handoff sent to CM. Assessment to follow.      ASHLEE Torres Supervisee in Social Work, [employer]  [phone]

## 2018-05-14 NOTE — PROGRESS NOTES
[facility]  Pulmonary, Critical Care, and Sleep Medicine    Name: Esdras Portillo MRN: [id number]   : 1945 Hospital: [facility]   Date: 2018        IMPRESSION:   · Severe persistent asthma with acute exacerbation. , baseline FEV1 is 1L, 45% pred. · Chronic cough. · Allergic Rhinitis, had negative skin and RAST testing in past.   · Hypertension  · Hyperglycemia, made worse by steroids. · Non smoker      RECOMMENDATIONS:   · On room air   · On Breo, nebs - add LAMA  · Continue IV steroids another day  · Glycemic monitoring and control  · Has tussionex prn. · Would consider addition of mepolizumab as outpatient - eos 400 on admission  · On enoxaparin for DVT prophylaxis. Subjective/History:     : still with a lot of wheezing, but she has not required BiPAP at all   feels better today. Off BiPAP  Still diffuse wheezing       This patient has been seen and evaluated at the request of Dr. Agnes Trevino for above. Patient is a 68 y.o. female Pt has been seen by Dr. eGnoveva Franks about 3-4 weeks ago in office. She noted to be getting worse and had increased dyspnea for the last 2 weeks. She got acutely worse and presented to the ER yesterday. She had taken 4 neb  Treatment prior to arrival without any improvement. Had increased cough mainly was clear and then developed yellow tinge and was thicker. She usually has some dyspnea on exertion but on day of admission noted that the dyspnea was occurring at rest as well. Had associated symptom of chest tightness as well. She has been compliant with her advair and albuterol prn. NO recent steroid or Abx treatments. No acute fevers, chills, sweats, leg swelling or palpitations. Who presented yesterday with increased breathing difficulty. She has progressed to have increased difficulty breathing. Increased wheezing. She get about 1 hr of relief with the neb treatments.   Pt did not some improvement when placed on bipap this am.    She has been tolerating bipap well. Last seen in office by Dr. Theresa Arambula on 4-16-18. Was followed up for asthma. She has 2 exacerbations last year. Has exertional shortness of breath, nonproductive cough. She has worse symptoms by environmental changes. Cold air exposure. Exposure to smoke. Denies fevers, chills, sweats. No GERD. Denies any aspiration. She has been using her albuterol more frequently without any improvement. She had prior skin testing years ago and everything was negative. Pt has still been walking and working on regular basis. She had gained near 30 lbs over the last year. She works in [employer]. She has 2 dogs in her home. NO birds.   No etoh use. Has been on Albuterol nebs at home  Ventolin prn  Advair 500/50, on inh bid. Alprazolam prn      PFTs on 4-16-18 in office:  FVC: 2.3L 78% pred  FEV1: 1L 45% pred  FEV1/FVC: 44%    42%  Bronchodilator response     RV/TLC is 132% pred  DLCO /VA is 114%. Past Medical History:   Diagnosis Date    Asthma     Cervical cancer ([facility] [address] 1981    COPD     Depression with anxiety     Diabetes ([facility] [address]     currently on no meds    Other and unspecified hyperlipidemia     Type II or unspecified type diabetes mellitus without mention of complication, uncontrolled     A1c 9.2 8/2012;  used to see Dr. Marlene Gutierrez essential hypertension       Past Surgical History:   Procedure Laterality Date    CARDIAC CATHETERIZATION  2/21/2013         HX APPENDECTOMY      HX CERVICAL FUSION      HX AURY AND BSO      for cervical cancer      Prior to Admission medications    Medication Sig Start Date End Date Taking? Authorizing Provider   lisinopril (PRINIVIL, ZESTRIL) 10 mg tablet Take 5 mg by mouth daily. Yes Phys Other, MD   buPROPion XL (WELLBUTRIN XL) 300 mg XL tablet Take 300 mg by mouth every morning. Yes Phys Other, MD   glimepiride (AMARYL) 2 mg tablet Take 2 mg by mouth every morning.    Yes Phys MD Michelle   atorvastatin (LIPITOR) 20 mg tablet Take 20 mg by mouth daily. Yes Mendoza Martinez MD   aspirin delayed-release 81 mg tablet Take 81 mg by mouth daily. Yes Historical Provider   pioglitazone (ACTOS) 15 mg tablet Take 15 mg by mouth daily. Yes Historical Provider   dextromethorphan-guaiFENesin (ROBITUSSIN-DM)  mg/5 mL syrup Take 10 mL by mouth every four (4) hours as needed for Cough. Yes Historical Provider   cholecalciferol (VITAMIN D3) 1,000 unit tablet Take 1,000 Units by mouth daily. Yes Historical Provider   cyanocobalamin 1,000 mcg tablet Take 1,000 mcg by mouth daily. Yes Historical Provider   albuterol-ipratropium (DUONEB) 2.5 mg-0.5 mg/3 ml nebulizer solution 3 mL by Nebulization route every four (4) hours as needed for Wheezing. 2/25/13  Yes Malathi Thomas MD   ALPRAZolam Margorie Clunes) 0.5 mg tablet Take 0.5 mg by mouth two (2) times daily as needed. Yes Historical Provider   albuterol (PROVENTIL, VENTOLIN) 90 mcg/actuation inhaler Take 1-2 Puffs by inhalation every four (4) hours as needed for Wheezing. 2/18/13  Yes Ubaldo Andrew MD   fluticasone-salmeterol (ADVAIR DISKUS) 500-50 mcg/dose diskus inhaler Take 1 Puff by inhalation every twelve (12) hours. Yes Historical Provider   sertraline (ZOLOFT) 100 mg tablet Take 150 mg by mouth daily.    Yes Historical Provider     Current Facility-Administered Medications   Medication Dose Route Frequency    guaiFENesin-dextromethorphan (ROBITUSSIN DM) 100-10 mg/5 mL syrup 5 mL  5 mL Oral QID    albuterol (PROVENTIL VENTOLIN) nebulizer solution 2.5 mg  2.5 mg Nebulization Q6H RT    insulin NPH (NOVOLIN N, HUMULIN N) injection 10 Units  10 Units SubCUTAneous Q12H    Or    methylPREDNISolone (PF) (SOLU-MEDROL) injection 40 mg  40 mg IntraVENous Q12H    methylPREDNISolone (PF) (SOLU-MEDROL) injection 40 mg  40 mg IntraVENous Q12H    And    insulin NPH (NOVOLIN N, HUMULIN N) injection 10 Units  10 Units SubCUTAneous Q12H    levoFLOXacin (LEVAQUIN) tablet 500 mg  500 mg Oral Q24H    metoprolol (LOPRESSOR) injection 5 mg  5 mg IntraVENous Q6H    oxybutynin chloride XL (DITROPAN XL) tablet 10 mg  10 mg Oral DAILY    mupirocin (BACTROBAN) 2 % ointment   Both Nostrils Q12H    insulin lispro (HUMALOG) injection   SubCUTAneous AC&HS    sodium chloride (NS) flush 5-10 mL  5-10 mL IntraVENous Q8H    enoxaparin (LOVENOX) injection 40 mg  40 mg SubCUTAneous Q24H    glimepiride (AMARYL) tablet 2 mg  2 mg Oral ACB    lisinopril (PRINIVIL, ZESTRIL) tablet 5 mg  5 mg Oral DAILY    atorvastatin (LIPITOR) tablet 20 mg  20 mg Oral DAILY    buPROPion XL (WELLBUTRIN XL) tablet 300 mg  300 mg Oral 7am    fluticasone-vilanterol (BREO ELLIPTA) 200mcg-25mcg/puff  1 Puff Inhalation DAILY    aspirin delayed-release tablet 81 mg  81 mg Oral DAILY    sertraline (ZOLOFT) tablet 150 mg  150 mg Oral DAILY    pioglitazone (ACTOS) tablet 15 mg  15 mg Oral 7am     Allergies   Allergen Reactions    Metformin Nausea and Vomiting      Social History   Substance Use Topics    Smoking status: Never Smoker    Smokeless tobacco: Never Used      Comment: exposed to second hand smoking in past    Alcohol use No      Comment: rarely a mixed drink      Family History   Problem Relation Age of Onset    Heart Disease Neg Hx     Stroke Neg Hx         Review of Systems:  Constitutional: positive for fatigue and malaise  Eyes: negative  Ears, nose, mouth, throat, and face: positive for nasal congestion  Respiratory: positive for cough, asthma, wheezing, dyspnea on exertion or chronic bronchitis  Cardiovascular: positive for dyspnea, fatigue, paroxysmal nocturnal dyspnea, tachypnea, dyspnea on exertion  Gastrointestinal: negative  Genitourinary:negative  Integument/breast: negative  Hematologic/lymphatic: negative  Musculoskeletal:negative  Neurological: negative  Behavioral/Psych: negative  Endocrine: negative  Allergic/Immunologic: negative    Objective:   Vital Signs: Visit Vitals    /89 (BP 1 Location: Right arm, BP Patient Position: At rest)    Pulse 73    Temp 97.7 °F (36.5 °C)    Resp 20    Ht 5' 5\" (1.651 m)    Wt 79.7 kg (175 lb 9.6 oz)    SpO2 96%    BMI 29.22 kg/m2       O2 Device: Room air   O2 Flow Rate (L/min): 1 l/min   Temp (24hrs), Av °F (36.7 °C), Min:97.7 °F (36.5 °C), Max:98.7 °F (37.1 °C)       Intake/Output:   Last shift:         Last 3 shifts:    No intake or output data in the 24 hours ending 18 0934    Physical Exam:    General:  Alert, cooperative, no distress, appears stated age. Head:  Normocephalic, without obvious abnormality, atraumatic. Eyes:  Conjunctivae/corneas clear. Nose: Nares normal. Septum midline. Mucosa normal.     Throat: Lips, mucosa, and tongue normal.     Neck: Supple, symmetrical, trachea midline    Back:   Symmetric, no curvature. ROM normal.   Lungs:   Diffuse bilateral wheeze   Chest wall:  No tenderness or deformity. Heart:  Regular rate and rhythm    Abdomen:   Soft, non-tender. Bowel sounds normal.     Extremities: Extremities normal, atraumatic, no cyanosis or edema. Skin: Skin color, texture, turgor normal. No rashes or lesions   Neurologic: Grossly nonfocal        Data:   Labs:  Recent Labs      18   0457   WBC  16.7*   HGB  13.2   HCT  40.6   PLT  178     Recent Labs      18   0457   NA  138   K  4.1   CL  105   CO2  26   GLU  184*   BUN  27*   CREA  0.75   CA  9.2   MG  2.2   PHOS  3.5   ALB  3.8   TBILI  0.8   SGOT  41*   ALT  34     No results for input(s): PH, PCO2, PO2, HCO3, FIO2 in the last 72 hours.     Imaging:  I have personally reviewed the patients radiographs:  None today        Boo Lang MD

## 2018-05-15 VITALS
OXYGEN SATURATION: 95 % | HEIGHT: 65 IN | DIASTOLIC BLOOD PRESSURE: 65 MMHG | TEMPERATURE: 98 F | HEART RATE: 76 BPM | WEIGHT: 175.6 LBS | RESPIRATION RATE: 18 BRPM | BODY MASS INDEX: 29.26 KG/M2 | SYSTOLIC BLOOD PRESSURE: 139 MMHG

## 2018-05-15 LAB
ANION GAP SERPL CALC-SCNC: 7 MMOL/L (ref 5–15)
BUN SERPL-MCNC: 25 MG/DL (ref 6–20)
BUN/CREAT SERPL: 26 (ref 12–20)
CALCIUM SERPL-MCNC: 8.8 MG/DL (ref 8.5–10.1)
CHLORIDE SERPL-SCNC: 105 MMOL/L (ref 97–108)
CO2 SERPL-SCNC: 28 MMOL/L (ref 21–32)
CREAT SERPL-MCNC: 0.95 MG/DL (ref 0.55–1.02)
ERYTHROCYTE [DISTWIDTH] IN BLOOD BY AUTOMATED COUNT: 11.8 % (ref 11.5–14.5)
GLUCOSE BLD STRIP.AUTO-MCNC: 208 MG/DL (ref 65–100)
GLUCOSE BLD STRIP.AUTO-MCNC: 229 MG/DL (ref 65–100)
GLUCOSE SERPL-MCNC: 206 MG/DL (ref 65–100)
HCT VFR BLD AUTO: 40.8 % (ref 35–47)
HGB BLD-MCNC: 13.3 G/DL (ref 11.5–16)
MCH RBC QN AUTO: 30.7 PG (ref 26–34)
MCHC RBC AUTO-ENTMCNC: 32.6 G/DL (ref 30–36.5)
MCV RBC AUTO: 94.2 FL (ref 80–99)
NRBC # BLD: 0 K/UL (ref 0–0.01)
NRBC BLD-RTO: 0 PER 100 WBC
PLATELET # BLD AUTO: 177 K/UL (ref 150–400)
PMV BLD AUTO: 10.8 FL (ref 8.9–12.9)
POTASSIUM SERPL-SCNC: 3.9 MMOL/L (ref 3.5–5.1)
RBC # BLD AUTO: 4.33 M/UL (ref 3.8–5.2)
SERVICE CMNT-IMP: ABNORMAL
SERVICE CMNT-IMP: ABNORMAL
SODIUM SERPL-SCNC: 140 MMOL/L (ref 136–145)
WBC # BLD AUTO: 7.5 K/UL (ref 3.6–11)

## 2018-05-15 PROCEDURE — 94640 AIRWAY INHALATION TREATMENT: CPT

## 2018-05-15 PROCEDURE — 74011250637 HC RX REV CODE- 250/637: Performed by: INTERNAL MEDICINE

## 2018-05-15 PROCEDURE — 36415 COLL VENOUS BLD VENIPUNCTURE: CPT | Performed by: INTERNAL MEDICINE

## 2018-05-15 PROCEDURE — 80048 BASIC METABOLIC PNL TOTAL CA: CPT | Performed by: INTERNAL MEDICINE

## 2018-05-15 PROCEDURE — 74011250636 HC RX REV CODE- 250/636: Performed by: INTERNAL MEDICINE

## 2018-05-15 PROCEDURE — 82962 GLUCOSE BLOOD TEST: CPT

## 2018-05-15 PROCEDURE — 74011000250 HC RX REV CODE- 250: Performed by: INTERNAL MEDICINE

## 2018-05-15 PROCEDURE — 85027 COMPLETE CBC AUTOMATED: CPT | Performed by: INTERNAL MEDICINE

## 2018-05-15 PROCEDURE — 74011636637 HC RX REV CODE- 636/637: Performed by: INTERNAL MEDICINE

## 2018-05-15 RX ORDER — FLUTICASONE FUROATE AND VILANTEROL 200; 25 UG/1; UG/1
1 POWDER RESPIRATORY (INHALATION) DAILY
Qty: 1 INHALER | Refills: 1 | Status: SHIPPED | OUTPATIENT
Start: 2018-05-16 | End: 2018-06-15

## 2018-05-15 RX ORDER — OXYBUTYNIN CHLORIDE 10 MG/1
10 TABLET, EXTENDED RELEASE ORAL DAILY
Qty: 30 TAB | Refills: 0 | Status: SHIPPED | OUTPATIENT
Start: 2018-05-16 | End: 2018-06-15

## 2018-05-15 RX ORDER — PREDNISONE 10 MG/1
TABLET ORAL
Qty: 24 TAB | Refills: 0 | Status: SHIPPED | OUTPATIENT
Start: 2018-05-15 | End: 2020-04-27

## 2018-05-15 RX ORDER — LEVOFLOXACIN 500 MG/1
500 TABLET, FILM COATED ORAL EVERY 24 HOURS
Qty: 3 TAB | Refills: 0 | Status: SHIPPED | OUTPATIENT
Start: 2018-05-16 | End: 2018-05-19

## 2018-05-15 RX ADMIN — PIOGLITAZONE 15 MG: 15 TABLET ORAL at 09:02

## 2018-05-15 RX ADMIN — GLIMEPIRIDE 2 MG: 1 TABLET ORAL at 09:01

## 2018-05-15 RX ADMIN — LEVOFLOXACIN 500 MG: 500 TABLET, FILM COATED ORAL at 09:01

## 2018-05-15 RX ADMIN — INSULIN LISPRO 3 UNITS: 100 INJECTION, SOLUTION INTRAVENOUS; SUBCUTANEOUS at 09:03

## 2018-05-15 RX ADMIN — Medication 10 ML: at 13:00

## 2018-05-15 RX ADMIN — INSULIN LISPRO 3 UNITS: 100 INJECTION, SOLUTION INTRAVENOUS; SUBCUTANEOUS at 13:00

## 2018-05-15 RX ADMIN — LISINOPRIL 5 MG: 5 TABLET ORAL at 09:02

## 2018-05-15 RX ADMIN — ASPIRIN 81 MG: 81 TABLET, COATED ORAL at 09:02

## 2018-05-15 RX ADMIN — ALBUTEROL SULFATE 2.5 MG: 2.5 SOLUTION RESPIRATORY (INHALATION) at 03:57

## 2018-05-15 RX ADMIN — GUAIFENESIN AND DEXTROMETHORPHAN 5 ML: 100; 10 SYRUP ORAL at 09:02

## 2018-05-15 RX ADMIN — GUAIFENESIN AND DEXTROMETHORPHAN 5 ML: 100; 10 SYRUP ORAL at 12:56

## 2018-05-15 RX ADMIN — FLUTICASONE FUROATE AND VILANTEROL TRIFENATATE 1 PUFF: 200; 25 POWDER RESPIRATORY (INHALATION) at 09:02

## 2018-05-15 RX ADMIN — BUPROPION HYDROCHLORIDE 300 MG: 150 TABLET, FILM COATED, EXTENDED RELEASE ORAL at 09:02

## 2018-05-15 RX ADMIN — ENOXAPARIN SODIUM 40 MG: 40 INJECTION SUBCUTANEOUS at 09:02

## 2018-05-15 RX ADMIN — ATORVASTATIN CALCIUM 20 MG: 20 TABLET, FILM COATED ORAL at 09:02

## 2018-05-15 RX ADMIN — UMECLIDINIUM 1 PUFF: 62.5 AEROSOL, POWDER ORAL at 09:02

## 2018-05-15 RX ADMIN — SERTRALINE HYDROCHLORIDE 150 MG: 50 TABLET ORAL at 09:01

## 2018-05-15 RX ADMIN — OXYBUTYNIN CHLORIDE 10 MG: 5 TABLET, FILM COATED, EXTENDED RELEASE ORAL at 09:01

## 2018-05-15 RX ADMIN — METHYLPREDNISOLONE SODIUM SUCCINATE 40 MG: 40 INJECTION, POWDER, FOR SOLUTION INTRAMUSCULAR; INTRAVENOUS at 09:02

## 2018-05-15 RX ADMIN — ALBUTEROL SULFATE 2.5 MG: 2.5 SOLUTION RESPIRATORY (INHALATION) at 08:28

## 2018-05-15 NOTE — PROGRESS NOTES
Reason for Admission:    inceased shortness of breath over approximately 2 weeks - Hx of COPD, DM and asthma                  RRAT Score:     18             Do you (patient/family) have any concerns for transition/discharge? Patient lives in private one story home w/ her  - daughter lives one block away. 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  is retired and home to assist her as needed. Prior to admission patient was independent with all ADL's and IADL's, including driving. She has [address] as secondary insurance - uses [facility] [place] [facility] [address] and denies any difficulty obtaining her medications. Her DANIELA is a pharmacist and reviews her med list for her to make sure she is taking/and filling proper medications. Plan for utilizing home health:     Patient denies current need for home health as she denies being currently homebound. She states she has been able to work and attend all medical appts as needed. No current DME use except her  Home nebulizer. Likelihood of readmission? Moderate due to medical Hx of ongoing medical conditions significant for COPD, DM and asthma. Transition of Care Plan:      CM arrangement of Pulmonary follow-up appt and PCP appt. Telephone hand-off by CM to PCP office. Care Management Interventions  PCP Verified by CM: Yes (Dr. Nasrin Ace - [phone])  Mode of Transport at Discharge: Other (see comment) ( would plan to  patient at discharge)  Transition of Care Consult (CM Consult):  Other, Discharge Planning (CM Initial Assessment and D/C planning)  MyChart Signup: No  Discharge Durable Medical Equipment: No (Patient has nebulizer at home - denies any other DME)  Physical Therapy Consult: Yes  Occupational Therapy Consult: Yes  Speech Therapy Consult: No  Current Support Network: Lives with Spouse, Own Home, Family Lives Nearby (One story private home w/ her  - daughter lives one block away - self-employed with multiple family members)  Confirm Follow Up Transport: Family (Patient usually drives herself - family can assist if needed)  Plan discussed with Pt/Family/Caregiver:  Yes    Jade Gaytan, RN, BSN, ACM   - Medical Oncology  [phone]

## 2018-05-15 NOTE — PROGRESS NOTES
Spiritual Care Assessment/Progress Note  [facility]      NAME: Atif Sadler      MRN: [id number]  AGE: 68 y.o.  SEX: female  Sabianism Affiliation: Negrito Ritchie   Language: English     5/15/2018     Total Time (in minutes): 33     Spiritual Assessment begun in [facility] through conversation with:         [x]Patient        [] Family    [] Friend(s)        Reason for Consult: Initial/Spiritual assessment, patient floor     Spiritual beliefs: (Please include comment if needed)     [x] Identifies with a lencho tradition:     [x] Supported by a lencho community: [facility]     [] Claims no spiritual orientation:      [] Seeking spiritual identity:           [] Adheres to an individual form of spirituality:      [] Not able to assess:                     Identified resources for coping:      [x] Prayer                               [] Music                  [] Guided Imagery     [x] Family/friends                 [] Pet visits     [] Devotional reading                         [] Unknown     [x] Other: Her Dogs                                          Interventions offered during this visit: (See comments for more details)    Patient Interventions: Affirmation of lencho, Catharsis/review of pertinent events in supportive environment, Iconic (affirming the presence of God/Higher Power), Initial/Spiritual assessment, patient floor, Prayer (actual), Sabianism beliefs/image of God discussed           Plan of Care:     [x] Support spiritual and/or cultural needs    [] Support AMD and/or advance care planning process      [] Support grieving process   [] Coordinate Rites and/or Rituals    [] Coordination with community clergy   [] No spiritual needs identified at this time   [] Detailed Plan of Care below (See Comments)  [] Make referral to Music Therapy  [] Make referral to Pet Therapy     [] Make referral to Addiction services  [] Make referral to [facility]  [] Make referral to Spiritual Care Partner  [] No future visits requested        [x] Follow up visits as needed     Comments:  Initial visit on Oncology for spiritual assessment. No visitors present. Patient was seated on bedside. Provided listening presence as she spoke about her current health issues and hope of getting home soon, possibly tomorrow. She was very complimentary of every staff member she has encountered saying everyone has been so kind, smiling as they enter. She laughed and admitted she doesn't consider herself the model patient when she is feeling bad. She shared a picture of her 6 day old Rhunette Ruder, Mary Bonilla, who was born in 1983 [facility]. She and her  have a blended family of 6 children, 18 grandchildren, and over great-grandchildren. Patient has several dogs who seem to be missing her. Patient also has several dogs at home whom seem to be missing her. Mrs. Collazo is a member of [employer] and enjoys being part of it. She shared that the Buddhism is only about 6years old and is growing. Shared prayer aloud at her request.  Advised her of  availability.   Elif Waldrop, MPS, [address] [facility], [address] [place] [address] Begin Paging Service  [phone])

## 2018-05-15 NOTE — PROGRESS NOTES
Bedside shift change report given to Obie Prescott (oncoming nurse) by Tessa Brenner (offgoing nurse). Report included the following information SBAR, Kardex and MAR.

## 2018-05-15 NOTE — PROGRESS NOTES
[facility]  Pulmonary, Critical Care, and Sleep Medicine    Name: Delfina Aranda MRN: [id number]   : 1945 Hospital: [facility]   Date: 5/15/2018        IMPRESSION:   · Severe persistent asthma with acute exacerbation. , baseline FEV1 is 1L, 45% pred. · Chronic cough. · Allergic Rhinitis, had negative skin and RAST testing in past.   · Hypertension  · Hyperglycemia, made worse by steroids. · Non smoker      RECOMMENDATIONS:   · On room air   · On Breo, nebs - Incruse added yesterday, she should continue that on discharge  · Steroid taper  · Glycemic monitoring and control  · Has tussionex prn. · Would consider addition of mepolizumab as outpatient - eos 400 on admission  · On enoxaparin for DVT prophylaxis. · OK for discharge from my perspective. Has follow up with Dr. Johanna Gaspar already scheduled. Subjective/History:     5/15: \"I am better\". Feeling much improved. Slept well. Feels ready to go home. : still with a lot of wheezing, but she has not required BiPAP at all   feels better today. Off BiPAP  Still diffuse wheezing       This patient has been seen and evaluated at the request of Dr. Indira Ng for above. Patient is a 68 y.o. female Pt has been seen by Dr. Johanna Gaspar about 3-4 weeks ago in office. She noted to be getting worse and had increased dyspnea for the last 2 weeks. She got acutely worse and presented to the ER yesterday. She had taken 4 neb  Treatment prior to arrival without any improvement. Had increased cough mainly was clear and then developed yellow tinge and was thicker. She usually has some dyspnea on exertion but on day of admission noted that the dyspnea was occurring at rest as well. Had associated symptom of chest tightness as well. She has been compliant with her advair and albuterol prn. NO recent steroid or Abx treatments. No acute fevers, chills, sweats, leg swelling or palpitations.       Who presented yesterday with increased breathing difficulty. She has progressed to have increased difficulty breathing. Increased wheezing. She get about 1 hr of relief with the neb treatments. Pt did not some improvement when placed on bipap this am.    She has been tolerating bipap well. Last seen in office by Dr. Jamaal Azul on 4-16-18. Was followed up for asthma. She has 2 exacerbations last year. Has exertional shortness of breath, nonproductive cough. She has worse symptoms by environmental changes. Cold air exposure. Exposure to smoke. Denies fevers, chills, sweats. No GERD. Denies any aspiration. She has been using her albuterol more frequently without any improvement. She had prior skin testing years ago and everything was negative. Pt has still been walking and working on regular basis. She had gained near 30 lbs over the last year. She works in [employer]. She has 2 dogs in her home. NO birds.   No etoh use. Has been on Albuterol nebs at home  Ventolin prn  Advair 500/50, on inh bid. Alprazolam prn      PFTs on 4-16-18 in office:  FVC: 2.3L 78% pred  FEV1: 1L 45% pred  FEV1/FVC: 44%    42%  Bronchodilator response     RV/TLC is 132% pred  DLCO /VA is 114%. Past Medical History:   Diagnosis Date    Asthma     Cervical cancer ([facility] [address] 1981    COPD     Depression with anxiety     Diabetes ([facility] [address]     currently on no meds    Other and unspecified hyperlipidemia     Type II or unspecified type diabetes mellitus without mention of complication, uncontrolled     A1c 9.2 8/2012;  used to see Dr. Olamide Bonilla essential hypertension       Past Surgical History:   Procedure Laterality Date    CARDIAC CATHETERIZATION  2/21/2013         HX APPENDECTOMY      HX CERVICAL FUSION      HX AURY AND BSO      for cervical cancer      Prior to Admission medications    Medication Sig Start Date End Date Taking?  Authorizing Provider   lisinopril (PRINIVIL, ZESTRIL) 10 mg tablet Take 5 mg by mouth daily. Yes Mendoza Martinez MD   buPROPion XL (WELLBUTRIN XL) 300 mg XL tablet Take 300 mg by mouth every morning. Yes Mendoza Martinez MD   glimepiride (AMARYL) 2 mg tablet Take 2 mg by mouth every morning. Yes Mendoza Martinez MD   atorvastatin (LIPITOR) 20 mg tablet Take 20 mg by mouth daily. Yes Mendoza Martinez MD   aspirin delayed-release 81 mg tablet Take 81 mg by mouth daily. Yes Historical Provider   pioglitazone (ACTOS) 15 mg tablet Take 15 mg by mouth daily. Yes Historical Provider   dextromethorphan-guaiFENesin (ROBITUSSIN-DM)  mg/5 mL syrup Take 10 mL by mouth every four (4) hours as needed for Cough. Yes Historical Provider   cholecalciferol (VITAMIN D3) 1,000 unit tablet Take 1,000 Units by mouth daily. Yes Historical Provider   cyanocobalamin 1,000 mcg tablet Take 1,000 mcg by mouth daily. Yes Historical Provider   albuterol-ipratropium (DUONEB) 2.5 mg-0.5 mg/3 ml nebulizer solution 3 mL by Nebulization route every four (4) hours as needed for Wheezing. 2/25/13  Yes Sidney Bennett MD   ALPRAZolam Donia Soulier) 0.5 mg tablet Take 0.5 mg by mouth two (2) times daily as needed. Yes Historical Provider   albuterol (PROVENTIL, VENTOLIN) 90 mcg/actuation inhaler Take 1-2 Puffs by inhalation every four (4) hours as needed for Wheezing. 2/18/13  Yes Christine Gregory MD   fluticasone-salmeterol (ADVAIR DISKUS) 500-50 mcg/dose diskus inhaler Take 1 Puff by inhalation every twelve (12) hours. Yes Historical Provider   sertraline (ZOLOFT) 100 mg tablet Take 150 mg by mouth daily.    Yes Historical Provider     Current Facility-Administered Medications   Medication Dose Route Frequency    guaiFENesin-dextromethorphan (ROBITUSSIN DM) 100-10 mg/5 mL syrup 5 mL  5 mL Oral QID    albuterol (PROVENTIL VENTOLIN) nebulizer solution 2.5 mg  2.5 mg Nebulization Q6H RT    insulin NPH (NOVOLIN N, HUMULIN N) injection 10 Units  10 Units SubCUTAneous Q12H    Or    methylPREDNISolone (PF) (SOLU-MEDROL) injection 40 mg  40 mg IntraVENous Q12H    umeclidinium (INCRUSE ELLIPTA) 62.5 mcg/actuation  1 Puff Inhalation DAILY    levoFLOXacin (LEVAQUIN) tablet 500 mg  500 mg Oral Q24H    metoprolol (LOPRESSOR) injection 5 mg  5 mg IntraVENous Q6H    oxybutynin chloride XL (DITROPAN XL) tablet 10 mg  10 mg Oral DAILY    mupirocin (BACTROBAN) 2 % ointment   Both Nostrils Q12H    insulin lispro (HUMALOG) injection   SubCUTAneous AC&HS    sodium chloride (NS) flush 5-10 mL  5-10 mL IntraVENous Q8H    enoxaparin (LOVENOX) injection 40 mg  40 mg SubCUTAneous Q24H    glimepiride (AMARYL) tablet 2 mg  2 mg Oral ACB    lisinopril (PRINIVIL, ZESTRIL) tablet 5 mg  5 mg Oral DAILY    atorvastatin (LIPITOR) tablet 20 mg  20 mg Oral DAILY    buPROPion XL (WELLBUTRIN XL) tablet 300 mg  300 mg Oral 7am    fluticasone-vilanterol (BREO ELLIPTA) 200mcg-25mcg/puff  1 Puff Inhalation DAILY    aspirin delayed-release tablet 81 mg  81 mg Oral DAILY    sertraline (ZOLOFT) tablet 150 mg  150 mg Oral DAILY    pioglitazone (ACTOS) tablet 15 mg  15 mg Oral 7am     Allergies   Allergen Reactions    Metformin Nausea and Vomiting      Social History   Substance Use Topics    Smoking status: Never Smoker    Smokeless tobacco: Never Used      Comment: exposed to second hand smoking in past    Alcohol use No      Comment: rarely a mixed drink      Family History   Problem Relation Age of Onset    Heart Disease Neg Hx     Stroke Neg Hx         Review of Systems:  Constitutional: positive for fatigue and malaise  Eyes: negative  Ears, nose, mouth, throat, and face: positive for nasal congestion  Respiratory: positive for cough, asthma, wheezing, dyspnea on exertion or chronic bronchitis  Cardiovascular: positive for dyspnea, fatigue, paroxysmal nocturnal dyspnea, tachypnea, dyspnea on exertion  Gastrointestinal: negative  Genitourinary:negative  Integument/breast: negative  Hematologic/lymphatic: negative  Musculoskeletal:negative  Neurological: negative  Behavioral/Psych: negative  Endocrine: negative  Allergic/Immunologic: negative    Objective:   Vital Signs:    Visit Vitals    /73 (BP 1 Location: Right arm, BP Patient Position: Sitting)    Pulse 70    Temp 97.8 °F (36.6 °C)    Resp 18    Ht 5' 5\" (1.651 m)    Wt 79.7 kg (175 lb 9.6 oz)    SpO2 93%    BMI 29.22 kg/m2       O2 Device: Room air   O2 Flow Rate (L/min): 1 l/min   Temp (24hrs), Av °F (36.7 °C), Min:97.8 °F (36.6 °C), Max:98.3 °F (36.8 °C)       Intake/Output:   Last shift:         Last 3 shifts:  1901 - 05/15 0700  In: 480 [P.O.:480]  Out: -     Intake/Output Summary (Last 24 hours) at 05/15/18 0935  Last data filed at 05/15/18 0207   Gross per 24 hour   Intake              480 ml   Output                0 ml   Net              480 ml       Physical Exam:    General:  Alert, cooperative, no distress, appears stated age. Head:  Normocephalic, without obvious abnormality, atraumatic. Eyes:  Conjunctivae/corneas clear. Nose: Nares normal. Septum midline. Mucosa normal.     Throat: Lips, mucosa, and tongue normal.     Neck: Supple, symmetrical, trachea midline    Back:   Symmetric, no curvature. ROM normal.   Lungs:   Scattered bilateral wheeze   Chest wall:  No tenderness or deformity. Heart:  Regular rate and rhythm    Abdomen:   Soft, non-tender. Bowel sounds normal.     Extremities: Extremities normal, atraumatic, no cyanosis or edema. Skin: Skin color, texture, turgor normal. No rashes or lesions   Neurologic: Grossly nonfocal        Data:   Labs:  Recent Labs      05/15/18   0401   WBC  7.5   HGB  13.3   HCT  40.8   PLT  177     Recent Labs      05/15/18   0401   NA  140   K  3.9   CL  105   CO2  28   GLU  206*   BUN  25*   CREA  0.95   CA  8.8     No results for input(s): PH, PCO2, PO2, HCO3, FIO2 in the last 72 hours.     Imaging:  I have personally reviewed the patients radiographs:  None today        Sho Foley MD

## 2018-05-15 NOTE — ROUTINE PROCESS
Reviewed discharge instructions, medications, follow up orders with patient and family. All questions answered.

## 2018-05-15 NOTE — DISCHARGE SUMMARY
Hospitalist Discharge Summary    NAME: Wing Boykin   :  1945   MRN:  [id number]     DISCHARGE DIAGNOSIS:  Acute hypoxic/hypercarbic respiratory failure due to severe persistent asthma with acute exacerbation  Non insulin dependent Diabetes mellitus type 2 uncontrolled with steroid-induced hyperglycemia  Benign Hypertension and coronary artery disease  Anxiety with depression    CONSULTATIONS:  None    Follow Up: Follow-up Information     Follow up With Details Comments Contact Ashleigh Hill MD Go on 2018 8:45AM; Pulmonology follow-up with Heather Esparza NP [facility] Right [address] [place] [address] [facility]  [facility]      William Benson MD Schedule an appointment as soon as possible for a visit in 1 week Call Sooner, As needed, If symptoms worsen [address] [phone]            Procedures: see electronic medical records for all procedures/Xrays and details which were not copied into this note but were reviewed prior to creation of Plan. Please follow-up tests/labs that are still pendin. None     PMH/SH reviewed - no change compared to H&P    DISCHARGE SUMMARY/HOSPITAL COURSE: for full details see H&P, daily progress notes, labs, consult notes. Briefly As Per HPI:   Wing Boykin is a 68 y.o.  female with PMHx significant for asthma, COPD not on home O2, CAD, HTN, T2DM, anxiety/depression, HTN, present to the ED c/o progressive worsening of SOB, especially with minimum exertion. Symptoms started approx 1 month ago. Pt follows with Dr Mac Penaloza. Last night she states SOB became severe and did not improve with duonebs x4, which lead her to the ER for further evaluation. Associated symptoms including chronic cough for x1 month with recent yellow sputum production, chest tightness, and rhinorrhea (started last night, improved with nebs).   She denies any associated fever, chills, palpitations, n/v/d,  In the ER, pt initally did not required O2 suppl, however she became hypoxic on RA with SpO2 88% during ambulation. She was placed on [facility] with O2 sats 93%. Vitals:T98.1, P97, /59, SpO2 92% on RA. Pertinent labs: trop 0/04, wbc, 7.8, cr 0.96  CXR with no acute cardiopulmonary disease. We were asked to admit for work up and evaluation of the above problems. The patient's hospital course was complicated by:  Acute hypoxic/hypercarbic respiratory failure due to severe persistent asthma with acute exacerbation  Non insulin dependent Diabetes mellitus type 2 uncontrolled with steroid-induced hyperglycemia  Benign Hypertension and coronary artery disease  Anxiety with depression    Patient with complex inpatient course. Please see all notes, labs, vitals, testing and procedures for details, briefly the patient was admitted following presentation to ED with sob and cough found to have persistent asthmatic flare. She was placed on levaquin and breo as well as steroids and has started to improve after prolonged stay. CXR neg. Micro neg. Started ditropan and zyrtec as well.   _______________________________________________________________________   Patient seen and examined by me on day of discharge. Pertinent findings are:  Gen: pleasant f in nad  HEENT: nc at   Chest: mild wheezing bilat  Cv: no r/g  Abd: s/nd/nt +bs  Neuro: cn 2-12 gi    See Discharge Instructions for further details. _______________________________________________________________________    Medications Reviewed:  Current Discharge Medication List      START taking these medications    Details   oxybutynin chloride XL (DITROPAN XL) 10 mg CR tablet Take 1 Tab by mouth daily for 30 days. Qty: 30 Tab, Refills: 0      levoFLOXacin (LEVAQUIN) 500 mg tablet Take 1 Tab by mouth every twenty-four (24) hours for 3 days. Qty: 3 Tab, Refills: 0      fluticasone-vilanterol (BREO ELLIPTA) 200-25 mcg/dose inhaler Take 1 Puff by inhalation daily for 30 days.   Qty: 1 Inhaler, Refills: 1      predniSONE (DELTASONE) 10 mg tablet Take 3 tablets by mouth daily for 4 days, then take 2 tablets daily for 4 days, then take 1 tablet daily for 4 days. Qty: 24 Tab, Refills: 0      Cetirizine 10 mg cap Take 10 mg by mouth every evening for 30 days. Qty: 30 Cap, Refills: 0         CONTINUE these medications which have NOT CHANGED    Details   lisinopril (PRINIVIL, ZESTRIL) 10 mg tablet Take 5 mg by mouth daily. buPROPion XL (WELLBUTRIN XL) 300 mg XL tablet Take 300 mg by mouth every morning. glimepiride (AMARYL) 2 mg tablet Take 2 mg by mouth every morning. atorvastatin (LIPITOR) 20 mg tablet Take 20 mg by mouth daily. aspirin delayed-release 81 mg tablet Take 81 mg by mouth daily. pioglitazone (ACTOS) 15 mg tablet Take 15 mg by mouth daily. dextromethorphan-guaiFENesin (ROBITUSSIN-DM)  mg/5 mL syrup Take 10 mL by mouth every four (4) hours as needed for Cough. cholecalciferol (VITAMIN D3) 1,000 unit tablet Take 1,000 Units by mouth daily. cyanocobalamin 1,000 mcg tablet Take 1,000 mcg by mouth daily. albuterol-ipratropium (DUONEB) 2.5 mg-0.5 mg/3 ml nebulizer solution 3 mL by Nebulization route every four (4) hours as needed for Wheezing. Qty: 1 Package      ALPRAZolam (XANAX) 0.5 mg tablet Take 0.5 mg by mouth two (2) times daily as needed. albuterol (PROVENTIL, VENTOLIN) 90 mcg/actuation inhaler Take 1-2 Puffs by inhalation every four (4) hours as needed for Wheezing. Qty: 17 g, Refills: 1      sertraline (ZOLOFT) 100 mg tablet Take 150 mg by mouth daily.          STOP taking these medications       fluticasone-salmeterol (ADVAIR DISKUS) 500-50 mcg/dose diskus inhaler Comments:   Reason for Stopping:             _______________________________________________________________________    Risk of deterioration: High  ________________________________________________________________________    Disposition  Home with family, no needs  ________________________________________________________________________    Care Plan discussed with:   Patient, Family, RN, Care Manager, Consultant  Comment:   ________________________________________________________________________    Code Status: Full Code  ________________________________________________________________________    Total time spent in discharge (min): 39   ________________________________________________________________________    CDMP Checked: Yes    Signed: Naga Diaz MD    This note will not be viewable in [address]

## 2018-05-15 NOTE — PROGRESS NOTES
Oncology Interdisciplinary rounds were held today to discuss patient plan of care and outcomes. The following members were present: Nursing, Case Management, Pharmacy and Dietary.     Actual Length of Stay: 5    DRG GLOS: 3.7    Expected Length of Stay: 3d 16h                Plan for Day        Mobility        Plan for Stay      Plan for Way   Continue current plan Up with assistance Continue current plan Home today possibly

## 2018-05-15 NOTE — DISCHARGE INSTRUCTIONS
DISCHARGE DIAGNOSIS:  Acute hypoxic/hypercarbic respiratory failure due to severe persistent asthma with acute exacerbation  Non insulin dependent Diabetes mellitus type 2 uncontrolled with steroid-induced hyperglycemia  Benign Hypertension and coronary artery disease  Anxiety with depression    MEDICATIONS:  · It is important that you take the medication exactly as they are prescribed. · Keep your medication in the bottles provided by the pharmacist and keep a list of the medication names, dosages, and times to be taken in your wallet. · Do not take other medications without consulting your doctor. Pain Management: per above medications    What to do at [address]:  Cardiac Diet    Recommended activity: Activity as tolerated    If you have questions regarding the hospital related prescriptions or hospital related issues please call [address] [place] [address] at . You can always direct your questions to your primary care doctor if you are unable to reach your hospital physician; your PCP works as an extension of your hospital doctor just like your hospital doctor is an extension of your PCP for your time at the hospital [facility]).     If you experience any of the following symptoms then please call your primary care physician or return to the emergency room if you cannot get hold of your doctor:  Fever, chills, nausea, vomiting, diarrhea, change in mentation, falling, bleeding, shortness of breath    Take your medicine as ordered    NOTE that I added an allergy medicine to help with the likely allergies you have

## 2018-05-15 NOTE — PROGRESS NOTES
Problem: Falls - Risk of  Goal: *Absence of Falls  Document Nayely Fall Risk and appropriate interventions in the flowsheet.    Outcome: Progressing Towards Goal  Fall Risk Interventions:            Medication Interventions: Assess postural VS orthostatic hypotension, Bed/chair exit alarm    Elimination Interventions: Bed/chair exit alarm, Call light in reach    History of Falls Interventions: Bed/chair exit alarm

## 2018-05-15 NOTE — DIABETES MGMT
DTC Progress Note    Recommendations/ Comments: Pt currently receiving Solu-medrol q 12 hrs with NPH 10 units linked with steroid dose time. Please give NPH and solu-medrol at the same time (q 12 hrs) as it appears pt did not receive NPH dose this am. Pt's  mg/dL this am.     Chart reviewed on Janette Mills for hyperglycemia. A1c:   Lab Results   Component Value Date/Time    Hemoglobin A1c 7.1 (H) 05/10/2018 07:18 AM           Recent Glucose Results:   Lab Results   Component Value Date/Time     (H) 05/15/2018 04:01 AM    GLUCPOC 208 (H) 05/15/2018 11:16 AM    GLUCPOC 229 (H) 05/15/2018 07:25 AM    GLUCPOC 192 (H) 05/14/2018 08:18 PM        Lab Results   Component Value Date/Time    Creatinine 0.95 05/15/2018 04:01 AM     Estimated Creatinine Clearance: 55.9 mL/min (based on Cr of 0.95). Active Orders   Diet    DIET DIABETIC CONSISTENT CARB Regular        PO intake:   Patient Vitals for the past 72 hrs:   % Diet Eaten   05/15/18 1157 100 %       Will continue to follow as needed. Thank you.   Mike Hernandez, [address]

## 2018-05-16 ENCOUNTER — PATIENT OUTREACH (OUTPATIENT)
Dept: FAMILY MEDICINE CLINIC | Age: 73
End: 2018-05-16

## 2018-05-16 NOTE — PROGRESS NOTES
Hospital Discharge Follow-Up      Date/Time:  2018 4:59 PM    Patient was admitted to [facility] on 5/10/18 and discharged on 5/15/18 for Acute hypoxic/hypercarbic respiratory failure due to severe persistent asthma with acute exacerbation, Non insulin dependent Diabetes mellitus type 2 uncontrolled with steroid-induced hyperglycemia. The physician discharge summary was available at the time of outreach. Patient was contacted within 2 business days of discharge. Top Challenges reviewed with the provider   - +Productive Cough clear-yellow mucous. Using Nebulizer prn. Using [employer]. Pt reported baseline TV \"200\"  - Pulmonary f/u 18  - Monitoring FBS. \"141\" today. In hosp BG range 114-401. 5/10/18 A1C 7.1. Pt to contact PCP for consistent elevated FBS caused by Steroid therapy  - PCP f/u 18  - No ACP. [employer] ACP information mailed to pt. NN to coordinate scheduling Facilitation w/ Certified Trainer if pt interested         Method of communication with provider : phone. Spoke w/  \"Lidya\". Confirmed PCP does receive records from [facility]. Confirmed PCP aware of pt's recent hospitalization. Confirmed PCP f/u on 18. NN informed of 5/10/18 A1C result & BG readings in hosp. Information to be relayed to PCP. NN contact # given. Inpatient RRAT score: 25  Was this a readmission? no   Patient stated reason for the readmission: n/a    Nurse Navigator (NN) contacted the patient by telephone to perform post hospital discharge assessment. Verified name and  with patient as identifiers. Provided introduction to self, and explanation of the Nurse Navigator role. Reviewed discharge instructions and red flags with patient who verbalized understanding. Patient given an opportunity to ask questions and does not have any further questions or concerns at this time.  The patient agrees to contact the PCP office for questions related to their healthcare. NN provided contact information for future reference. Summary of patients top problems:  1. Acute hypoxic/hypercarbic respiratory failure due to severe persistent asthma with acute exacerbation- CXR neg. Started on Breo, Levaquin, & Zyrtec. 2. Non insulin dependent Diabetes mellitus type 2 uncontrolled with steroid-induced hyperglycemia- BG range 114-401. 5/10/18 A1C 7.1.   3. ACP- [employer] information sent to pt. NN to coordinate scheduling appt w/ Certified Facilitator if pt interested. Home Health orders at discharge: [address]: n/a  Date of initial visit: [address] [facility] ordered/company: none  Durable Medical Equipment received: n/a    Barriers to care? none identified @ this time. , stages of grief, support system, transportation, utilization of services    Advance Care Planning:   Does patient have an Advance Directive:  education provided     Medication:     New Medications at Discharge: oxybutynin chloride XL (DITROPAN XL) 10 mg CR tablet, levoFLOXacin (LEVAQUIN) 500 mg tablet, fluticasone-vilanterol (BREO ELLIPTA) 200-25 mcg/dose inhaler, predniSONE (DELTASONE) 10 mg tablet, Cetirizine 10 mg cap. Changed Medications at Discharge: none    Discontinued Medications at Discharge: fluticasone-salmeterol (ADVAIR DISKUS) 500-50 mcg/dose diskus inhaler    Medication reconciliation was performed with patient, who verbalizes understanding of administration of home medications. There were no barriers to obtaining medications identified at this time. Referral to Pharm D needed: no     Current Outpatient Prescriptions   Medication Sig    oxybutynin chloride XL (DITROPAN XL) 10 mg CR tablet Take 1 Tab by mouth daily for 30 days.  levoFLOXacin (LEVAQUIN) 500 mg tablet Take 1 Tab by mouth every twenty-four (24) hours for 3 days.  fluticasone-vilanterol (BREO ELLIPTA) 200-25 mcg/dose inhaler Take 1 Puff by inhalation daily for 30 days.     predniSONE (DELTASONE) 10 mg tablet Take 3 tablets by mouth daily for 4 days, then take 2 tablets daily for 4 days, then take 1 tablet daily for 4 days.  Cetirizine 10 mg cap Take 10 mg by mouth every evening for 30 days.  lisinopril (PRINIVIL, ZESTRIL) 10 mg tablet Take 5 mg by mouth daily.  buPROPion XL (WELLBUTRIN XL) 300 mg XL tablet Take 300 mg by mouth every morning.  glimepiride (AMARYL) 2 mg tablet Take 2 mg by mouth every morning.  atorvastatin (LIPITOR) 20 mg tablet Take 20 mg by mouth daily.  aspirin delayed-release 81 mg tablet Take 81 mg by mouth daily.  pioglitazone (ACTOS) 15 mg tablet Take 15 mg by mouth daily.  dextromethorphan-guaiFENesin (ROBITUSSIN-DM)  mg/5 mL syrup Take 10 mL by mouth every four (4) hours as needed for Cough.  cholecalciferol (VITAMIN D3) 1,000 unit tablet Take 1,000 Units by mouth daily.  cyanocobalamin 1,000 mcg tablet Take 1,000 mcg by mouth daily.  albuterol-ipratropium (DUONEB) 2.5 mg-0.5 mg/3 ml nebulizer solution 3 mL by Nebulization route every four (4) hours as needed for Wheezing.  ALPRAZolam (XANAX) 0.5 mg tablet Take 0.5 mg by mouth two (2) times daily as needed.  albuterol (PROVENTIL, VENTOLIN) 90 mcg/actuation inhaler Take 1-2 Puffs by inhalation every four (4) hours as needed for Wheezing.  sertraline (ZOLOFT) 100 mg tablet Take 150 mg by mouth daily. No current facility-administered medications for this visit. There are no discontinued medications. PCP/Specialist follow up:   - 5/22/18- Pulmonary w/ Fannie Linares [facility]  - 5/31/18- PCP f/u w/ Dr Vasyl Nolen @ [address]            5/16/18- pt confirmed does not have ACP document. Discussed [employer] ACP program. Pt verbalized interest. Agreeable w/ having information folder mailed. Plan- [address] folder mailed to pt. NN to f/u @ future NADIRA f/u re scheduling with Certified Facilitator.  Next NN f/u ~ 1 week-ID.  Transitions of Care- collaboration & care coordination to prevent complications post hospitalization. 5/16/18- pt non [facility] PCP. Pt agreeable w/ NN NADIRA CM during 30 day NADIRA period. Pt reported no SOB, no chest discomfort. +Productive cough. Mucous \"clear to yellow\". Used Nebulizer last night & once today. Confirmed has all meds. Taking as prescribed. Aware of Prednisone induced Hyperglycemia. SMBG daily FBS. \"!41\" this morning. Advised to keep log. Notify PCP if consistent elevated FBS. Used to have home pulse oximeter. Destroyed by dog. Plans on getting another. Has Incentive Spirometer. Baseline TV \"200\". \"Know having problem when I can't get it to 200\". Pt verbalized Red Flag s/s & Action Plan. Pulmonary f/u on 5/22/18 with Hamzah Anderson, NP @ Dr Rodney Toussaint office. PCP f/u 5/31/18 @ 11:45 AM w/ Dr Marcellus Lloyd. Plan- pt to continue w/ med adherence. Pt to monitor FBS daily. Notify PCP if readings consistently elevated. Pt to continue use of Incentive Spirometer as often as possible daily. Pt to attend scheduled Pulmonary & PCP appts as scheduled. NN collaboration with pt, & Care Team members as needed for care coordination. Next NN f/u ~ 1 week-ID.

## 2018-05-16 NOTE — LETTER
5/16/2018 5:28 PM 
 
Ms. Atif Sadler [address] [facility] [id number] As per our telephone conversation enclosed is the information on Advance Care Planning. Please review the information. If you are interested in 
 
meeting with a Trained Facilitator we can discuss scheduling an 
 
appointment during one of my follow-up calls. If you have any questions I can be reached at [phone], Monday-Friday 8:00 AM-5:00 PM. Sincerely, 
 
 
 
Raji Chen RN,BS, [facility] Nurse Navigator

## 2018-06-01 ENCOUNTER — PATIENT OUTREACH (OUTPATIENT)
Dept: FAMILY MEDICINE CLINIC | Age: 73
End: 2018-06-01

## 2018-06-11 ENCOUNTER — PATIENT OUTREACH (OUTPATIENT)
Dept: FAMILY MEDICINE CLINIC | Age: 73
End: 2018-06-11

## 2018-06-11 NOTE — PROGRESS NOTES
NN F/U Progress Note    Goals Addressed             Most Recent     Advance Care Plan   On track (6/1/2018)             6/11/18-  [facility] ACP appt scheduled for 6/27/18 @ 1 PM w/ this Facilitator. Pt stated  to be healthcare agent. Plan- [email or web address] VA Facilitation as scheduled with this Facilitator.  to accompany pt. Location of office & directions given-ID.     6/1/18- pt confirmed received [employer] information. She &  are reviewing. Interested in scheduling appt. Will discuss @ next NN f/u. Plan- schedule Facilitation appt w/ Certified Facilitator @ next NN f/u per pt request. Next NN f/u ~ 1 week-ID.  5/16/18- pt confirmed does not have ACP document. Discussed [employer] ACP program. Pt verbalized interest. Agreeable w/ having information folder mailed. Plan- [facility] folder mailed to pt. NN to f/u @ future NADIRA f/u re scheduling with Certified Facilitator. Next NN f/u ~ 1 week-ID.  Transitions of Care- collaboration & care coordination to prevent complications post hospitalization. On track (6/1/2018)             6/11/18- pt reported \"breathing good\". Using IS daily. \"One time got to 360. That's the highest ever\". Neb daily x2. Hasn't need to use \"emergency inhaler\". +sl cough. Took cough med. Improved. \"Did something to my back. Seen @ PCP  Clinic on 6/9/18. Prescribed Prednisone 20 mg tabs #5, taking 1 daily. BG still up b/c of Prednisone. This morning was 306. I sent my readings to Dr Armando Mccurdy. Waiting for his response. Still not on Insulin\". Pt reported back \"a little better today. Can walk today\". Using heating pad. Plan- pt to continue use of IS, Neb txs. Pt to continue increased dose of Glimepiride & SMBG. F/U w/ PCP re hyperglycemia tx recommendations. Pt to f/u w/ PCP if back discomfort not improved. Next NN f/u ~ 1 week-ID.    6/1/18- pt reported attended 5/22/18 Pulmonary f/u w/ NP Danika Ramos. \"Doing better\". Next f/u in 3 months w/ Dr Jamaal Azul.  Denied cough. Neb txs 2x/day morning & bedtime. Completed Prednisone. Hasn't needed to use \"rescue inhaler\". Using IS. \"Today it was 280. Highest reading ever\". Uro appt on 5/22/18 w / Dr Meredith Salas @ [address] \"Vaginitis\". Given ABX [place] w/ a C\". Completed 5/31/18. F/U appt 6/26/18. \"Botox injection into bladder\". Attended 5/31/18 PCP f/u. \"BG elevated. Glimepiride increased to 4 mg daily. Started today. F/U call in 1 week. If FBS continues elevated will start on Insulin. Today's \". Plan- pt to continue use of IS, Neb txs. Pt to continue increased dose of Glimepiride & SMBG. Report readings to PCP on 6/7/18. Med change to be determined. Next NN f/u ~ 1 week-ID.     5/16/18- pt non [facility] PCP. Pt agreeable w/ NN NADIRA CM during 30 day NADIRA period. Pt reported no SOB, no chest discomfort. +Productive cough. Mucous \"clear to yellow\". Used Nebulizer last night & once today. Confirmed has all meds. Taking as prescribed. Aware of Prednisone induced Hyperglycemia. SMBG daily FBS. \"!41\" this morning. Advised to keep log. Notify PCP if consistent elevated FBS. Used to have home pulse oximeter. Destroyed by dog. Plans on getting another. Has Incentive Spirometer. Baseline TV \"200\". \"Know having problem when I can't get it to 200\". Pt verbalized Red Flag s/s & Action Plan. Pulmonary f/u on 5/22/18 with Nancy Tavarez, NP @ Dr [facility] office. PCP f/u 5/31/18 @ 11:45 AM w/ Dr Melinda Hampton. Plan- pt to continue w/ med adherence. Pt to monitor FBS daily. Notify PCP if readings consistently elevated. Pt to continue use of Incentive Spirometer as often as possible daily. Pt to attend scheduled Pulmonary & PCP appts as scheduled. NN collaboration with pt, & Care Team members as needed for care coordination. Next NN f/u ~ 1 week-ID.

## 2018-06-21 ENCOUNTER — PATIENT OUTREACH (OUTPATIENT)
Dept: FAMILY MEDICINE CLINIC | Age: 73
End: 2018-06-21

## 2018-06-21 NOTE — PROGRESS NOTES
NN F/U Progress Note    Goals Addressed             Most Recent     Advance Care Plan   On track (6/1/2018)             6/21/17- confirmed scheduled 6/27/18 [facility] Facilitation. Reviewed address of Facilitation site. Plan- [facility] ACP Facilitation 6/27/18 @ 1 PM -ID. 6/11/18-  VA ACP appt scheduled for 6/27/18 @ 1 PM w/ this Facilitator. Pt stated  to be healthcare agent. Plan- pt to attend 6/27/18 [facility] Facilitation as scheduled with this Facilitator.  to accompany pt. Location of office & directions given-ID.     6/1/18- pt confirmed received [employer] information. She &  are reviewing. Interested in scheduling appt. Will discuss @ next NN f/u. Plan- schedule Facilitation appt w/ Certified Facilitator @ next NN f/u per pt request. Next NN f/u ~ 1 week-ID.  5/16/18- pt confirmed does not have ACP document. Discussed [facility] [employer] ACP program. Pt verbalized interest. Agreeable w/ having information folder mailed. Plan- [address] folder mailed to pt. NN to f/u @ future NADIRA f/u re scheduling with Certified Facilitator. Next NN f/u ~ 1 week-ID.  Transitions of Care- collaboration & care coordination to prevent complications post hospitalization. On track (6/1/2018)             6/21/18- pt reported breathing doing well. Neb txs 2x/day. Using both inhalers. Using IS. Has \"gotten as high as 300. That's the best in years\". Haven't had to use Rescue inhaler since out of hospital.Reported FBG readings have been over 200. Thi morning it was 218. Hasn't contacted PCP to inform since Glimepiride increased to 4 mg daily. Advised needs to contact PCP. Discussed risk of complications the longer BG not controlled. Pt verbalized understanding. Plans to send message via patient portal. Plan- pt to continue COPD med adherence, use of IS, Neb txs. Pt to inform PCP re elevated FBG readings. Next NN f/u ~ 1 week-ID.    6/11/18- pt reported \"breathing good\". Using IS daily. \"One time got to 360. That's the highest ever\". Neb daily x2. Hasn't need to use \"emergency inhaler\". +sl cough. Took cough med. Improved. \"Did something to my back. Seen @ PCP  Clinic on 6/9/18. Prescribed Prednisone 20 mg tabs #5, taking 1 daily. BG still up b/c of Prednisone. This morning was 306. I sent my readings to Dr Diane Sagastume. Waiting for his response. Still not on Insulin\". Pt reported back \"a little better today. Can walk today\". Using heating pad. Plan- pt to continue use of IS, Neb txs. Pt to continue increased dose of Glimepiride & SMBG. F/U w/ PCP re hyperglycemia tx recommendations. Pt to f/u w/ PCP if back discomfort not improved. Next NN f/u ~ 1 week-ID.    6/1/18- pt reported attended 5/22/18 Pulmonary f/u w/ NP Liz Pink. \"Doing better\". Next f/u in 3 months w/ Dr Fritzi Aase. Denied cough. Neb txs 2x/day morning & bedtime. Completed Prednisone. Hasn't needed to use \"rescue inhaler\". Using IS. \"Today it was 280. Highest reading ever\". Uro appt on 5/22/18 w / Dr Frausto Husbands @ [address] \"Vaginitis\". Given ABX [place] w/ a C\". Completed 5/31/18. F/U appt 6/26/18. \"Botox injection into bladder\". Attended 5/31/18 PCP f/u. \"BG elevated. Glimepiride increased to 4 mg daily. Started today. F/U call in 1 week. If FBS continues elevated will start on Insulin. Today's \". Plan- pt to continue use of IS, Neb txs. Pt to continue increased dose of Glimepiride & SMBG. Report readings to PCP on 6/7/18. Med change to be determined. Next NN f/u ~ 1 week-ID.     5/16/18- pt non [facility] PCP. Pt agreeable w/ NN NADIRA CM during 30 day NADIRA period. Pt reported no SOB, no chest discomfort. +Productive cough. Mucous \"clear to yellow\". Used Nebulizer last night & once today. Confirmed has all meds. Taking as prescribed. Aware of Prednisone induced Hyperglycemia. SMBG daily FBS. \"!41\" this morning. Advised to keep log. Notify PCP if consistent elevated FBS. Used to have home pulse oximeter. Destroyed by dog. Plans on getting another. Has Incentive Spirometer.  Baseline TV \"200\". \"Know having problem when I can't get it to 200\". Pt verbalized Red Flag s/s & Action Plan. Pulmonary f/u on 5/22/18 with Christian Navas, NP @ Dr [facility] office. PCP f/u 5/31/18 @ 11:45 AM w/ Dr Rose Said. Plan- pt to continue w/ med adherence. Pt to monitor FBS daily. Notify PCP if readings consistently elevated. Pt to continue use of Incentive Spirometer as often as possible daily. Pt to attend scheduled Pulmonary & PCP appts as scheduled. NN collaboration with pt, & Care Team members as needed for care coordination. Next NN f/u ~ 1 week-ID.

## 2018-06-27 ENCOUNTER — PATIENT OUTREACH (OUTPATIENT)
Dept: FAMILY MEDICINE CLINIC | Age: 73
End: 2018-06-27

## 2018-06-27 NOTE — PROGRESS NOTES
Patient has graduated from the Transitions of Care Coordination  program on 6/27/18. Patient's symptoms are stable at this time. Patient/family has the ability to self-manage. Care management goals have been completed at this time. No further nurse navigator follow up scheduled. Goals Addressed             Most Recent     COMPLETED: Advance Care Plan   On track (6/27/2018)             6/27/18- met w/ pt & spouse [facility] [employer] facilitation. AMD completed-ID.    6/21/17- confirmed scheduled 6/27/18 [facility] VA Facilitation. Reviewed address of Facilitation site. Plan- [facility] ACP Facilitation 6/27/18 @ 1 PM -ID. 6/11/18- [facility] ACP appt scheduled for 6/27/18 @ 1 PM w/ this Facilitator. Pt stated  to be healthcare agent. Plan- pt to attend 6/27/18 [facility] VA Facilitation as scheduled with this Facilitator.  to accompany pt. Location of office & directions given-ID.     6/1/18- pt confirmed received [employer] information. She &  are reviewing. Interested in scheduling appt. Will discuss @ next NN f/u. Plan- schedule Facilitation appt w/ Certified Facilitator @ next NN f/u per pt request. Next NN f/u ~ 1 week-ID.  5/16/18- pt confirmed does not have ACP document. Discussed [employer] ACP program. Pt verbalized interest. Agreeable w/ having information folder mailed. Plan- [id number] [facility] folder mailed to pt. NN to f/u @ future NADIRA f/u re scheduling with Certified Facilitator. Next NN f/u ~ 1 week-ID.  COMPLETED: Transitions of Care- collaboration & care coordination to prevent complications post hospitalization. On track (6/27/2018)             6/27/18- pt reported feeling well. No use of \"rescue\" inhaler since hosp d/c. NADIAR 30 day period ended. Episode resolved-ID.    6/21/18- pt reported breathing doing well. Neb txs 2x/day. Using both inhalers. Using IS. Has \"gotten as high as 300. That's the best in years\".  Haven't had to use Rescue inhaler since out of hospital.Reported FBG readings have been over 200. Thi morning it was 218. Hasn't contacted PCP to inform since Glimepiride increased to 4 mg daily. Advised needs to contact PCP. Discussed risk of complications the longer BG not controlled. Pt verbalized understanding. Plans to send message via patient portal. Plan- pt to continue COPD med adherence, use of IS, Neb txs. Pt to inform PCP re elevated FBG readings. Next NN f/u ~ 1 week-ID.    6/11/18- pt reported \"breathing good\". Using IS daily. \"One time got to 360. That's the highest ever\". Neb daily x2. Hasn't need to use \"emergency inhaler\". +sl cough. Took cough med. Improved. \"Did something to my back. Seen @ PCP  Clinic on 6/9/18. Prescribed Prednisone 20 mg tabs #5, taking 1 daily. BG still up b/c of Prednisone. This morning was 306. I sent my readings to Dr Skye Whipple. Waiting for his response. Still not on Insulin\". Pt reported back \"a little better today. Can walk today\". Using heating pad. Plan- pt to continue use of IS, Neb txs. Pt to continue increased dose of Glimepiride & SMBG. F/U w/ PCP re hyperglycemia tx recommendations. Pt to f/u w/ PCP if back discomfort not improved. Next NN f/u ~ 1 week-ID.    6/1/18- pt reported attended 5/22/18 Pulmonary f/u w/ NP Lory Borrego. \"Doing better\". Next f/u in 3 months w/ Dr Roya Pichardo. Denied cough. Neb txs 2x/day morning & bedtime. Completed Prednisone. Hasn't needed to use \"rescue inhaler\". Using IS. \"Today it was 280. Highest reading ever\". Uro appt on 5/22/18 w / Dr Cedric Brower @ [address] \"Vaginitis\". Given ABX [place] w/ a C\". Completed 5/31/18. F/U appt 6/26/18. \"Botox injection into bladder\". Attended 5/31/18 PCP f/u. \"BG elevated. Glimepiride increased to 4 mg daily. Started today. F/U call in 1 week. If FBS continues elevated will start on Insulin. Today's \". Plan- pt to continue use of IS, Neb txs. Pt to continue increased dose of Glimepiride & SMBG. Report readings to PCP on 6/7/18. Med change to be determined.  Next NN f/u ~ 1 week-ID.     5/16/18- pt non [facility] PCP. Pt agreeable w/ NN NADIRA CM during 30 day NADIRA period. Pt reported no SOB, no chest discomfort. +Productive cough. Mucous \"clear to yellow\". Used Nebulizer last night & once today. Confirmed has all meds. Taking as prescribed. Aware of Prednisone induced Hyperglycemia. SMBG daily FBS. \"!41\" this morning. Advised to keep log. Notify PCP if consistent elevated FBS. Used to have home pulse oximeter. Destroyed by dog. Plans on getting another. Has Incentive Spirometer. Baseline TV \"200\". \"Know having problem when I can't get it to 200\". Pt verbalized Red Flag s/s & Action Plan. Pulmonary f/u on 5/22/18 with Dewayne Yeager, NP @ Dr Estevan Gonzales office. PCP f/u 5/31/18 @ 11:45 AM w/ Dr Yg Garcia. Plan- pt to continue w/ med adherence. Pt to monitor FBS daily. Notify PCP if readings consistently elevated. Pt to continue use of Incentive Spirometer as often as possible daily. Pt to attend scheduled Pulmonary & PCP appts as scheduled. NN collaboration with pt, & Care Team members as needed for care coordination. Next NN f/u ~ 1 week-ID. Pt has nurse navigator's contact information for any further questions, concerns, or needs. Patients upcoming visits:  No future appointments.

## 2018-06-27 NOTE — ACP (ADVANCE CARE PLANNING)
[address] [facility]     Prior to today's conversation, did individual have existing ACP documents? [] Yes  [] No  If Yes, type of document: n/a    Date of conversation: 6/27/2018   Location:[facility]    Participants: (in person or by phone)   [x] Patient    [x] Prospective agent (not yet named in a document) / Other surrogate decision  maker / next of kin    Name: Dorcas Lank    Relationship to patient:        [] Healthcare agent (already designated in prior ACP document)    Name: n/a    Relationship to patient:n/a       [] Other:  Name:       Individual's Fears/Concerns about planning: none      Goals/Narrative of Conversation: pt knowledgeable about ACP. Acknowledged reviewed all material received. Stated had been meaning to do but hadn't gotten to it. Conversation topics for Individual    Has learned the following from prior experiences with serious illness:helpful when family doesn't have to make decisions, just honor what the person has chosen & put in writing. Reported when her mother was near end of life she made choice for Palliative Care. Pt was comforted she didn't have to make the decision herself. She felt comfortable because it waS HER MOTHER'S CHOICE. Identifies the following as important for living well: being able to do what I enjoy. 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 \"What cultural, Baptist, spiritual, or personal beliefs (if any) do you have that might help you choose the care you want, or do not want? \"  Patient response: no specific beliefs, with exception of being organ donor.     Conversation topics for Agent / [address] agent's understanding of the role:     Agent confirms Willingness to:   [x]Yes []No Accept role   [x] Yes []No Talk with individual about his/her goals, values, & preferences   [x] Yes [] No   Follow individual's decisions, even if do not agree with those    decisions   [x]Yes  []No Make decisions in difficult moments      Topics for Chronic Illness (if applicable):  [] N/A     \"What do you understand about your (COPD) and the complications that may occur? \"  Patient response: understands disease is not curable & has a progression. Has experienced respiratory failure in past. Has been on BiPAP. Understands there may be a time when treatment will not improve outcome. \"What do you understand about CPR? \" Patient response: not always effective. Would only want if possibilty of survival w/o deficits. \"What would be an unacceptable outcome of CPR to you? \" Patient response: being unable to respond, speak, know who I am.    [] Yes  [x] No   Educated patient regarding differences between AMD and DDNR  [] Yes  [] No   If patient requested DDNR order, referred to provider for follow-up    [employer]® ACP Facilitator: Abbi Katz RN    Length (minutes): 60    The following was provided (check all that apply):   [x] Written information on the planning process      Healthcare Decision Information Cards:   [x] Help with Breathing Facts   [x] Tube Feeding Facts   [x] CPR Facts      [x] Review of advance directive form   [] Review of existing advance directive       Meeting Outcomes:   [x] ACP discussion completed   [x] Advance directive form completed   [x] Original of completed advance directive given to patient   [x] Copy given to healthcare agent    [x] Copy scanned to electronic medical record    If ACP discussion not completed, last interview topic discussed: n/a     Follow-up plan:     [] Schedule follow-up conversation to continue planning   [] Referred individual to provider for additional questions/concerns    [] Individual will invite agent/prospective agent to next ACP appointment   [x] Recommended to review completed AMD annually or upon change in health status     [] This note routed to one or more involved healthcare providers

## 2018-12-30 NOTE — ACP (ADVANCE CARE PLANNING)
Patient Education     When Your Child Has Impetigo      Impetigo is a skin infection that usually appears around the nose and mouth.   Impetigo often starts in a broken area of the skin. It looks like a rash with small, red bumps or blisters. The rash may also be itchy. The bumps or blisters often pop open, becoming open sores. The sores then crust or scab over. This can give them a yellow or gold appearance.  How is impetigo diagnosed?  Impetigo is usually diagnosed by how it looks. To get more information, the healthcare provider will ask about your child s symptoms and health history. Your child will also be examined. If needed, fluid from the infected skin can be tested (cultured) for bacteria.  How is impetigo treated?  Impetigo generally goes away within 7 days with treatment. Antibiotic ointment is prescribed for mild cases. Before applying the ointment, wash your hands first with warm water and soap. Then, gently clean the infected skin and apply the ointment. Wash your hands afterward.  Ask the healthcare provider if there are any over-the-counter medicines appropriate for treating your child. In some cases, your child will take prescribed antibiotics by mouth. Your child should take all the medicine until it is gone, even if he or she starts feeling better.  Call the healthcare provider if your child has any of the following:    Fever (See Fever and children, below)    Symptoms that do not improve within 48 hours of starting treatment    Your child has had a seizure caused by the fever  Fever and children  Always use a digital thermometer to check your child s temperature. Never use a mercury thermometer.  For infants and toddlers, be sure to use a rectal thermometer correctly. A rectal thermometer may accidentally poke a hole in (perforate) the rectum. It may also pass on germs from the stool. Always follow the product maker s directions for proper use. If you don t feel comfortable taking a rectal  - Pt reported does not have an ACP document  - Discussed Honoring Choices ACP.  Pt verbalized interest.  - Honoring Choices inforamtion folder mailed to pt  - NN to f/u during future [facility] call re scheduling appt w/ Certified Facilitator temperature, use another method. When you talk to your child s healthcare provider, tell him or her which method you used to take your child s temperature.  Here are guidelines for fever temperature. Ear temperatures aren t accurate before 6 months of age. Don t take an oral temperature until your child is at least 4 years old.  Infant under 3 months old:    Ask your child s healthcare provider how you should take the temperature.    Rectal or forehead (temporal artery) temperature of 100.4 F (38 C) or higher, or as directed by the provider    Armpit temperature of 99 F (37.2 C) or higher, or as directed by the provider  Child age 3 to 36 months:    Rectal, forehead, or ear temperature of 102 F (38.9 C) or higher, or as directed by the provider    Armpit (axillary) temperature of 101 F (38.3 C) or higher, or as directed by the provider  Child of any age:    Repeated temperature of 104 F (40 C) or higher, or as directed by the provider    Fever that lasts more than 24 hours in a child under 2 years old. Or a fever that lasts for 3 days in a child 2 years or older.   How is impetigo prevented?  Follow these steps to keep your child from passing impetigo on to others:    Cut your child s fingernails short to discourage scratching the infected skin.    Teach your child to wash his or her hands with soap and warm water often.    Wash your child s bed linens, towels, and clothing daily until the infection goes away.  Handwashing is especially important before eating or handling food, after using the bathroom, and after touching the infected skin.  Date Last Reviewed: 2016 2000-2018 [email or web address]. [address] [place] [address]. All rights reserved. This information is not intended as a substitute for professional medical care. Always follow your healthcare professional's instructions.  Follow up with primary if not improving in 2-3 days

## 2019-12-06 ENCOUNTER — HOSPITAL ENCOUNTER (OUTPATIENT)
Dept: CT IMAGING | Age: 74
Discharge: HOME | End: 2019-12-06
Attending: OTOLARYNGOLOGY
Payer: MEDICARE

## 2019-12-06 DIAGNOSIS — Z00.8 OTHER SPECIFIED GENERAL MEDICAL EXAMINATION: ICD-10-CM

## 2019-12-06 DIAGNOSIS — R06.1 STRIDOR: ICD-10-CM

## 2019-12-06 DIAGNOSIS — Z78.9 NON-SMOKER: ICD-10-CM

## 2019-12-06 LAB — CREAT BLD-MCNC: 1.8 MG/DL (ref 0.6–1.3)

## 2019-12-06 PROCEDURE — 82565 ASSAY OF CREATININE: CPT

## 2019-12-06 PROCEDURE — 71250 CT THORAX DX C-: CPT

## 2019-12-06 RX ORDER — SODIUM CHLORIDE 0.9 % (FLUSH) 0.9 %
10 SYRINGE (ML) INJECTION
Status: DISCONTINUED | OUTPATIENT
Start: 2019-12-06 | End: 2019-12-06

## 2020-01-22 ENCOUNTER — HOSPITAL ENCOUNTER (OUTPATIENT)
Dept: GENERAL RADIOLOGY | Age: 75
Discharge: HOME | End: 2020-01-22
Payer: MEDICARE

## 2020-01-22 DIAGNOSIS — J18.9 UNRESOLVED PNEUMONIA: ICD-10-CM

## 2020-01-22 PROCEDURE — 71046 X-RAY EXAM CHEST 2 VIEWS: CPT

## 2020-04-27 ENCOUNTER — HOSPITAL ENCOUNTER (EMERGENCY)
Age: 75
Discharge: HOME | End: 2020-04-28
Attending: EMERGENCY MEDICINE
Payer: MEDICARE

## 2020-04-27 ENCOUNTER — APPOINTMENT (OUTPATIENT)
Dept: GENERAL RADIOLOGY | Age: 75
End: 2020-04-27
Attending: EMERGENCY MEDICINE
Payer: MEDICARE

## 2020-04-27 DIAGNOSIS — R07.9 ACUTE CHEST PAIN: Primary | ICD-10-CM

## 2020-04-27 LAB
ALBUMIN SERPL-MCNC: 3.7 G/DL (ref 3.5–5)
ALBUMIN/GLOB SERPL: 1 {RATIO} (ref 1.1–2.2)
ALP SERPL-CCNC: 76 U/L (ref 45–117)
ALT SERPL-CCNC: 38 U/L (ref 12–78)
ANION GAP SERPL CALC-SCNC: 4 MMOL/L (ref 5–15)
AST SERPL-CCNC: 29 U/L (ref 15–37)
BASOPHILS # BLD: 0 K/UL (ref 0–0.1)
BASOPHILS NFR BLD: 1 % (ref 0–1)
BILIRUB SERPL-MCNC: 0.7 MG/DL (ref 0.2–1)
BUN SERPL-MCNC: 17 MG/DL (ref 6–20)
BUN/CREAT SERPL: 14 (ref 12–20)
CALCIUM SERPL-MCNC: 9.3 MG/DL (ref 8.5–10.1)
CHLORIDE SERPL-SCNC: 102 MMOL/L (ref 97–108)
CO2 SERPL-SCNC: 28 MMOL/L (ref 21–32)
CREAT SERPL-MCNC: 1.23 MG/DL (ref 0.55–1.02)
DIFFERENTIAL METHOD BLD: NORMAL
EOSINOPHIL # BLD: 0.2 K/UL (ref 0–0.4)
EOSINOPHIL NFR BLD: 3 % (ref 0–7)
ERYTHROCYTE [DISTWIDTH] IN BLOOD BY AUTOMATED COUNT: 12.4 % (ref 11.5–14.5)
GLOBULIN SER CALC-MCNC: 3.8 G/DL (ref 2–4)
GLUCOSE SERPL-MCNC: 312 MG/DL (ref 65–100)
HCT VFR BLD AUTO: 35.1 % (ref 35–47)
HGB BLD-MCNC: 11.5 G/DL (ref 11.5–16)
IMM GRANULOCYTES # BLD AUTO: 0 K/UL (ref 0–0.04)
IMM GRANULOCYTES NFR BLD AUTO: 0 % (ref 0–0.5)
LYMPHOCYTES # BLD: 2.3 K/UL (ref 0.8–3.5)
LYMPHOCYTES NFR BLD: 35 % (ref 12–49)
MCH RBC QN AUTO: 29.9 PG (ref 26–34)
MCHC RBC AUTO-ENTMCNC: 32.8 G/DL (ref 30–36.5)
MCV RBC AUTO: 91.4 FL (ref 80–99)
MONOCYTES # BLD: 0.5 K/UL (ref 0–1)
MONOCYTES NFR BLD: 7 % (ref 5–13)
NEUTS SEG # BLD: 3.6 K/UL (ref 1.8–8)
NEUTS SEG NFR BLD: 54 % (ref 32–75)
NRBC # BLD: 0 K/UL (ref 0–0.01)
NRBC BLD-RTO: 0 PER 100 WBC
PLATELET # BLD AUTO: 157 K/UL (ref 150–400)
PMV BLD AUTO: 10.6 FL (ref 8.9–12.9)
POTASSIUM SERPL-SCNC: 4 MMOL/L (ref 3.5–5.1)
PROT SERPL-MCNC: 7.5 G/DL (ref 6.4–8.2)
RBC # BLD AUTO: 3.84 M/UL (ref 3.8–5.2)
SODIUM SERPL-SCNC: 134 MMOL/L (ref 136–145)
TROPONIN I SERPL-MCNC: <0.05 NG/ML
WBC # BLD AUTO: 6.7 K/UL (ref 3.6–11)

## 2020-04-27 PROCEDURE — 80053 COMPREHEN METABOLIC PANEL: CPT

## 2020-04-27 PROCEDURE — 71045 X-RAY EXAM CHEST 1 VIEW: CPT

## 2020-04-27 PROCEDURE — 84484 ASSAY OF TROPONIN QUANT: CPT

## 2020-04-27 PROCEDURE — 99285 EMERGENCY DEPT VISIT HI MDM: CPT

## 2020-04-27 PROCEDURE — 93005 ELECTROCARDIOGRAM TRACING: CPT

## 2020-04-27 PROCEDURE — 85025 COMPLETE CBC W/AUTO DIFF WBC: CPT

## 2020-04-27 PROCEDURE — 96374 THER/PROPH/DIAG INJ IV PUSH: CPT

## 2020-04-27 PROCEDURE — 36415 COLL VENOUS BLD VENIPUNCTURE: CPT

## 2020-04-27 RX ORDER — FAMOTIDINE 10 MG/ML
20 INJECTION INTRAVENOUS
Status: COMPLETED | OUTPATIENT
Start: 2020-04-27 | End: 2020-04-28

## 2020-04-28 VITALS
BODY MASS INDEX: 31 KG/M2 | SYSTOLIC BLOOD PRESSURE: 152 MMHG | RESPIRATION RATE: 16 BRPM | DIASTOLIC BLOOD PRESSURE: 74 MMHG | OXYGEN SATURATION: 96 % | WEIGHT: 186.07 LBS | HEART RATE: 75 BPM | HEIGHT: 65 IN | TEMPERATURE: 98.3 F

## 2020-04-28 LAB
APPEARANCE UR: CLEAR
ATRIAL RATE: 79 BPM
ATRIAL RATE: 84 BPM
BACTERIA URNS QL MICRO: NEGATIVE /HPF
BILIRUB UR QL: NEGATIVE
CALCULATED P AXIS, ECG09: 63 DEGREES
CALCULATED P AXIS, ECG09: 65 DEGREES
CALCULATED R AXIS, ECG10: 14 DEGREES
CALCULATED R AXIS, ECG10: 32 DEGREES
CALCULATED T AXIS, ECG11: 37 DEGREES
CALCULATED T AXIS, ECG11: 62 DEGREES
CK SERPL-CCNC: 106 U/L (ref 26–192)
COLOR UR: ABNORMAL
DIAGNOSIS, 93000: NORMAL
DIAGNOSIS, 93000: NORMAL
EPITH CASTS URNS QL MICRO: ABNORMAL /LPF
GLUCOSE UR STRIP.AUTO-MCNC: 500 MG/DL
HGB UR QL STRIP: NEGATIVE
HYALINE CASTS URNS QL MICRO: ABNORMAL /LPF (ref 0–5)
KETONES UR QL STRIP.AUTO: NEGATIVE MG/DL
LEUKOCYTE ESTERASE UR QL STRIP.AUTO: NEGATIVE
LIPASE SERPL-CCNC: 218 U/L (ref 73–393)
NITRITE UR QL STRIP.AUTO: NEGATIVE
P-R INTERVAL, ECG05: 144 MS
P-R INTERVAL, ECG05: 150 MS
PH UR STRIP: 6 [PH] (ref 5–8)
PROT UR STRIP-MCNC: NEGATIVE MG/DL
Q-T INTERVAL, ECG07: 358 MS
Q-T INTERVAL, ECG07: 398 MS
QRS DURATION, ECG06: 80 MS
QRS DURATION, ECG06: 82 MS
QTC CALCULATION (BEZET), ECG08: 423 MS
QTC CALCULATION (BEZET), ECG08: 456 MS
RBC #/AREA URNS HPF: ABNORMAL /HPF (ref 0–5)
SP GR UR REFRACTOMETRY: 1.01 (ref 1–1.03)
TROPONIN I SERPL-MCNC: <0.05 NG/ML
UA: UC IF INDICATED,UAUC: ABNORMAL
UROBILINOGEN UR QL STRIP.AUTO: 0.2 EU/DL (ref 0.2–1)
VENTRICULAR RATE, ECG03: 79 BPM
VENTRICULAR RATE, ECG03: 84 BPM
WBC URNS QL MICRO: ABNORMAL /HPF (ref 0–4)

## 2020-04-28 PROCEDURE — 74011250636 HC RX REV CODE- 250/636: Performed by: EMERGENCY MEDICINE

## 2020-04-28 PROCEDURE — 93005 ELECTROCARDIOGRAM TRACING: CPT

## 2020-04-28 PROCEDURE — 81001 URINALYSIS AUTO W/SCOPE: CPT

## 2020-04-28 PROCEDURE — 83690 ASSAY OF LIPASE: CPT

## 2020-04-28 PROCEDURE — 82550 ASSAY OF CK (CPK): CPT

## 2020-04-28 PROCEDURE — 96374 THER/PROPH/DIAG INJ IV PUSH: CPT

## 2020-04-28 PROCEDURE — 84484 ASSAY OF TROPONIN QUANT: CPT

## 2020-04-28 PROCEDURE — 74011250637 HC RX REV CODE- 250/637: Performed by: EMERGENCY MEDICINE

## 2020-04-28 PROCEDURE — 36415 COLL VENOUS BLD VENIPUNCTURE: CPT

## 2020-04-28 PROCEDURE — 74011000250 HC RX REV CODE- 250: Performed by: EMERGENCY MEDICINE

## 2020-04-28 RX ORDER — FAMOTIDINE 20 MG/1
20 TABLET, FILM COATED ORAL 2 TIMES DAILY
Qty: 20 TAB | Refills: 0 | Status: SHIPPED | OUTPATIENT
Start: 2020-04-28

## 2020-04-28 RX ADMIN — FAMOTIDINE 20 MG: 10 INJECTION, SOLUTION INTRAVENOUS at 00:07

## 2020-04-28 RX ADMIN — LIDOCAINE HYDROCHLORIDE 40 ML: 20 SOLUTION ORAL; TOPICAL at 00:07

## 2020-04-28 NOTE — ED PROVIDER NOTES
EMERGENCY DEPARTMENT HISTORY AND PHYSICAL EXAM      Date: 4/27/2020  Patient Name: Edouard Montelongo    History of Presenting Illness     Chief Complaint   Patient presents with    Chest Pain     midsternal chest pain x 2 hours; reports that sx's started after she took 2 Tylenol PM and her zoloft for the headache she was experiencing    Shortness of Breath       History Provided By: Patient    HPI: Edouard Montelongo, 77 y.o. female with PMHx significant for hypertension, asthma, depression with anxiety, and type 2 diabetes presents to the ED with chief complaint of substernal chest pressure that started about 830 tonight. Patient had a headache just prior to the chest pressure starting and took 2 Tylenol PM's. She did then developed shortness of breath and chest pressure that felt like an elephant sitting on her chest.  She ate pot roast and potatoes about an hour and a half before her symptoms started. She tried to eat a [place] bar to [employer] it down\" but was unable to swallow due to the pain. She denies any fevers or chills, but does report that she has had a cough for the past few days that has been nonproductive. She reports some shortness of breath earlier that has gotten better since she arrived in the ED but is not completely gone. She thinks that the residual shortness of breath is related to the mask that she is wearing. At the time of my interview her chest pressure has gone down significantly. She reports that it was 9-10 in intensity at home and is down to about a 3 here. She denies any recent travel. She denies any calf pain or leg swelling. 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 She denies any known exposure to covid-19 patients. Patient reports that she had similar episode about 5 or 6 years ago and had a cardiac cath that did not show any significant coronary artery disease.   Review of medical records shows that this cath was done in February 2013 by Dr. Marichuy Claudio. PCP: Mike Barclay MD    No current facility-administered medications on file prior to encounter. Current Outpatient Medications on File Prior to Encounter   Medication Sig Dispense Refill    lisinopril (PRINIVIL, ZESTRIL) 10 mg tablet Take 5 mg by mouth daily.  buPROPion XL (WELLBUTRIN XL) 300 mg XL tablet Take 300 mg by mouth every morning.  glimepiride (AMARYL) 2 mg tablet Take 2 mg by mouth every morning.  atorvastatin (LIPITOR) 20 mg tablet Take 20 mg by mouth daily.  aspirin delayed-release 81 mg tablet Take 81 mg by mouth daily.  pioglitazone (ACTOS) 15 mg tablet Take 15 mg by mouth daily.  dextromethorphan-guaiFENesin (ROBITUSSIN-DM)  mg/5 mL syrup Take 10 mL by mouth every four (4) hours as needed for Cough.  cholecalciferol (VITAMIN D3) 1,000 unit tablet Take 1,000 Units by mouth daily.  cyanocobalamin 1,000 mcg tablet Take 1,000 mcg by mouth daily.  albuterol-ipratropium (DUONEB) 2.5 mg-0.5 mg/3 ml nebulizer solution 3 mL by Nebulization route every four (4) hours as needed for Wheezing. 1 Package     ALPRAZolam (XANAX) 0.5 mg tablet Take 0.5 mg by mouth two (2) times daily as needed.  albuterol (PROVENTIL, VENTOLIN) 90 mcg/actuation inhaler Take 1-2 Puffs by inhalation every four (4) hours as needed for Wheezing. 17 g 1    sertraline (ZOLOFT) 100 mg tablet Take 150 mg by mouth daily.          Past History     Past Medical History:  Past Medical History:   Diagnosis Date    Asthma     [facility]ervical [facility] ([facility] [address] 0    COPD     Depression with anxiety     Diabetes ([facility] [address]     currently on no meds    Other and unspecified hyperlipidemia     Type II or unspecified type diabetes mellitus without mention of complication, uncontrolled     A1c 9.2 8/2012;  used to see Dr. Mikaela Sainz essential hypertension        Past Surgical History:  Past Surgical History:   Procedure Laterality Date    CARDIAC CATHETERIZATION  2/21/2013         HX APPENDECTOMY      HX CERVICAL FUSION      HX AURY AND BSO      for cervical cancer       Family History:  Family History   Problem Relation Age of Onset    Heart Disease Neg Hx     Stroke Neg Hx        Social History:  Social History     Tobacco Use    Smoking status: Never Smoker    Smokeless tobacco: Never Used    Tobacco comment: exposed to second hand smoking in past   Substance Use Topics    Alcohol use: No     Comment: rarely a mixed drink    Drug use: No       Allergies: Allergies   Allergen Reactions    Metformin Nausea and Vomiting         Review of Systems   Review of Systems   Constitutional: Negative for chills and fever. HENT: Negative for congestion, ear pain, rhinorrhea and sore throat. Eyes: Negative. Respiratory: Positive for cough, chest tightness and shortness of breath. Negative for wheezing. Cardiovascular: Positive for chest pain. Negative for palpitations and leg swelling. Gastrointestinal: Negative for abdominal pain, constipation, diarrhea, nausea and vomiting. Genitourinary: Negative for dysuria, flank pain and hematuria. Musculoskeletal: Negative for back pain and myalgias. Skin: Negative for rash and wound. Neurological: Negative for dizziness, syncope, light-headedness and headaches. Psychiatric/Behavioral: Negative for confusion. The patient is not nervous/anxious. All other systems reviewed and are negative.         Physical Exam    General appearance -overweight, well appearing, and in no distress  Eyes - pupils equal and reactive, extraocular eye movements intact  ENT - mucous membranes moist, pharynx normal without lesions  Neck - supple, no significant adenopathy; non-tender to palpation  Chest - clear to auscultation, no wheezes, rales or rhonchi; non-tender to palpation  Heart - normal rate and regular rhythm, S1 and S2 normal, no murmurs noted  Abdomen - soft, nontender, nondistended, no masses or organomegaly  Musculoskeletal - no joint tenderness, deformity or swelling; normal ROM  Extremities - peripheral pulses normal, no pedal edema  Skin - normal coloration and turgor, no rashes  Neurological - alert, oriented x3, normal speech, no focal findings or movement disorder noted    Diagnostic Study Results     Labs -     Recent Results (from the past 12 hour(s))   EKG, 12 LEAD, INITIAL    Collection Time: 04/27/20 10:55 PM   Result Value Ref Range    Ventricular Rate 84 BPM    Atrial Rate 84 BPM    P-R Interval 144 ms    QRS Duration 82 ms    Q-T Interval 358 ms    QTC Calculation (Bezet) 423 ms    Calculated P Axis 63 degrees    Calculated R Axis 32 degrees    Calculated T Axis 62 degrees    Diagnosis       Normal sinus rhythm  Normal ECG  When compared with ECG of 10-MAY-2018 07:07,  premature ventricular complexes are no longer present     CBC WITH AUTOMATED DIFF    Collection Time: 04/27/20 11:10 PM   Result Value Ref Range    WBC 6.7 3.6 - 11.0 K/uL    RBC 3.84 3.80 - 5.20 M/uL    HGB 11.5 11.5 - 16.0 g/dL    HCT 35.1 35.0 - 47.0 %    MCV 91.4 80.0 - 99.0 FL    MCH 29.9 26.0 - 34.0 PG    MCHC 32.8 30.0 - 36.5 g/dL    RDW 12.4 11.5 - 14.5 %    PLATELET 989 907 - 305 K/uL    MPV 10.6 8.9 - 12.9 FL    NRBC 0.0 0  WBC    ABSOLUTE NRBC 0.00 0.00 - 0.01 K/uL    NEUTROPHILS 54 32 - 75 %    LYMPHOCYTES 35 12 - 49 %    MONOCYTES 7 5 - 13 %    EOSINOPHILS 3 0 - 7 %    BASOPHILS 1 0 - 1 %    IMMATURE GRANULOCYTES 0 0.0 - 0.5 %    ABS. NEUTROPHILS 3.6 1.8 - 8.0 K/UL    ABS. LYMPHOCYTES 2.3 0.8 - 3.5 K/UL    ABS. MONOCYTES 0.5 0.0 - 1.0 K/UL    ABS. EOSINOPHILS 0.2 0.0 - 0.4 K/UL    ABS. BASOPHILS 0.0 0.0 - 0.1 K/UL    ABS. IMM.  GRANS. 0.0 0.00 - 0.04 K/UL    DF AUTOMATED     METABOLIC PANEL, COMPREHENSIVE    Collection Time: 04/27/20 11:10 PM   Result Value Ref Range    Sodium 134 (L) 136 - 145 mmol/L    Potassium 4.0 3.5 - 5.1 mmol/L    Chloride 102 97 - 108 mmol/L    CO2 28 21 - 32 mmol/L    Anion gap 4 (L) 5 - 15 mmol/L    Glucose 312 (H) 65 - 100 mg/dL    BUN 17 6 - 20 MG/DL    Creatinine 1.23 (H) 0.55 - 1.02 MG/DL    BUN/Creatinine ratio 14 12 - 20      GFR est AA 52 (L) >60 ml/min/1.73m2    GFR est non-AA 43 (L) >60 ml/min/1.73m2    Calcium 9.3 8.5 - 10.1 MG/DL    Bilirubin, total 0.7 0.2 - 1.0 MG/DL    ALT (SGPT) 38 12 - 78 U/L    AST (SGOT) 29 15 - 37 U/L    Alk. phosphatase 76 45 - 117 U/L    Protein, total 7.5 6.4 - 8.2 g/dL    Albumin 3.7 3.5 - 5.0 g/dL    Globulin 3.8 2.0 - 4.0 g/dL    A-G Ratio 1.0 (L) 1.1 - 2.2     TROPONIN I    Collection Time: 04/27/20 11:10 PM   Result Value Ref Range    Troponin-I, Qt. <0.05 <0.05 ng/mL   URINALYSIS W/ REFLEX CULTURE    Collection Time: 04/28/20 12:11 AM   Result Value Ref Range    Color YELLOW/STRAW      Appearance CLEAR CLEAR      Specific gravity 1.014 1.003 - 1.030      pH (UA) 6.0 5.0 - 8.0      Protein Negative NEG mg/dL    Glucose 500 (A) NEG mg/dL    Ketone Negative NEG mg/dL    Bilirubin Negative NEG      Blood Negative NEG      Urobilinogen 0.2 0.2 - 1.0 EU/dL    Nitrites Negative NEG      Leukocyte Esterase Negative NEG      WBC 0-4 0 - 4 /hpf    RBC 0-5 0 - 5 /hpf    Epithelial cells FEW FEW /lpf    Bacteria Negative NEG /hpf    UA:UC IF INDICATED CULTURE NOT INDICATED BY UA RESULT CNI      Hyaline cast 0-2 0 - 5 /lpf   CK    Collection Time: 04/28/20  1:23 AM   Result Value Ref Range     26 - 192 U/L   TROPONIN I    Collection Time: 04/28/20  1:23 AM   Result Value Ref Range    Troponin-I, Qt. <0.05 <0.05 ng/mL   LIPASE    Collection Time: 04/28/20  1:23 AM   Result Value Ref Range    Lipase 218 73 - 393 U/L       Radiologic Studies -   XR CHEST PORT   Final Result   IMPRESSION:   No acute process. CT Results  (Last 48 hours)    None        CXR Results  (Last 48 hours)               04/27/20 2321  XR CHEST PORT Final result    Impression:  IMPRESSION:   No acute process.                         Narrative:  PORTABLE CHEST RADIOGRAPH/S: 4/27/2020 11:21 PM       Clinical history: CP   INDICATION:   CP   COMPARISON: 1/22/2020       FINDINGS:   AP portable upright view of the chest demonstrates a stable  cardiopericardial   silhouette. The lungs are adequately expanded. There is no edema, effusion,   consolidation, or pneumothorax. The osseous structures are unremarkable. Patient   is on a cardiac monitor. Medical Decision Making   I am the first provider for this patient. I reviewed the vital signs, available nursing notes, past medical history, past surgical history, family history and social history. Vital Signs-Reviewed the patient's vital signs. Patient Vitals for the past 12 hrs:   Temp Pulse Resp BP SpO2   04/28/20 0245  75 16 152/74 96 %   04/28/20 0230  75 17 158/74 96 %   04/28/20 0200  78 17 163/74 96 %   04/28/20 0145  76 15 149/71 96 %   04/28/20 0130  92 16 169/77 97 %   04/28/20 0115  77 17 157/65 96 %   04/28/20 0100  80 17 157/77 96 %   04/28/20 0046  85 17 149/68 96 %   04/28/20 0030  82 19 132/81 97 %   04/28/20 0013  86 17 164/76 97 %   04/27/20 2330  85 17  97 %   04/27/20 2302     98 %   04/27/20 2247 98.3 °F (36.8 °C) 85 24 (!) 134/102 99 %       EKG: Normal sinus rhythm, 84 bpm, normal axis, normal SD, QRS, QTc intervals, no ischemic changes    Records Reviewed: Nursing Notes and Old Medical Records    Provider Notes (Medical Decision Making):   Differential diagnosis: GERD, acute coronary syndrome, pneumonia, congestive heart failure, chest wall strain  We will check CBC, CMP, serial cardiac enzymes, chest x-ray. Will treat with GI cocktail and Pepcid. ED Course:   Initial assessment performed. The patients presenting problems have been discussed, and they are in agreement with the care plan formulated and outlined with them. I have encouraged them to ask questions as they arise throughout their visit.     Progress Notes:     Patient is feeling better and symptoms have resolved. 2 sets of cardiac enzymes are negative. Subsequent EKG is reassuring. Chest x-ray does not show any abnormalities. Patient is ready for discharge. I have been encouraged her to follow-up with Dr. Shawn Nolan for possible outpatient stress test.  Disposition:  [place] home    PLAN:  1. Discharge Medication List as of 4/28/2020  2:47 AM      START taking these medications    Details   famotidine (Pepcid) 20 mg tablet Take 1 Tab by mouth two (2) times a day., Print, Disp-20 Tab, R-0         CONTINUE these medications which have NOT CHANGED    Details   lisinopril (PRINIVIL, ZESTRIL) 10 mg tablet Take 5 mg by mouth daily. , Historical Med      buPROPion XL (WELLBUTRIN XL) 300 mg XL tablet Take 300 mg by mouth every morning., Historical Med      glimepiride (AMARYL) 2 mg tablet Take 2 mg by mouth every morning., Historical Med      atorvastatin (LIPITOR) 20 mg tablet Take 20 mg by mouth daily. , Historical Med      aspirin delayed-release 81 mg tablet Take 81 mg by mouth daily. , Historical Med      pioglitazone (ACTOS) 15 mg tablet Take 15 mg by mouth daily. , Historical Med      dextromethorphan-guaiFENesin (ROBITUSSIN-DM)  mg/5 mL syrup Take 10 mL by mouth every four (4) hours as needed for Cough., Historical Med      cholecalciferol (VITAMIN D3) 1,000 unit tablet Take 1,000 Units by mouth daily. , Historical Med      cyanocobalamin 1,000 mcg tablet Take 1,000 mcg by mouth daily. , Historical Med      albuterol-ipratropium (DUONEB) 2.5 mg-0.5 mg/3 ml nebulizer solution 3 mL by Nebulization route every four (4) hours as needed for Wheezing., Historical Med, Disp-1 Package      ALPRAZolam (XANAX) 0.5 mg tablet Take 0.5 mg by mouth two (2) times daily as needed., Historical Med      albuterol (PROVENTIL, VENTOLIN) 90 mcg/actuation inhaler Take 1-2 Puffs by inhalation every four (4) hours as needed for Wheezing., Print, Disp-17 g, R-1      sertraline (ZOLOFT) 100 mg tablet Take 150 mg by mouth daily. , Historical Med           2. Follow-up Information     Follow up With Specialties Details Why Contact Info    [facility] EMERGENCY DEPT Emergency Medicine  If symptoms worsen [address] [facility] [address]    Umm Magaña MD Cardiology, [address] [facility] Vascular Surgery, Internal Medicine Schedule an appointment as soon as possible for a visit  [place] [phone]          Return to ED if worse     Diagnosis     Clinical Impression:   1.  Acute chest pain

## 2020-04-29 ENCOUNTER — PATIENT OUTREACH (OUTPATIENT)
Dept: INTERNAL MEDICINE CLINIC | Age: 75
End: 2020-04-29

## 2020-04-29 NOTE — PROGRESS NOTES
Patient contacted regarding recent discharge and COVID-19 risk   Care Transition Nurse/ Ambulatory Care Manager contacted the patient by telephone to perform post discharge assessment; lvm requesting a return phone call to this ACM. Patient has following risk factors of: COPD, asthma and diabetes. Rx - Pepcid.     Pt was advised to f/u with Dr. Merry Cavazos (Cardiology) as outpatient for possible stress test.

## 2020-04-30 NOTE — PROGRESS NOTES
2020  2:52 PM    Patient contacted regarding recent discharge and COVID-19 risk   Care Transition Nurse/ Ambulatory Care Manager contacted the patient by telephone to perform post discharge assessment. Verified name and  with patient as identifiers. Patient has following risk factors of: COPD, asthma and diabetes. CTN/ACM reviewed discharge instructions, medical action plan and red flags related to discharge diagnosis. Reviewed and educated them on any new and changed medications related to discharge diagnosis; Rx - Pepcid. Advised obtaining a 90-day supply of all daily and as-needed medications. Education provided regarding infection prevention, and signs and symptoms of COVID-19 and when to seek medical attention with patient who verbalized understanding. Discussed exposure protocols and quarantine from [address] you at higher risk for severe illness  and given an opportunity for questions and concerns. The patient agrees to contact the COVID-19 hotline [phone] or PCP office for questions related to their healthcare. CTN/ACM provided contact information for future reference. From CDC: Are you at higher risk for severe illness?  Wash your hands often.  Avoid close contact (6 feet, which is about two arm lengths) with people who are sick.  Put distance between yourself and other people if COVID-19 is spreading in your community.  Clean and disinfect frequently touched surfaces.  Avoid all cruise travel and non-essential air travel.  Call your healthcare professional if you have concerns about COVID-19 and your underlying condition or if you are sick. For more information on steps you can take to protect yourself, see CDC's How to Protect Yourself      Patient/family/caregiver given information for Tania Nguyen and agrees to enroll yes  Patient's preferred e-mail:  [email or web address]   Patient's preferred phone number: [phone]  Based on Loop alert triggers, patient will be contacted by nurse care manager for worsening symptoms. Pt was advised to f/u with Dr. Kathe Robbins (Cardiology) as outpatient for possible stress test. Patient is followed by non-[facility] PCP. Patient has not yet scheduled her outpatient follow-up with PCP and/or Cardiology. Patient is advised to contact both PCP and Cardiology to schedule an appointment. Pt will be further monitored by COVID Loop Team based on severity of symptoms and risk factors.

## 2020-06-03 ENCOUNTER — HOSPITAL ENCOUNTER (OUTPATIENT)
Dept: CT IMAGING | Age: 75
Discharge: HOME | End: 2020-06-03
Attending: INTERNAL MEDICINE
Payer: MEDICARE

## 2020-06-03 DIAGNOSIS — R91.1 PULMONARY NODULE: ICD-10-CM

## 2020-06-03 PROCEDURE — 71250 CT THORAX DX C-: CPT

## 2021-01-04 ENCOUNTER — HOSPITAL ENCOUNTER (INPATIENT)
Age: 76
LOS: 3 days | Discharge: HOME | DRG: 281 | End: 2021-01-07
Attending: STUDENT IN AN ORGANIZED HEALTH CARE EDUCATION/TRAINING PROGRAM | Admitting: STUDENT IN AN ORGANIZED HEALTH CARE EDUCATION/TRAINING PROGRAM
Payer: MEDICARE

## 2021-01-04 ENCOUNTER — APPOINTMENT (OUTPATIENT)
Dept: GENERAL RADIOLOGY | Age: 76
DRG: 281 | End: 2021-01-04
Attending: STUDENT IN AN ORGANIZED HEALTH CARE EDUCATION/TRAINING PROGRAM
Payer: MEDICARE

## 2021-01-04 DIAGNOSIS — I51.81 TAKOTSUBO CARDIOMYOPATHY: ICD-10-CM

## 2021-01-04 DIAGNOSIS — I10 UNSPECIFIED ESSENTIAL HYPERTENSION: ICD-10-CM
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
## 2021-01-04 DIAGNOSIS — J45.901 UNSPECIFIED ASTHMA, WITH EXACERBATION: ICD-10-CM

## 2021-01-04 DIAGNOSIS — E78.5: ICD-10-CM

## 2021-01-04 DIAGNOSIS — J96.01 ACUTE RESPIRATORY FAILURE WITH HYPOXIA (HCC): ICD-10-CM

## 2021-01-04 DIAGNOSIS — J45.901 ASTHMA WITH ACUTE EXACERBATION, UNSPECIFIED ASTHMA SEVERITY, UNSPECIFIED WHETHER PERSISTENT: ICD-10-CM

## 2021-01-04 DIAGNOSIS — I25.118 CORONARY ARTERY DISEASE OF NATIVE ARTERY OF NATIVE HEART WITH STABLE ANGINA PECTORIS (HCC): ICD-10-CM

## 2021-01-04 DIAGNOSIS — I21.4 NSTEMI (NON-ST ELEVATED MYOCARDIAL INFARCTION) (HCC): Primary | ICD-10-CM

## 2021-01-04 PROBLEM — R65.10 SIRS (SYSTEMIC INFLAMMATORY RESPONSE SYNDROME) (HCC): Status: ACTIVE | Noted: 2021-01-04

## 2021-01-04 LAB
ALBUMIN SERPL-MCNC: 3.5 G/DL (ref 3.5–5)
ALBUMIN/GLOB SERPL: 0.9 {RATIO} (ref 1.1–2.2)
ALP SERPL-CCNC: 83 U/L (ref 45–117)
ALT SERPL-CCNC: 27 U/L (ref 12–78)
ANION GAP SERPL CALC-SCNC: 5 MMOL/L (ref 5–15)
APTT PPP: <20 SEC (ref 22.1–31)
AST SERPL-CCNC: 28 U/L (ref 15–37)
ATRIAL RATE: 113 BPM
ATRIAL RATE: 123 BPM
BASOPHILS # BLD: 0 K/UL (ref 0–0.1)
BASOPHILS NFR BLD: 0 % (ref 0–1)
BILIRUB SERPL-MCNC: 0.7 MG/DL (ref 0.2–1)
BNP SERPL-MCNC: 266 PG/ML
BUN SERPL-MCNC: 20 MG/DL (ref 6–20)
BUN/CREAT SERPL: 20 (ref 12–20)
CALCIUM SERPL-MCNC: 8.8 MG/DL (ref 8.5–10.1)
CALCULATED P AXIS, ECG09: 30 DEGREES
CALCULATED P AXIS, ECG09: 55 DEGREES
CALCULATED R AXIS, ECG10: 21 DEGREES
CALCULATED R AXIS, ECG10: 29 DEGREES
CALCULATED T AXIS, ECG11: 47 DEGREES
CALCULATED T AXIS, ECG11: 67 DEGREES
CHLORIDE SERPL-SCNC: 104 MMOL/L (ref 97–108)
CO2 SERPL-SCNC: 27 MMOL/L (ref 21–32)
COMMENT, HOLDF: NORMAL
COVID-19 RAPID TEST, COVR: NOT DETECTED
CREAT SERPL-MCNC: 0.98 MG/DL (ref 0.55–1.02)
DIAGNOSIS, 93000: NORMAL
DIAGNOSIS, 93000: NORMAL
DIFFERENTIAL METHOD BLD: ABNORMAL
EOSINOPHIL # BLD: 0.1 K/UL (ref 0–0.4)
EOSINOPHIL NFR BLD: 1 % (ref 0–7)
ERYTHROCYTE [DISTWIDTH] IN BLOOD BY AUTOMATED COUNT: 12.3 % (ref 11.5–14.5)
GLOBULIN SER CALC-MCNC: 3.7 G/DL (ref 2–4)
GLUCOSE BLD STRIP.AUTO-MCNC: 315 MG/DL (ref 65–100)
GLUCOSE SERPL-MCNC: 248 MG/DL (ref 65–100)
HCT VFR BLD AUTO: 36.5 % (ref 35–47)
HEALTH STATUS, XMCV2T: NORMAL
HGB BLD-MCNC: 11.9 G/DL (ref 11.5–16)
IMM GRANULOCYTES # BLD AUTO: 0.1 K/UL (ref 0–0.04)
IMM GRANULOCYTES NFR BLD AUTO: 1 % (ref 0–0.5)
INR PPP: 1 (ref 0.9–1.1)
LYMPHOCYTES # BLD: 1.4 K/UL (ref 0.8–3.5)
LYMPHOCYTES NFR BLD: 15 % (ref 12–49)
MCH RBC QN AUTO: 30.2 PG (ref 26–34)
MCHC RBC AUTO-ENTMCNC: 32.6 G/DL (ref 30–36.5)
MCV RBC AUTO: 92.6 FL (ref 80–99)
MONOCYTES # BLD: 0.5 K/UL (ref 0–1)
MONOCYTES NFR BLD: 6 % (ref 5–13)
NEUTS SEG # BLD: 7.1 K/UL (ref 1.8–8)
NEUTS SEG NFR BLD: 77 % (ref 32–75)
NRBC # BLD: 0 K/UL (ref 0–0.01)
NRBC BLD-RTO: 0 PER 100 WBC
P-R INTERVAL, ECG05: 104 MS
P-R INTERVAL, ECG05: 158 MS
PLATELET # BLD AUTO: 151 K/UL (ref 150–400)
PMV BLD AUTO: 10.8 FL (ref 8.9–12.9)
POTASSIUM SERPL-SCNC: 3.6 MMOL/L (ref 3.5–5.1)
PROT SERPL-MCNC: 7.2 G/DL (ref 6.4–8.2)
PROTHROMBIN TIME: 10.7 SEC (ref 9–11.1)
Q-T INTERVAL, ECG07: 340 MS
Q-T INTERVAL, ECG07: 416 MS
QRS DURATION, ECG06: 84 MS
QRS DURATION, ECG06: 84 MS
QTC CALCULATION (BEZET), ECG08: 466 MS
QTC CALCULATION (BEZET), ECG08: 595 MS
RBC # BLD AUTO: 3.94 M/UL (ref 3.8–5.2)
SAMPLES BEING HELD,HOLD: NORMAL
SERVICE CMNT-IMP: ABNORMAL
SODIUM SERPL-SCNC: 136 MMOL/L (ref 136–145)
SOURCE, COVRS: NORMAL
SPECIMEN SOURCE, FCOV2M: NORMAL
SPECIMEN TYPE, XMCV1T: NORMAL
THERAPEUTIC RANGE,PTTT: ABNORMAL SECS (ref 58–77)
TROPONIN I SERPL-MCNC: 0.87 NG/ML
TROPONIN I SERPL-MCNC: 1.59 NG/ML
VENTRICULAR RATE, ECG03: 113 BPM
VENTRICULAR RATE, ECG03: 123 BPM
WBC # BLD AUTO: 9.1 K/UL (ref 3.6–11)

## 2021-01-04 PROCEDURE — 71045 X-RAY EXAM CHEST 1 VIEW: CPT

## 2021-01-04 PROCEDURE — 94640 AIRWAY INHALATION TREATMENT: CPT

## 2021-01-04 PROCEDURE — 93005 ELECTROCARDIOGRAM TRACING: CPT

## 2021-01-04 PROCEDURE — 99285 EMERGENCY DEPT VISIT HI MDM: CPT

## 2021-01-04 PROCEDURE — 85610 PROTHROMBIN TIME: CPT

## 2021-01-04 PROCEDURE — 74011000250 HC RX REV CODE- 250: Performed by: STUDENT IN AN ORGANIZED HEALTH CARE EDUCATION/TRAINING PROGRAM

## 2021-01-04 PROCEDURE — 74011636637 HC RX REV CODE- 636/637: Performed by: STUDENT IN AN ORGANIZED HEALTH CARE EDUCATION/TRAINING PROGRAM

## 2021-01-04 PROCEDURE — 82962 GLUCOSE BLOOD TEST: CPT

## 2021-01-04 PROCEDURE — 85730 THROMBOPLASTIN TIME PARTIAL: CPT

## 2021-01-04 PROCEDURE — 74011250637 HC RX REV CODE- 250/637: Performed by: INTERNAL MEDICINE

## 2021-01-04 PROCEDURE — 73110 X-RAY EXAM OF WRIST: CPT

## 2021-01-04 PROCEDURE — 74011250636 HC RX REV CODE- 250/636: Performed by: STUDENT IN AN ORGANIZED HEALTH CARE EDUCATION/TRAINING PROGRAM

## 2021-01-04 PROCEDURE — 65660000000 HC RM CCU STEPDOWN

## 2021-01-04 PROCEDURE — 85025 COMPLETE CBC W/AUTO DIFF WBC: CPT

## 2021-01-04 PROCEDURE — 80053 COMPREHEN METABOLIC PANEL: CPT

## 2021-01-04 PROCEDURE — 84484 ASSAY OF TROPONIN QUANT: CPT

## 2021-01-04 PROCEDURE — 83880 ASSAY OF NATRIURETIC PEPTIDE: CPT

## 2021-01-04 PROCEDURE — 36415 COLL VENOUS BLD VENIPUNCTURE: CPT

## 2021-01-04 PROCEDURE — 74011250637 HC RX REV CODE- 250/637: Performed by: STUDENT IN AN ORGANIZED HEALTH CARE EDUCATION/TRAINING PROGRAM

## 2021-01-04 PROCEDURE — 87635 SARS-COV-2 COVID-19 AMP PRB: CPT

## 2021-01-04 RX ORDER — MAGNESIUM SULFATE 100 %
4 CRYSTALS MISCELLANEOUS AS NEEDED
Status: DISCONTINUED | OUTPATIENT
Start: 2021-01-04 | End: 2021-01-07 | Stop reason: HOSPADM

## 2021-01-04 RX ORDER — PREDNISONE 20 MG/1
60 TABLET ORAL
Status: COMPLETED | OUTPATIENT
Start: 2021-01-04 | End: 2021-01-04

## 2021-01-04 RX ORDER — NITROGLYCERIN 0.4 MG/1
0.4 TABLET SUBLINGUAL
Status: DISCONTINUED | OUTPATIENT
Start: 2021-01-04 | End: 2021-01-07 | Stop reason: HOSPADM

## 2021-01-04 RX ORDER — IPRATROPIUM BROMIDE AND ALBUTEROL SULFATE 2.5; .5 MG/3ML; MG/3ML
3 SOLUTION RESPIRATORY (INHALATION)
Status: COMPLETED | OUTPATIENT
Start: 2021-01-04 | End: 2021-01-04

## 2021-01-04 RX ORDER — BUPROPION HYDROCHLORIDE 150 MG/1
300 TABLET ORAL DAILY
Status: DISCONTINUED | OUTPATIENT
Start: 2021-01-05 | End: 2021-01-07 | Stop reason: HOSPADM

## 2021-01-04 RX ORDER — ALBUTEROL SULFATE 90 UG/1
1-2 AEROSOL, METERED RESPIRATORY (INHALATION)
Status: DISCONTINUED | OUTPATIENT
Start: 2021-01-04 | End: 2021-01-07 | Stop reason: HOSPADM

## 2021-01-04 RX ORDER — BUDESONIDE 0.5 MG/2ML
500 INHALANT ORAL
Status: DISCONTINUED | OUTPATIENT
Start: 2021-01-04 | End: 2021-01-07 | Stop reason: HOSPADM

## 2021-01-04 RX ORDER — SERTRALINE HYDROCHLORIDE 50 MG/1
150 TABLET, FILM COATED ORAL DAILY
Status: DISCONTINUED | OUTPATIENT
Start: 2021-01-05 | End: 2021-01-07 | Stop reason: HOSPADM

## 2021-01-04 RX ORDER — METOPROLOL TARTRATE 25 MG/1
25 TABLET, FILM COATED ORAL EVERY 12 HOURS
Status: DISCONTINUED | OUTPATIENT
Start: 2021-01-04 | End: 2021-01-05

## 2021-01-04 RX ORDER — INSULIN LISPRO 100 [IU]/ML
INJECTION, SOLUTION INTRAVENOUS; SUBCUTANEOUS
Status: DISCONTINUED | OUTPATIENT
Start: 2021-01-04 | End: 2021-01-06

## 2021-01-04 RX ORDER — HEPARIN SODIUM 10000 [USP'U]/100ML
12-25 INJECTION, SOLUTION INTRAVENOUS
Status: DISCONTINUED | OUTPATIENT
Start: 2021-01-04 | End: 2021-01-05

## 2021-01-04 RX ORDER — DEXTROSE 50 % IN WATER (D50W) INTRAVENOUS SYRINGE
12.5-25 AS NEEDED
Status: DISCONTINUED | OUTPATIENT
Start: 2021-01-04 | End: 2021-01-07 | Stop reason: HOSPADM

## 2021-01-04 RX ORDER — HEPARIN SODIUM 5000 [USP'U]/ML
4000 INJECTION, SOLUTION INTRAVENOUS; SUBCUTANEOUS ONCE
Status: COMPLETED | OUTPATIENT
Start: 2021-01-04 | End: 2021-01-04

## 2021-01-04 RX ORDER — IPRATROPIUM BROMIDE AND ALBUTEROL SULFATE 2.5; .5 MG/3ML; MG/3ML
3 SOLUTION RESPIRATORY (INHALATION) ONCE
Status: COMPLETED | OUTPATIENT
Start: 2021-01-04 | End: 2021-01-04

## 2021-01-04 RX ORDER — ATORVASTATIN CALCIUM 20 MG/1
20 TABLET, FILM COATED ORAL DAILY
Status: DISCONTINUED | OUTPATIENT
Start: 2021-01-05 | End: 2021-01-07 | Stop reason: HOSPADM

## 2021-01-04 RX ORDER — HEPARIN SODIUM 5000 [USP'U]/ML
2000 INJECTION, SOLUTION INTRAVENOUS; SUBCUTANEOUS AS NEEDED
Status: DISCONTINUED | OUTPATIENT
Start: 2021-01-05 | End: 2021-01-05

## 2021-01-04 RX ORDER — LANOLIN ALCOHOL/MO/W.PET/CERES
1000 CREAM (GRAM) TOPICAL DAILY
Status: DISCONTINUED | OUTPATIENT
Start: 2021-01-05 | End: 2021-01-07 | Stop reason: HOSPADM

## 2021-01-04 RX ORDER — HEPARIN SODIUM 5000 [USP'U]/ML
4000 INJECTION, SOLUTION INTRAVENOUS; SUBCUTANEOUS AS NEEDED
Status: DISCONTINUED | OUTPATIENT
Start: 2021-01-05 | End: 2021-01-05

## 2021-01-04 RX ORDER — ASPIRIN 325 MG
325 TABLET ORAL
Status: COMPLETED | OUTPATIENT
Start: 2021-01-04 | End: 2021-01-04

## 2021-01-04 RX ORDER — FAMOTIDINE 20 MG/1
20 TABLET, FILM COATED ORAL 2 TIMES DAILY
Status: DISCONTINUED | OUTPATIENT
Start: 2021-01-04 | End: 2021-01-06

## 2021-01-04 RX ORDER — BENZONATATE 100 MG/1
100 CAPSULE ORAL ONCE
Status: COMPLETED | OUTPATIENT
Start: 2021-01-04 | End: 2021-01-04

## 2021-01-04 RX ADMIN — PREDNISONE 60 MG: 20 TABLET ORAL at 16:10

## 2021-01-04 RX ADMIN — METOPROLOL TARTRATE 25 MG: 25 TABLET, FILM COATED ORAL at 22:49

## 2021-01-04 RX ADMIN — METHYLPREDNISOLONE SODIUM SUCCINATE 40 MG: 125 INJECTION, POWDER, FOR SOLUTION INTRAMUSCULAR; INTRAVENOUS at 22:48

## 2021-01-04 RX ADMIN — HEPARIN SODIUM 4000 UNITS: 5000 INJECTION INTRAVENOUS; SUBCUTANEOUS at 19:24

## 2021-01-04 RX ADMIN — FAMOTIDINE 20 MG: 20 TABLET, FILM COATED ORAL at 22:48

## 2021-01-04 RX ADMIN — HEPARIN SODIUM AND DEXTROSE 12 UNITS/KG/HR: 10000; 5 INJECTION INTRAVENOUS at 19:29

## 2021-01-04 RX ADMIN — ALBUTEROL SULFATE 2 PUFF: 90 AEROSOL, METERED RESPIRATORY (INHALATION) at 23:11

## 2021-01-04 RX ADMIN — BENZONATATE 100 MG: 100 CAPSULE ORAL at 18:48

## 2021-01-04 RX ADMIN — IPRATROPIUM BROMIDE AND ALBUTEROL SULFATE 3 ML: .5; 3 SOLUTION RESPIRATORY (INHALATION) at 16:13

## 2021-01-04 RX ADMIN — IPRATROPIUM BROMIDE AND ALBUTEROL SULFATE 3 ML: .5; 2.5 SOLUTION RESPIRATORY (INHALATION) at 16:28

## 2021-01-04 RX ADMIN — ASPIRIN 325 MG: 325 TABLET, FILM COATED ORAL at 18:17

## 2021-01-04 RX ADMIN — SODIUM CHLORIDE 500 ML: 9 INJECTION, SOLUTION INTRAVENOUS at 16:56

## 2021-01-04 RX ADMIN — INSULIN LISPRO 7 UNITS: 100 INJECTION, SOLUTION INTRAVENOUS; SUBCUTANEOUS at 23:04

## 2021-01-04 NOTE — ED NOTES
Pt ambulated to bathroom and returned to room by ER MD.  Found HR to be 120's, ER MD aware. Pt c/o chest pressure/tightness during ambulation to bathroom, did not inform RN or MD of this earlier.

## 2021-01-04 NOTE — Clinical Note
TRANSFER - OUT REPORT:     Verbal report given to: Ayesha RN. Report consisted of patient's Situation, Background, Assessment and   Recommendations(SBAR). Opportunity for questions and clarification was provided. Patient transported with a Registered Nurse and [address] [place] [address] / [facility] [facility]. Patient transported to: [facility].

## 2021-01-04 NOTE — ED TRIAGE NOTES
Pt was at store when she started having an asthma attack after she fell in the store. Pulse ox mid 70's for EMS upon arrival, given breathing tx en route and pulse ox 90's for EMS. Audible wheezing noted upon arrival but pt states she is feeling better.   Regarding fall pt states she \"simply\" tripped, c/o R elbow pain but note full ROM without worsening of pain and R hand pain but also note ROM to area of pain

## 2021-01-04 NOTE — PROGRESS NOTES
Pharmacy Heparin Monitoring    Indication: elevated troponin    Goal aPTT: 58-77 seconds    Initial dosing Weight: 81.2 kg    Labs:  Recent Labs     01/04/21  1655   HGB 11.9          Current rate:  0 unit/kg/hr    Impression/Plan:   New rate: 12 unit/kg/hr: initial rate  PRN bolus dose: Yes, include bolus dose: 4000 Units   Infusion rate, PRN bolus dose and aPTT results were discussed with the nurse: Farideh Choi RN     Thanks,  Mercedez Tyson, [address] [place] [address]    [email or web address]/[email or web address]/[email or web address]. pdf

## 2021-01-04 NOTE — ED PROVIDER NOTES
EMERGENCY DEPARTMENT HISTORY AND PHYSICAL EXAM          Date: 1/4/2021  Patient Name: Jeanne Zuleta  Attending of Record: Dr. Ling Phillips    Please note that this dictation was completed with [employer], the computer voice recognition software. Quite often unanticipated grammatical, syntax, homophones, and other interpretive errors are inadvertently transcribed by the computer software. Please disregard these errors. Please excuse any errors that have escaped final proofreading. History of Presenting Illness     Chief Complaint   Patient presents with    Wheezing       History Provided By: Patient    HPI: Jeanne Zuleta is a 77 y.o. female w/PMHx of asthma, COPD not on home O2, DMII, HTN, CAD, HLD, MDD, who presents for shortness of breath and wheezing over the last several weeks with acute worsening today after GLF. She states she has been using her rescue inhaler x4 daily for the past several weeks and nebulizer at night with increased cough. She had a mechanical fall over a curb while going shopping today and landing on her hands and knees. Endorses L wrist pain and mild swelling. Denies dizziness, LOC, HA, head trauma, neck pain, numbness, tingling, or weakness. Her asthma is often triggered by cold air. She states that she continued shopping but had worsening SOB and wheezing until she ultimately called EMS. Denied CP, fevers, productive cough, rhinorrhea, loss of taste or smell, abd pain, n/v/d/c, dysuria. Endorses medication compliance. PCP: Mike Barclay MD    There are no other complaints, changes, or physical findings at this time.      Current Facility-Administered Medications   Medication Dose Route Frequency Provider Last Rate Last Admin    metoprolol tartrate (LOPRESSOR) tablet 25 mg  25 mg Oral Q12H Jimy Persaud MD   25 mg at 01/04/21 2249    heparin 25,000 units in D5W 250 ml infusion  12-25 Units/kg/hr IntraVENous TITRATE Milton Orr MD 9.7 mL/hr at 01/04/21 1929 12 Units/kg/hr at 01/04/21 1929    nitroglycerin (NITROSTAT) tablet 0.4 mg  0.4 mg SubLINGual Q5MIN PRN Ying Jang MD        albuterol (PROVENTIL HFA, VENTOLIN HFA, PROAIR HFA) inhaler 1-2 Puff  1-2 Puff Inhalation Q4H RT Mary Khanna MD   2 Puff at 01/04/21 2311    [START ON 1/5/2021] atorvastatin (LIPITOR) tablet 20 mg  20 mg Oral DAILY Mary Khanna MD        [START ON 1/5/2021] buPROPion XL (WELLBUTRIN XL) tablet 300 mg  300 mg Oral DAILY Mary Khanna MD        [START ON 1/5/2021] sertraline (ZOLOFT) tablet 150 mg  150 mg Oral DAILY Mary Khanna MD       [address] [place] [address] ON 1/5/2021] cyanocobalamin (VITAMIN B12) tablet 1,000 mcg  1,000 mcg Oral DAILY Mary Khanna MD        famotidine (PEPCID) tablet 20 mg  20 mg Oral BID Mary Khanna MD   20 mg at 01/04/21 2248    methylPREDNISolone (PF) (Solu-MEDROL) injection 40 mg  40 mg IntraVENous Q8H Mary Khanna MD   40 mg at 01/04/21 2248    insulin lispro (HUMALOG) injection   SubCUTAneous AC&HS Mary Khanna MD   7 Units at 01/04/21 2304    glucose chewable tablet 16 g  4 Tab Oral PRN Mary Khanna MD        dextrose (D50W) injection syrg 12.5-25 g  12.5-25 g IntraVENous PRN Mary Khanna MD        glucagon (GLUCAGEN) injection 1 mg  1 mg IntraMUSCular PRN Mary Khanna MD       [address] [place] [address] [START ON 1/5/2021] heparin (porcine) injection 2,000 Units  2,000 Units IntraVENous PRN Ying Jang MD        Or   Nora Finn ON 1/5/2021] heparin (porcine) injection 4,000 Units  4,000 Units IntraVENous PRN Ying Jang MD        budesonide (PULMICORT) 500 mcg/2 ml nebulizer suspension  500 mcg Nebulization BID RT Mary Khanna MD           Past History     Past Medical History:  Past Medical History:   Diagnosis Date    Asthma     Cervical cancer ([address] 0    COPD     Depression with anxiety     Diabetes ([facility]     currently on no meds    Other and unspecified hyperlipidemia     Type II or unspecified type diabetes mellitus without mention of complication, uncontrolled     A1c 9.2 8/2012;  used to see Dr. Deanna Azlu essential hypertension        Past Surgical History:  Past Surgical History:   Procedure Laterality Date    CARDIAC CATHETERIZATION  2/21/2013         HX APPENDECTOMY      HX CERVICAL FUSION      HX AURY AND BSO      for cervical cancer       Family History:  Family History   Problem Relation Age of Onset    Heart Disease Neg Hx     Stroke Neg Hx        Social History:  Social History     Tobacco Use    Smoking status: Never Smoker    Smokeless tobacco: Never Used    Tobacco comment: exposed to second hand smoking in past   Substance Use Topics    Alcohol use: No     Comment: rarely a mixed drink    Drug use: No       Allergies: Allergies   Allergen Reactions    Metformin Nausea and Vomiting         Review of Systems   Review of Systems   Constitutional: Negative for chills and fever. HENT: Negative for congestion and sore throat. Eyes: Negative for visual disturbance. Respiratory: Positive for cough, shortness of breath and wheezing. Cardiovascular: Negative for chest pain, palpitations and leg swelling. Gastrointestinal: Negative for abdominal pain, blood in stool, diarrhea and nausea. Endocrine: Negative for polyuria. Genitourinary: Negative for dysuria and flank pain. Musculoskeletal: Positive for arthralgias. Negative for myalgias. Skin: Negative for rash and wound. Allergic/Immunologic: Negative for immunocompromised state. Neurological: Negative for dizziness, syncope, weakness, light-headedness, numbness and headaches. Psychiatric/Behavioral: Negative for confusion. Physical Exam   Physical Exam  Vitals signs and nursing note reviewed. Constitutional:       General: She is not in acute distress. Appearance: Normal appearance. She is well-developed. HENT:      Head: Normocephalic and atraumatic.       Mouth/Throat:      Mouth: Mucous membranes are moist.      Pharynx: Oropharynx is clear. Eyes:      Extraocular Movements: Extraocular movements intact. Pupils: Pupils are equal, round, and reactive to light. Cardiovascular:      Rate and Rhythm: Regular rhythm. Tachycardia present. Heart sounds: Normal heart sounds. No murmur. No friction rub. No gallop. Pulmonary:      Effort: Pulmonary effort is normal.      Breath sounds: Wheezing (end-expiratory, BL lower lung fields) present. Comments: Speaking in full sentences  Chest:      Chest wall: No tenderness. Abdominal:      General: Abdomen is flat. Bowel sounds are normal.      Palpations: Abdomen is soft. Tenderness: There is no abdominal tenderness. Hernia: No hernia is present. Musculoskeletal:         General: No deformity. Right lower leg: No edema. Left lower leg: No edema. Comments: Mild erythema and TTP of L thenar eminence, otherwise no TTP or obvious traumatic injury   Skin:     General: Skin is warm and dry. Capillary Refill: Capillary refill takes less than 2 seconds. Neurological:      General: No focal deficit present. Mental Status: She is alert and oriented to person, place, and time.    Psychiatric:         Mood and Affect: Mood normal.         Behavior: Behavior normal.          Diagnostic Study Results     Labs -     Recent Results (from the past 12 hour(s))   EKG, 12 LEAD, INITIAL    Collection Time: 01/04/21  3:32 PM   Result Value Ref Range    Ventricular Rate 113 BPM    Atrial Rate 113 BPM    P-R Interval 158 ms    QRS Duration 84 ms    Q-T Interval 340 ms    QTC Calculation (Bezet) 466 ms    Calculated P Axis 55 degrees    Calculated R Axis 21 degrees    Calculated T Axis 47 degrees    Diagnosis       Sinus tachycardia  When compared with ECG of 28-APR-2020 02:07,  premature supraventricular complexes are no longer present  Confirmed by Giovanni Mohan, P.VWarren ([id number]) on 1/4/2021 17:88:31 PM     METABOLIC PANEL, COMPREHENSIVE    Collection Time: 01/04/21  4:55 PM   Result Value Ref Range    Sodium 136 136 - 145 mmol/L    Potassium 3.6 3.5 - 5.1 mmol/L    Chloride 104 97 - 108 mmol/L    CO2 27 21 - 32 mmol/L    Anion gap 5 5 - 15 mmol/L    Glucose 248 (H) 65 - 100 mg/dL    BUN 20 6 - 20 MG/DL    Creatinine 0.98 0.55 - 1.02 MG/DL    BUN/Creatinine ratio 20 12 - 20      GFR est AA >60 >60 ml/min/1.73m2    GFR est non-AA 55 (L) >60 ml/min/1.73m2    Calcium 8.8 8.5 - 10.1 MG/DL    Bilirubin, total 0.7 0.2 - 1.0 MG/DL    ALT (SGPT) 27 12 - 78 U/L    AST (SGOT) 28 15 - 37 U/L    Alk. phosphatase 83 45 - 117 U/L    Protein, total 7.2 6.4 - 8.2 g/dL    Albumin 3.5 3.5 - 5.0 g/dL    Globulin 3.7 2.0 - 4.0 g/dL    A-G Ratio 0.9 (L) 1.1 - 2.2     TROPONIN I    Collection Time: 01/04/21  4:55 PM   Result Value Ref Range    Troponin-I, Qt. 0.87 (H) <0.05 ng/mL   CBC WITH AUTOMATED DIFF    Collection Time: 01/04/21  4:55 PM   Result Value Ref Range    WBC 9.1 3.6 - 11.0 K/uL    RBC 3.94 3.80 - 5.20 M/uL    HGB 11.9 11.5 - 16.0 g/dL    HCT 36.5 35.0 - 47.0 %    MCV 92.6 80.0 - 99.0 FL    MCH 30.2 26.0 - 34.0 PG    MCHC 32.6 30.0 - 36.5 g/dL    RDW 12.3 11.5 - 14.5 %    PLATELET 083 545 - 183 K/uL    MPV 10.8 8.9 - 12.9 FL    NRBC 0.0 0  WBC    ABSOLUTE NRBC 0.00 0.00 - 0.01 K/uL    NEUTROPHILS 77 (H) 32 - 75 %    LYMPHOCYTES 15 12 - 49 %    MONOCYTES 6 5 - 13 %    EOSINOPHILS 1 0 - 7 %    BASOPHILS 0 0 - 1 %    IMMATURE GRANULOCYTES 1 (H) 0.0 - 0.5 %    ABS. NEUTROPHILS 7.1 1.8 - 8.0 K/UL    ABS. LYMPHOCYTES 1.4 0.8 - 3.5 K/UL    ABS. MONOCYTES 0.5 0.0 - 1.0 K/UL    ABS. EOSINOPHILS 0.1 0.0 - 0.4 K/UL    ABS. BASOPHILS 0.0 0.0 - 0.1 K/UL    ABS. IMM.  GRANS. 0.1 (H) 0.00 - 0.04 K/UL    DF AUTOMATED     NT-PRO BNP    Collection Time: 01/04/21  4:55 PM   Result Value Ref Range    NT pro- (H) <125 PG/ML   PROTHROMBIN TIME + INR    Collection Time: 01/04/21  6:23 PM   Result Value Ref Range    INR 1.0 0.9 - 1.1      Prothrombin time 10.7 9.0 - 11.1 sec   PTT    Collection Time: 01/04/21  6:23 PM   Result Value Ref Range    aPTT <20.0 (L) 22.1 - 31.0 sec    aPTT, therapeutic range     58.0 - 77.0 SECS   SAMPLES BEING HELD    Collection Time: 01/04/21  6:23 PM   Result Value Ref Range    SAMPLES BEING HELD BL     COMMENT        Add-on orders for these samples will be processed based on acceptable specimen integrity and analyte stability, which may vary by analyte. EKG, 12 LEAD, SUBSEQUENT    Collection Time: 01/04/21  6:34 PM   Result Value Ref Range    Ventricular Rate 123 BPM    Atrial Rate 123 BPM    P-R Interval 104 ms    QRS Duration 84 ms    Q-T Interval 416 ms    QTC Calculation (Bezet) 595 ms    Calculated P Axis 30 degrees    Calculated R Axis 29 degrees    Calculated T Axis 67 degrees    Diagnosis       Sinus tachycardia with short NH  Nonspecific T wave abnormality  When compared with ECG of 04-JAN-2021 15:32,  No significant change was found  Confirmed by Davis Branch, P.VWarren ([id number]) on 1/4/2021 10:48:13 PM     SARS-COV-2    Collection Time: 01/04/21  6:37 PM   Result Value Ref Range    Specimen source Nasopharyngeal      Specimen source Nasopharyngeal      COVID-19 rapid test Not detected NOTD      Specimen type NP Swab      Health status Symptomatic Testing     TROPONIN I    Collection Time: 01/04/21  7:00 PM   Result Value Ref Range    Troponin-I, Qt. 1.59 (H) <0.05 ng/mL   GLUCOSE, POC    Collection Time: 01/04/21  8:58 PM   Result Value Ref Range    Glucose (POC) 315 (H) 65 - 100 mg/dL    Performed by Michael Mckee RN        Radiologic Studies -   XR WRIST RT AP/LAT/OBL MIN 3V   Final Result   IMPRESSION: No acute abnormality. XR CHEST PORT   Final Result   IMPRESSION: No acute cardiopulmonary disease. CT Results  (Last 48 hours)    None        CXR Results  (Last 48 hours)               01/04/21 1607  XR CHEST PORT Final result    Impression:  IMPRESSION: No acute cardiopulmonary disease.            Narrative: INDICATION: Shortness of breath, ground-level fall. Portable AP upright view of the chest.       Direct comparison made to prior chest x-ray dated April 27, 2020. Cardiomediastinal silhouette is stable. Lungs are clear bilaterally. Pleural   spaces are normal. Osseous structures are intact. Medical Decision Making   I am the first provider for this patient. I reviewed the vital signs, available nursing notes, past medical history, past surgical history, family history and social history. Vital Signs-Reviewed the patient's vital signs. Patient Vitals for the past 12 hrs:   Temp Pulse Resp BP SpO2   01/04/21 2314     95 %   01/04/21 2018 98.2 °F (36.8 °C) (!) 109 18 133/69 97 %   01/04/21 1811  (!) 125 22 (!) 145/79 96 %   01/04/21 1705  (!) 108 18 122/63 96 %   01/04/21 1613     95 %   01/04/21 1600  (!) 114 20 120/74 94 %   01/04/21 1522 97.8 °F (36.6 °C) (!) 122 24 128/73 96 %       ECG Interpretation: EKG obtained at 1532, sinus tachycardia 113 bpm, normal axis, no interval changes, no ST segment changes, nonischemic appearing EKG    Records Reviewed: Nursing Notes, Old Medical Records, Previous electrocardiograms, Previous Radiology Studies and Previous Laboratory Studies     I reviewed our electronic medical record system for any past medical records that were available that may contribute to the patient's current condition, the nursing notes and vital signs from today's visit. Issac Sullivan MD     Provider Notes (Medical Decision Making):    Deb Duatre is a 77 y.o. female w/PMHx of asthma, COPD not on home O2, DMII, HTN, CAD, HLD, MDD, who presents for shortness of breath and wheezing over the last several weeks with acute worsening today after GLF. VS with O2 95% on RA, tachypneic to low 20s, mild sinus tachycardia, afebrile. Physical exam with mild end-expiratory wheezing, tachypnea, TTP over L thenar eminence.  DDx includes asthma exacerbation, COPD exacerbation, ACS, L wrist contusion or fracture. EKG non-ischemic. Will provide duonebs, prednisone, and obtain CXR, L wrist XR, and labs. Will continue to monitor and reassess symptoms. ED Course and Progress Notes:   Initial assessment performed. The patients presenting problems have been discussed, and they are in agreement with the care plan formulated and outlined with them. I have encouraged them to ask questions as they arise throughout their visit. ED Course as of Jan 04 2325   Page Casarez Jan 04, 2021   1627 Xrays unremarkable. Pt reports significant improvement to symptoms but still has some slight SOB. On exam, she has very slight end expiratory wheeze in R middle lung field. Will provide 3rd duoneb (received 1st in field) and reassess. Will obtain basic labs and provide IVF bolus as pt remains tachycardic and has hx of CAD. [CD]   1808 Trop elevated to 0.87. Pt now endorsing 3/10 chest tightness that started after arrival. Ambulated to bathroom with worsened dyspnea. Will repeat EKG, provide aspirin, and consult cardiology. [CD]      ED Course User Index  [CD] Rita La MD         Diagnosis     Clinical Impression:   1. NSTEMI (non-ST elevated myocardial infarction) ([facility] [address]    2. Asthma with acute exacerbation, unspecified asthma severity, unspecified whether persistent    3.  Fall (on)(from) sidewalk curb, initial encounter        Disposition:  Medicine w/tele        Resident Signature: Bartolome Flowers MD Emergency Medicine PGY 2

## 2021-01-04 NOTE — Clinical Note
Sheath #1: Sheath: inserted. Sheath inserted/placed in the right brachialcephalic  vein.  4/5f Slender sheath

## 2021-01-04 NOTE — ED NOTES
Pt now with a dry, hacking cough. Pt states this is normal for her at night. ER provider informed, order received.

## 2021-01-05 ENCOUNTER — APPOINTMENT (OUTPATIENT)
Dept: NON INVASIVE DIAGNOSTICS | Age: 76
DRG: 281 | End: 2021-01-05
Attending: INTERNAL MEDICINE
Payer: MEDICARE

## 2021-01-05 ENCOUNTER — APPOINTMENT (OUTPATIENT)
Dept: NON INVASIVE DIAGNOSTICS | Age: 76
DRG: 281 | End: 2021-01-05
Attending: NURSE PRACTITIONER
Payer: MEDICARE

## 2021-01-05 PROBLEM — Z98.890 S/P CARDIAC CATH: Status: ACTIVE | Noted: 2021-01-05

## 2021-01-05 PROBLEM — I51.81 TAKOTSUBO CARDIOMYOPATHY: Status: ACTIVE | Noted: 2021-01-05

## 2021-01-05 LAB
APPEARANCE UR: ABNORMAL
APTT PPP: 38 SEC (ref 22.1–31)
APTT PPP: 79.4 SEC (ref 22.1–31)
BACTERIA URNS QL MICRO: ABNORMAL /HPF
BILIRUB UR QL: NEGATIVE
CHOLEST SERPL-MCNC: 204 MG/DL
COLOR UR: ABNORMAL
EPITH CASTS URNS QL MICRO: ABNORMAL /LPF
EST. AVERAGE GLUCOSE BLD GHB EST-MCNC: 183 MG/DL
GLUCOSE BLD STRIP.AUTO-MCNC: 188 MG/DL (ref 65–100)
GLUCOSE BLD STRIP.AUTO-MCNC: 278 MG/DL (ref 65–100)
GLUCOSE BLD STRIP.AUTO-MCNC: 342 MG/DL (ref 65–100)
GLUCOSE BLD STRIP.AUTO-MCNC: 359 MG/DL (ref 65–100)
GLUCOSE BLD STRIP.AUTO-MCNC: 448 MG/DL (ref 65–100)
GLUCOSE BLD STRIP.AUTO-MCNC: 485 MG/DL (ref 65–100)
GLUCOSE UR STRIP.AUTO-MCNC: >1000 MG/DL
HBA1C MFR BLD: 8 % (ref 4–5.6)
HDLC SERPL-MCNC: 83 MG/DL
HDLC SERPL: 2.5 {RATIO} (ref 0–5)
HGB UR QL STRIP: ABNORMAL
HYALINE CASTS URNS QL MICRO: ABNORMAL /LPF (ref 0–5)
KETONES UR QL STRIP.AUTO: NEGATIVE MG/DL
LDLC SERPL CALC-MCNC: 112.2 MG/DL (ref 0–100)
LEUKOCYTE ESTERASE UR QL STRIP.AUTO: ABNORMAL
LIPID PROFILE,FLP: ABNORMAL
NITRITE UR QL STRIP.AUTO: POSITIVE
PH UR STRIP: 6 [PH] (ref 5–8)
PROT UR STRIP-MCNC: NEGATIVE MG/DL
RBC #/AREA URNS HPF: ABNORMAL /HPF (ref 0–5)
SERVICE CMNT-IMP: ABNORMAL
SP GR UR REFRACTOMETRY: >1.03 (ref 1–1.03)
THERAPEUTIC RANGE,PTTT: ABNORMAL SECS (ref 58–77)
THERAPEUTIC RANGE,PTTT: ABNORMAL SECS (ref 58–77)
TRIGL SERPL-MCNC: 44 MG/DL (ref ?–150)
TROPONIN I SERPL-MCNC: 2.41 NG/ML
UA: UC IF INDICATED,UAUC: ABNORMAL
UROBILINOGEN UR QL STRIP.AUTO: 0.2 EU/DL (ref 0.2–1)
VLDLC SERPL CALC-MCNC: 8.8 MG/DL
WBC URNS QL MICRO: ABNORMAL /HPF (ref 0–4)

## 2021-01-05 PROCEDURE — C1769 GUIDE WIRE: HCPCS | Performed by: INTERNAL MEDICINE

## 2021-01-05 PROCEDURE — 74011250636 HC RX REV CODE- 250/636: Performed by: STUDENT IN AN ORGANIZED HEALTH CARE EDUCATION/TRAINING PROGRAM

## 2021-01-05 PROCEDURE — 82962 GLUCOSE BLOOD TEST: CPT

## 2021-01-05 PROCEDURE — 77030004549 HC CATH ANGI DX PRF MRTM -A: Performed by: INTERNAL MEDICINE

## 2021-01-05 PROCEDURE — 74011250637 HC RX REV CODE- 250/637: Performed by: INTERNAL MEDICINE

## 2021-01-05 PROCEDURE — 83036 HEMOGLOBIN GLYCOSYLATED A1C: CPT

## 2021-01-05 PROCEDURE — 74011250636 HC RX REV CODE- 250/636: Performed by: NURSE PRACTITIONER

## 2021-01-05 PROCEDURE — 94640 AIRWAY INHALATION TREATMENT: CPT

## 2021-01-05 PROCEDURE — 74011250637 HC RX REV CODE- 250/637: Performed by: STUDENT IN AN ORGANIZED HEALTH CARE EDUCATION/TRAINING PROGRAM

## 2021-01-05 PROCEDURE — C1751 CATH, INF, PER/CENT/MIDLINE: HCPCS | Performed by: INTERNAL MEDICINE

## 2021-01-05 PROCEDURE — C1894 INTRO/SHEATH, NON-LASER: HCPCS | Performed by: INTERNAL MEDICINE

## 2021-01-05 PROCEDURE — 77030019698 HC SYR ANGI MDLON MRTM -A: Performed by: INTERNAL MEDICINE

## 2021-01-05 PROCEDURE — 74011000250 HC RX REV CODE- 250: Performed by: STUDENT IN AN ORGANIZED HEALTH CARE EDUCATION/TRAINING PROGRAM

## 2021-01-05 PROCEDURE — 84484 ASSAY OF TROPONIN QUANT: CPT

## 2021-01-05 PROCEDURE — 74011250636 HC RX REV CODE- 250/636: Performed by: INTERNAL MEDICINE

## 2021-01-05 PROCEDURE — 87086 URINE CULTURE/COLONY COUNT: CPT

## 2021-01-05 PROCEDURE — 99152 MOD SED SAME PHYS/QHP 5/>YRS: CPT | Performed by: INTERNAL MEDICINE

## 2021-01-05 PROCEDURE — 77030015766: Performed by: INTERNAL MEDICINE

## 2021-01-05 PROCEDURE — 74011000636 HC RX REV CODE- 636: Performed by: INTERNAL MEDICINE

## 2021-01-05 PROCEDURE — 77030010221 HC SPLNT WR POS TELE -B: Performed by: INTERNAL MEDICINE

## 2021-01-05 PROCEDURE — 99223 1ST HOSP IP/OBS HIGH 75: CPT | Performed by: INTERNAL MEDICINE

## 2021-01-05 PROCEDURE — 93460 R&L HRT ART/VENTRICLE ANGIO: CPT | Performed by: INTERNAL MEDICINE

## 2021-01-05 PROCEDURE — C8929 TTE W OR WO FOL WCON,DOPPLER: HCPCS

## 2021-01-05 PROCEDURE — 36415 COLL VENOUS BLD VENIPUNCTURE: CPT

## 2021-01-05 PROCEDURE — 80061 LIPID PANEL: CPT

## 2021-01-05 PROCEDURE — 85730 THROMBOPLASTIN TIME PARTIAL: CPT

## 2021-01-05 PROCEDURE — 77030008543 HC TBNG MON PRSS MRTM -A: Performed by: INTERNAL MEDICINE

## 2021-01-05 PROCEDURE — 77030019569 HC BND COMPR RAD TERU -B: Performed by: INTERNAL MEDICINE

## 2021-01-05 PROCEDURE — 93306 TTE W/DOPPLER COMPLETE: CPT | Performed by: INTERNAL MEDICINE

## 2021-01-05 PROCEDURE — 77030028837 HC SYR ANGI PWR INJ COEU -A: Performed by: INTERNAL MEDICINE

## 2021-01-05 PROCEDURE — 4A023N8 MEASUREMENT OF CARDIAC SAMPLING AND PRESSURE, BILATERAL, PERCUTANEOUS APPROACH: ICD-10-PCS | Performed by: INTERNAL MEDICINE

## 2021-01-05 PROCEDURE — 74011250637 HC RX REV CODE- 250/637: Performed by: NURSE PRACTITIONER

## 2021-01-05 PROCEDURE — 74011000250 HC RX REV CODE- 250: Performed by: INTERNAL MEDICINE

## 2021-01-05 PROCEDURE — 81001 URINALYSIS AUTO W/SCOPE: CPT

## 2021-01-05 PROCEDURE — 65660000000 HC RM CCU STEPDOWN

## 2021-01-05 PROCEDURE — B2111ZZ FLUOROSCOPY OF MULTIPLE CORONARY ARTERIES USING LOW OSMOLAR CONTRAST: ICD-10-PCS | Performed by: INTERNAL MEDICINE

## 2021-01-05 PROCEDURE — 87077 CULTURE AEROBIC IDENTIFY: CPT

## 2021-01-05 PROCEDURE — 74011636637 HC RX REV CODE- 636/637: Performed by: STUDENT IN AN ORGANIZED HEALTH CARE EDUCATION/TRAINING PROGRAM

## 2021-01-05 PROCEDURE — 74011636637 HC RX REV CODE- 636/637: Performed by: NURSE PRACTITIONER

## 2021-01-05 PROCEDURE — 87186 SC STD MICRODIL/AGAR DIL: CPT

## 2021-01-05 PROCEDURE — 99153 MOD SED SAME PHYS/QHP EA: CPT | Performed by: INTERNAL MEDICINE

## 2021-01-05 RX ORDER — VERAPAMIL HYDROCHLORIDE 2.5 MG/ML
INJECTION, SOLUTION INTRAVENOUS AS NEEDED
Status: DISCONTINUED | OUTPATIENT
Start: 2021-01-05 | End: 2021-01-05 | Stop reason: HOSPADM

## 2021-01-05 RX ORDER — INSULIN LISPRO 100 [IU]/ML
13 INJECTION, SOLUTION INTRAVENOUS; SUBCUTANEOUS ONCE
Status: COMPLETED | OUTPATIENT
Start: 2021-01-05 | End: 2021-01-05

## 2021-01-05 RX ORDER — MIDAZOLAM HYDROCHLORIDE 1 MG/ML
INJECTION, SOLUTION INTRAMUSCULAR; INTRAVENOUS AS NEEDED
Status: DISCONTINUED | OUTPATIENT
Start: 2021-01-05 | End: 2021-01-05 | Stop reason: HOSPADM

## 2021-01-05 RX ORDER — HEPARIN SODIUM 1000 [USP'U]/ML
INJECTION, SOLUTION INTRAVENOUS; SUBCUTANEOUS AS NEEDED
Status: DISCONTINUED | OUTPATIENT
Start: 2021-01-05 | End: 2021-01-05 | Stop reason: HOSPADM

## 2021-01-05 RX ORDER — SODIUM CHLORIDE 9 MG/ML
50 INJECTION, SOLUTION INTRAVENOUS CONTINUOUS
Status: DISCONTINUED | OUTPATIENT
Start: 2021-01-05 | End: 2021-01-07 | Stop reason: HOSPADM

## 2021-01-05 RX ORDER — HEPARIN SODIUM 200 [USP'U]/100ML
INJECTION, SOLUTION INTRAVENOUS
Status: COMPLETED | OUTPATIENT
Start: 2021-01-05 | End: 2021-01-05

## 2021-01-05 RX ORDER — LIDOCAINE HYDROCHLORIDE 10 MG/ML
INJECTION, SOLUTION EPIDURAL; INFILTRATION; INTRACAUDAL; PERINEURAL AS NEEDED
Status: DISCONTINUED | OUTPATIENT
Start: 2021-01-05 | End: 2021-01-05 | Stop reason: HOSPADM

## 2021-01-05 RX ORDER — CARVEDILOL 6.25 MG/1
6.25 TABLET ORAL 2 TIMES DAILY WITH MEALS
Status: DISCONTINUED | OUTPATIENT
Start: 2021-01-05 | End: 2021-01-07 | Stop reason: HOSPADM

## 2021-01-05 RX ORDER — LISINOPRIL 5 MG/1
2.5 TABLET ORAL DAILY
Status: DISCONTINUED | OUTPATIENT
Start: 2021-01-06 | End: 2021-01-06

## 2021-01-05 RX ORDER — ALPRAZOLAM 0.5 MG/1
0.5 TABLET ORAL ONCE
Status: COMPLETED | OUTPATIENT
Start: 2021-01-06 | End: 2021-01-05

## 2021-01-05 RX ORDER — FENTANYL CITRATE 50 UG/ML
INJECTION, SOLUTION INTRAMUSCULAR; INTRAVENOUS AS NEEDED
Status: DISCONTINUED | OUTPATIENT
Start: 2021-01-05 | End: 2021-01-05 | Stop reason: HOSPADM

## 2021-01-05 RX ADMIN — METHYLPREDNISOLONE SODIUM SUCCINATE 40 MG: 125 INJECTION, POWDER, FOR SOLUTION INTRAMUSCULAR; INTRAVENOUS at 13:28

## 2021-01-05 RX ADMIN — HEPARIN SODIUM 4000 UNITS: 5000 INJECTION INTRAVENOUS; SUBCUTANEOUS at 03:51

## 2021-01-05 RX ADMIN — FAMOTIDINE 20 MG: 20 TABLET, FILM COATED ORAL at 11:52

## 2021-01-05 RX ADMIN — PERFLUTREN 2 ML: 6.52 INJECTION, SUSPENSION INTRAVENOUS at 10:04

## 2021-01-05 RX ADMIN — FAMOTIDINE 20 MG: 20 TABLET, FILM COATED ORAL at 18:36

## 2021-01-05 RX ADMIN — CYANOCOBALAMIN TAB 500 MCG 1000 MCG: 500 TAB at 11:52

## 2021-01-05 RX ADMIN — INSULIN LISPRO 13 UNITS: 100 INJECTION, SOLUTION INTRAVENOUS; SUBCUTANEOUS at 20:43

## 2021-01-05 RX ADMIN — INSULIN LISPRO 7 UNITS: 100 INJECTION, SOLUTION INTRAVENOUS; SUBCUTANEOUS at 08:46

## 2021-01-05 RX ADMIN — ATORVASTATIN CALCIUM 20 MG: 20 TABLET, FILM COATED ORAL at 11:52

## 2021-01-05 RX ADMIN — ALBUTEROL SULFATE 1 PUFF: 90 AEROSOL, METERED RESPIRATORY (INHALATION) at 08:44

## 2021-01-05 RX ADMIN — BUDESONIDE 500 MCG: 0.5 INHALANT RESPIRATORY (INHALATION) at 22:05

## 2021-01-05 RX ADMIN — ALBUTEROL SULFATE 2 PUFF: 90 AEROSOL, METERED RESPIRATORY (INHALATION) at 18:30

## 2021-01-05 RX ADMIN — ALBUTEROL SULFATE 2 PUFF: 90 AEROSOL, METERED RESPIRATORY (INHALATION) at 02:27

## 2021-01-05 RX ADMIN — INSULIN LISPRO 5 UNITS: 100 INJECTION, SOLUTION INTRAVENOUS; SUBCUTANEOUS at 11:59

## 2021-01-05 RX ADMIN — CARVEDILOL 6.25 MG: 6.25 TABLET, FILM COATED ORAL at 18:36

## 2021-01-05 RX ADMIN — ALPRAZOLAM 0.5 MG: 0.5 TABLET ORAL at 23:46

## 2021-01-05 RX ADMIN — METHYLPREDNISOLONE SODIUM SUCCINATE 40 MG: 125 INJECTION, POWDER, FOR SOLUTION INTRAMUSCULAR; INTRAVENOUS at 07:58

## 2021-01-05 RX ADMIN — SERTRALINE 150 MG: 50 TABLET, FILM COATED ORAL at 11:52

## 2021-01-05 RX ADMIN — METHYLPREDNISOLONE SODIUM SUCCINATE 40 MG: 125 INJECTION, POWDER, FOR SOLUTION INTRAMUSCULAR; INTRAVENOUS at 23:30

## 2021-01-05 RX ADMIN — ALBUTEROL SULFATE 2 PUFF: 90 AEROSOL, METERED RESPIRATORY (INHALATION) at 22:02

## 2021-01-05 RX ADMIN — SODIUM CHLORIDE 50 ML/HR: 9 INJECTION, SOLUTION INTRAVENOUS at 13:29

## 2021-01-05 RX ADMIN — METOPROLOL TARTRATE 25 MG: 25 TABLET, FILM COATED ORAL at 11:52

## 2021-01-05 RX ADMIN — BUPROPION HYDROCHLORIDE 300 MG: 150 TABLET, EXTENDED RELEASE ORAL at 11:52

## 2021-01-05 NOTE — H&P
Hospitalist Admission Note    NAME: Enzo Marin   :  1945   MRN:  [id number]     Date/Time:  2021 7:21 PM    Patient PCP: Ondina Ayala MD  ______________________________________________________________________  Given the patient's current clinical presentation, I have a high level of concern for decompensation if discharged from the emergency department. Complex decision making was performed, which includes reviewing the patient's available past medical records, laboratory results, and x-ray films. My assessment of this patient's clinical condition and my plan of care is as follows. Assessment / Plan:    Chest pain secondary to NSTEMI  -Trop 0.87, another troponin pending. -EKG shows tachycardia with nonspecific T wave abnormality. -CXR - No acute cardiopulmonary disease.  -Cardiology on board.  -Currently on heparin drip.  -Echo pending.  -Catheterization tomorrow. Shortness of breath secondary to asthma COPD exacerbation  -Covid PCR pending.  -Portable every 4 hours, Pulmicort twice daily  -Solu-Medrol 40 every 8 IV. -Started on azithromycin.  -Currently on room air. Improved after breathing treatments.  -Patient home medication is Clementeen Holly is Dr. Elena Carlson    Sinus tachycardia  -Secondary to NSTEMI with COPD exacerbation.  -Currently on telemetry.  -Echo pending. Diabetes type 2 with hyperglycemia  -HbA1c pending, on sliding scale insulin.  -Blood glucose 248 on admission. Hypertension  Hyperlipidemia  Anxiety disorder  GERD  Depression  -Continue home medications, held lisinopril because patient has a long history of cough and this could be a side effect of lisinopril. Patient should be discharged on some other blood pressure medication.  -Lipid panel pending.     Code Status: Full code  Surrogate Decision Maker:     DVT Prophylaxis: Currently on Heparin drip  GI Prophylaxis: not indicated    Baseline: Ambulatory at home Subjective:   CHIEF COMPLAINT: Pressure on the chest, shortness of breath    HISTORY OF PRESENT ILLNESS:     Rula Silva is a 77 y.o.  female who presents with shortness of breath and pressure in the chest that started today after lunch. Patient past medical history of hypertension, asthma, COPD, anxiety disorder, GERD, diabetes type 2, hyperlipidemia. Patient states that today after lunch while she was shopping she started having shortness of breath and sudden tightness in the chest to the point that she fell down, 2 people help the patient to get back and she came to the ER by ambulance. Patient states that the chest tightness is 6/10 in severity located all over the chest, still present, associated with shortness of breath with no nausea and vomiting. Patient denies any diaphoresis, no fever and chills, no history of sick contacts, no dizziness. Patient has longstanding history of dry cough which sometimes get better with syrup. Patient states that she had a similar episode around 10 years ago for which she Cardy cardiac catheterization and it was normal according to the patient. Patient is a non-smoker, sees Dr. Rai Meraz for COPD. We were asked to admit for work up and evaluation of the above problems.      Past Medical History:   Diagnosis Date    Asthma     [facility]ervical [facility] ([facility] 1981    COPD     Depression with anxiety     Diabetes ([facility]     currently on no meds    Other and unspecified hyperlipidemia     Type II or unspecified type diabetes mellitus without mention of complication, uncontrolled     A1c 9.2 8/2012;  used to see Dr. John Camacho essential hypertension         Past Surgical History:   Procedure Laterality Date    CARDIAC CATHETERIZATION  2/21/2013         HX APPENDECTOMY      HX CERVICAL FUSION      HX AURY AND BSO      for cervical cancer       Social History     Tobacco Use    Smoking status: Never Smoker    Smokeless tobacco: Never Used   [address] [place] [address] Tobacco comment: exposed to second hand smoking in past   Substance Use Topics    Alcohol use: No     Comment: rarely a mixed drink        Family History   Problem Relation Age of Onset    Heart Disease Neg Hx     Stroke Neg Hx      Allergies   Allergen Reactions    Metformin Nausea and Vomiting        Prior to Admission medications    Medication Sig Start Date End Date Taking? Authorizing Provider   famotidine (Pepcid) 20 mg tablet Take 1 Tab by mouth two (2) times a day. 4/28/20   Kia Chambers MD   lisinopril (PRINIVIL, ZESTRIL) 10 mg tablet Take 5 mg by mouth daily. OtherMendoza MD   buPROPion XL (WELLBUTRIN XL) 300 mg XL tablet Take 300 mg by mouth every morning. OtherMendoza MD   glimepiride (AMARYL) 2 mg tablet Take 2 mg by mouth every morning. OtherMendoza MD   atorvastatin (LIPITOR) 20 mg tablet Take 20 mg by mouth daily. OtherMendoza MD   aspirin delayed-release 81 mg tablet Take 81 mg by mouth daily. Provider, Historical   pioglitazone (ACTOS) 15 mg tablet Take 15 mg by mouth daily. Provider, Historical   dextromethorphan-guaiFENesin (ROBITUSSIN-DM)  mg/5 mL syrup Take 10 mL by mouth every four (4) hours as needed for Cough. Provider, Historical   cholecalciferol (VITAMIN D3) 1,000 unit tablet Take 1,000 Units by mouth daily. Provider, Historical   cyanocobalamin 1,000 mcg tablet Take 1,000 mcg by mouth daily. Provider, Historical   albuterol-ipratropium (DUONEB) 2.5 mg-0.5 mg/3 ml nebulizer solution 3 mL by Nebulization route every four (4) hours as needed for Wheezing. 2/25/13   Eleazar Hawkins MD   ALPRAZolam Unice Nigh) 0.5 mg tablet Take 0.5 mg by mouth two (2) times daily as needed. Provider, Historical   albuterol (PROVENTIL, VENTOLIN) 90 mcg/actuation inhaler Take 1-2 Puffs by inhalation every four (4) hours as needed for Wheezing. 2/18/13   Aurelia Snyder MD   sertraline (ZOLOFT) 100 mg tablet Take 150 mg by mouth daily.     Provider, Historical REVIEW OF SYSTEMS:     I am not able to complete the review of systems because: The patient is intubated and sedated    The patient has altered mental status due to his acute medical problems    The patient has baseline aphasia from prior stroke(s)    The patient has baseline dementia and is not reliable historian    The patient is in acute medical distress and unable to provide information           Total of 12 systems reviewed as follows:       POSITIVE= underlined text  Negative = text not underlined  General:  fever, chills, sweats, generalized weakness, weight loss/gain,      loss of appetite   Eyes:    blurred vision, eye pain, loss of vision, double vision  ENT:    rhinorrhea, pharyngitis   Respiratory:   cough, sputum production, SOB, CURTIS, wheezing, pleuritic pain   Cardiology:   chest pain, palpitations, orthopnea, PND, edema, syncope   Gastrointestinal:  abdominal pain , N/V, diarrhea, dysphagia, constipation, bleeding   Genitourinary:  frequency, urgency, dysuria, hematuria, incontinence   Muskuloskeletal :  arthralgia, myalgia, back pain  Hematology:  easy bruising, nose or gum bleeding, lymphadenopathy   Dermatological: rash, ulceration, pruritis, color change / jaundice  Endocrine:   hot flashes or polydipsia   Neurological:  headache, dizziness, confusion, focal weakness, paresthesia,     Speech difficulties, memory loss, gait difficulty  Psychological: Feelings of anxiety, depression, agitation    Objective:   VITALS:    Visit Vitals  BP (!) 145/79 (BP Patient Position: At rest)   Pulse (!) 125   Temp 97.8 °F (36.6 °C)   Resp 22   Ht 5' 4\" (1.626 m)   Wt 81.2 kg (179 lb)   SpO2 96%   BMI 30.73 kg/m²       PHYSICAL EXAM:    General:    Alert, cooperative, no distress, appears stated age.      HEENT: Atraumatic, anicteric sclerae, pink conjunctivae     No oral ulcers, mucosa moist, throat clear, dentition fair  Neck:  Supple, symmetrical,  thyroid: non tender  Lungs:   Clear to auscultation bilaterally. No Wheezing or Rhonchi. No rales. Chest wall:  No tenderness  No Accessory muscle use. Heart:   Regular  rhythm,  No  murmur   No edema  Abdomen:   Soft, non-tender. Not distended. Bowel sounds normal  Extremities: No cyanosis. No clubbing,      Skin turgor normal, Capillary refill normal, Radial dial pulse 2+  Skin:     Not pale. Not Jaundiced  No rashes   Psych:  Good insight. Not depressed. Not anxious or agitated. Neurologic: EOMs intact. No facial asymmetry. No aphasia or slurred speech. Symmetrical strength, Sensation grossly intact. Alert and oriented X 4.     _______________________________________________________________________  Care Plan discussed with:    Comments   Patient x    Family  x    RN x    Care Manager                    Consultant:      _______________________________________________________________________  Expected  Disposition:   Home with Family x   HH/PT/OT/RN    SNF/LTC    GIANFRANCO    ________________________________________________________________________  TOTAL TIME:  64 Minutes    Critical Care Provided     Minutes non procedure based      Comments     Reviewed previous records   >50% of visit spent in counseling and coordination of care  Discussion with patient and/or family and questions answered       ________________________________________________________________________  Signed: Wellington Dang MD    Procedures: see electronic medical records for all procedures/Xrays and details which were not copied into this note but were reviewed prior to creation of Plan.     LAB DATA REVIEWED:    Recent Results (from the past 24 hour(s))   EKG, 12 LEAD, INITIAL    Collection Time: 01/04/21  3:32 PM   Result Value Ref Range    Ventricular Rate 113 BPM    Atrial Rate 113 BPM    P-R Interval 158 ms    QRS Duration 84 ms    Q-T Interval 340 ms    QTC Calculation (Bezet) 466 ms    Calculated P Axis 55 degrees    Calculated R Axis 21 degrees    Calculated T Axis 47 degrees Diagnosis       Sinus tachycardia  When compared with ECG of 28-APR-2020 02:07,  premature supraventricular complexes are no longer present     METABOLIC PANEL, COMPREHENSIVE    Collection Time: 01/04/21  4:55 PM   Result Value Ref Range    Sodium 136 136 - 145 mmol/L    Potassium 3.6 3.5 - 5.1 mmol/L    Chloride 104 97 - 108 mmol/L    CO2 27 21 - 32 mmol/L    Anion gap 5 5 - 15 mmol/L    Glucose 248 (H) 65 - 100 mg/dL    BUN 20 6 - 20 MG/DL    Creatinine 0.98 0.55 - 1.02 MG/DL    BUN/Creatinine ratio 20 12 - 20      GFR est AA >60 >60 ml/min/1.73m2    GFR est non-AA 55 (L) >60 ml/min/1.73m2    Calcium 8.8 8.5 - 10.1 MG/DL    Bilirubin, total 0.7 0.2 - 1.0 MG/DL    ALT (SGPT) 27 12 - 78 U/L    AST (SGOT) 28 15 - 37 U/L    Alk. phosphatase 83 45 - 117 U/L    Protein, total 7.2 6.4 - 8.2 g/dL    Albumin 3.5 3.5 - 5.0 g/dL    Globulin 3.7 2.0 - 4.0 g/dL    A-G Ratio 0.9 (L) 1.1 - 2.2     TROPONIN I    Collection Time: 01/04/21  4:55 PM   Result Value Ref Range    Troponin-I, Qt. 0.87 (H) <0.05 ng/mL   CBC WITH AUTOMATED DIFF    Collection Time: 01/04/21  4:55 PM   Result Value Ref Range    WBC 9.1 3.6 - 11.0 K/uL    RBC 3.94 3.80 - 5.20 M/uL    HGB 11.9 11.5 - 16.0 g/dL    HCT 36.5 35.0 - 47.0 %    MCV 92.6 80.0 - 99.0 FL    MCH 30.2 26.0 - 34.0 PG    MCHC 32.6 30.0 - 36.5 g/dL    RDW 12.3 11.5 - 14.5 %    PLATELET 283 706 - 877 K/uL    MPV 10.8 8.9 - 12.9 FL    NRBC 0.0 0  WBC    ABSOLUTE NRBC 0.00 0.00 - 0.01 K/uL    NEUTROPHILS 77 (H) 32 - 75 %    LYMPHOCYTES 15 12 - 49 %    MONOCYTES 6 5 - 13 %    EOSINOPHILS 1 0 - 7 %    BASOPHILS 0 0 - 1 %    IMMATURE GRANULOCYTES 1 (H) 0.0 - 0.5 %    ABS. NEUTROPHILS 7.1 1.8 - 8.0 K/UL    ABS. LYMPHOCYTES 1.4 0.8 - 3.5 K/UL    ABS. MONOCYTES 0.5 0.0 - 1.0 K/UL    ABS. EOSINOPHILS 0.1 0.0 - 0.4 K/UL    ABS. BASOPHILS 0.0 0.0 - 0.1 K/UL    ABS. IMM.  GRANS. 0.1 (H) 0.00 - 0.04 K/UL    DF AUTOMATED     NT-PRO BNP    Collection Time: 01/04/21  4:55 PM   Result Value Ref Range NT pro- (H) <125 PG/ML   PROTHROMBIN TIME + INR    Collection Time: 01/04/21  6:23 PM   Result Value Ref Range    INR 1.0 0.9 - 1.1      Prothrombin time 10.7 9.0 - 11.1 sec   PTT    Collection Time: 01/04/21  6:23 PM   Result Value Ref Range    aPTT <20.0 (L) 22.1 - 31.0 sec    aPTT, therapeutic range     58.0 - 77.0 SECS   SAMPLES BEING HELD    Collection Time: 01/04/21  6:23 PM   Result Value Ref Range    SAMPLES BEING HELD BL     COMMENT        Add-on orders for these samples will be processed based on acceptable specimen integrity and analyte stability, which may vary by analyte.    EKG, 12 LEAD, SUBSEQUENT    Collection Time: 01/04/21  6:34 PM   Result Value Ref Range    Ventricular Rate 123 BPM    Atrial Rate 123 BPM    P-R Interval 104 ms    QRS Duration 84 ms    Q-T Interval 416 ms    QTC Calculation (Bezet) 595 ms    Calculated P Axis 30 degrees    Calculated R Axis 29 degrees    Calculated T Axis 67 degrees    Diagnosis       Sinus tachycardia with short MO  Nonspecific T wave abnormality  When compared with ECG of 04-JAN-2021 15:32,  MANUAL COMPARISON REQUIRED, DATA IS UNCONFIRMED     SARS-COV-2    Collection Time: 01/04/21  6:37 PM   Result Value Ref Range    Specimen source Nasopharyngeal      SARS-CoV-2 PENDING     SARS-CoV-2 PENDING     Specimen source Nasopharyngeal      COVID-19 rapid test PENDING     Specimen type NP Swab      Health status PENDING     COVID-19 PENDING

## 2021-01-05 NOTE — ROUTINE PROCESS
TRANSFER - OUT REPORT: 
 
Verbal report given to [facility] RN on Jean Pierre Miles  being transferred to [facility](unit) for routine progression of care Report consisted of patients Situation, Background, Assessment and  
Recommendations(SBAR). Information from the following report(s) SBAR was reviewed with the receiving nurse. Lines:  
Peripheral IV 01/04/21 Left Hand (Active) Site Assessment Clean, dry, & intact 01/04/21 1527 Phlebitis Assessment 0 01/04/21 1527 Infiltration Assessment 0 01/04/21 1527 Dressing Status Clean, dry, & intact 01/04/21 1527 Dressing Type Transparent 01/04/21 1527 Opportunity for questions and clarification was provided.

## 2021-01-05 NOTE — PROGRESS NOTES
Received notification from bedside RN about patient with regards to: 1900 Troponin 1.59. Patient on Heparin drip, no acute symptom at the moment. Cardiology on board with planned catheterization in AM, patient NPO.   VS: /69, , RR 18, O2 sat 97% on RA    Intervention given: re check troponin at 0100

## 2021-01-05 NOTE — CONSULTS
[address]  [phone]        Date of  Admission: 1/4/2021  3:17 PM     Admission type:Emergency    Consult for: NSTEMI (non-ST elevated myocardial infarction) ([facility] [I21.4]  Asthma exacerbation [J45.901]  SIRS (systemic inflammatory response syndrome) ([facility] [R65.10]  Consult by: Dr Julian Fang NP     Subjective:     Katya Salazar is a 77 y.o. female admitted for NSTEMI (non-ST elevated myocardial infarction) ([facility] [I21.4]  Asthma exacerbation [J45.901]  SIRS (systemic inflammatory response syndrome) ([facility] [R65.10]. Patient complains of tightness, \"band like\" tightness under her breast for the last week or so. Yesterday the band felt \"wider\" and tighter. Associated sx of fatigue with minimal exertion (I.e. walking to the bathroom). She has hx of asthma however she is adamant that this is not her asthma. No change in dyspnea. At this time, she is experiencing chest tightness at rest.    On statin, bb, heparin. Previous treatment/evaluation includes echocardiogram and cardiac catheterization . Cardiac risk factors: dyslipidemia, obesity, sedentary life style, post-menopausal, 20% prox lad per [facility] in 2013.     Patient Active Problem List    Diagnosis Date Noted    SIRS (systemic inflammatory response syndrome) ([facility] 01/04/2021    Asthma exacerbation 01/04/2021    Respiratory failure ([facility] 05/10/2018    CAD (coronary artery disease), native coronary artery 02/22/2013    Hyperlipidemia LDL goal < 70 02/22/2013    NSTEMI (non-ST elevated myocardial infarction) ([facility] 02/20/2013    Acute respiratory failure ([facility] 02/19/2013    Unspecified asthma, with exacerbation 02/19/2013    Type II or unspecified type diabetes mellitus without mention of complication, uncontrolled     Other and unspecified hyperlipidemia     Unspecified essential hypertension       Dino Garcia MD  Past Medical History:   Diagnosis Date    Asthma     Cervical cancer ([facility] 1981    COPD     Depression with anxiety     Diabetes ([facility]     currently on no meds    Other and unspecified hyperlipidemia     Type II or unspecified type diabetes mellitus without mention of complication, uncontrolled     A1c 9.2 8/2012;  used to see Dr. Yoseph Morris essential hypertension       Social History     Socioeconomic History    Marital status:      Spouse name: Not on file    Number of children: Not on file    Years of education: Not on file    Highest education level: Not on file   Occupational History    Occupation:      Employer: SELF EMPLOYED   Tobacco Use    Smoking status: Never Smoker    Smokeless tobacco: Never Used    Tobacco comment: exposed to second hand smoking in past   Substance and Sexual Activity    Alcohol use: No     Comment: rarely a mixed drink    Drug use: No   Social History Narrative    Lives in [place] with  of 11 years.  (2nd marriage). Has 2 daughters and 1 son from a previous marriage. Works as a  for [employer]. Likes to volunteer at the [employer].      Allergies   Allergen Reactions    Metformin Nausea and Vomiting      Family History   Problem Relation Age of Onset    Heart Disease Neg Hx     Stroke Neg Hx       Current Facility-Administered Medications   Medication Dose Route Frequency    metoprolol tartrate (LOPRESSOR) tablet 25 mg  25 mg Oral Q12H    heparin 25,000 units in D5W 250 ml infusion  12-25 Units/kg/hr IntraVENous TITRATE    nitroglycerin (NITROSTAT) tablet 0.4 mg  0.4 mg SubLINGual Q5MIN PRN    albuterol (PROVENTIL HFA, VENTOLIN HFA, PROAIR HFA) inhaler 1-2 Puff  1-2 Puff Inhalation Q4H RT    atorvastatin (LIPITOR) tablet 20 mg  20 mg Oral DAILY    buPROPion XL (WELLBUTRIN XL) tablet 300 mg  300 mg Oral DAILY    sertraline (ZOLOFT) tablet 150 mg  150 mg Oral DAILY    cyanocobalamin (VITAMIN B12) tablet 1,000 mcg  1,000 mcg Oral DAILY    famotidine (PEPCID) tablet 20 mg  20 mg Oral BID    methylPREDNISolone (PF) (Solu-MEDROL) injection 40 mg  40 mg IntraVENous Q8H    insulin lispro (HUMALOG) injection   SubCUTAneous AC&HS    glucose chewable tablet 16 g  4 Tab Oral PRN    dextrose (D50W) injection syrg 12.5-25 g  12.5-25 g IntraVENous PRN    glucagon (GLUCAGEN) injection 1 mg  1 mg IntraMUSCular PRN    heparin (porcine) injection 2,000 Units  2,000 Units IntraVENous PRN    Or    heparin (porcine) injection 4,000 Units  4,000 Units IntraVENous PRN    budesonide (PULMICORT) 500 mcg/2 ml nebulizer suspension  500 mcg Nebulization BID RT         Review of Symptoms:  Constitutional: negative  Eyes: negative  Ears, nose, mouth, throat, and face: negative  Respiratory: negative  Cardiovascular: CHEST PAIN  Gastrointestinal: negative  Genitourinary:negative  Musculoskeletal:negative  Neurological: negative  Endocrine: negative     Subjective:      Visit Vitals  /69 (BP 1 Location: Right arm, BP Patient Position: At rest)   Pulse 81   Temp 97.7 °F (36.5 °C)   Resp 16   Ht 5' 4\" (1.626 m)   Wt 179 lb (81.2 kg)   SpO2 96%   BMI 30.73 kg/m²       Physical:    General: WD, WN. Alert, cooperative, no acute distress  Heart: Regular, S1 WNL and S2 WNL.  No S3 or S4, no m/S3/JVD, no carotid bruits   Lungs: scant wheezing  Abdomen: Soft, +BS, NTND   Extremities: LE chiara +DP/PT, no edema   Neurologic: Grossly normal    Data Review:   Recent Labs     01/04/21  1655   WBC 9.1   HGB 11.9   HCT 36.5        Recent Labs     01/04/21  1823 01/04/21  1655   NA  --  136   K  --  3.6   CL  --  104   CO2  --  27   GLU  --  248*   BUN  --  20   CREA  --  0.98   CA  --  8.8   ALB  --  3.5   TBILI  --  0.7   ALT  --  27   INR 1.0  --        Recent Labs     01/05/21  0150 01/04/21  1900 01/04/21  1655   TROIQ 2.41* 1.59* 0.87*       No intake or output data in the 24 hours ending 01/05/21 0823     Cardiographics    Telemetry:   SR 80    ECG:   Sinus tachycardia ventricular rate 113    Left heart cath 2/21/13:  --  CARDIAC STRUCTURES:   --  EF calculated by contrast ventriculography was 60 %. --  CORONARY CIRCULATION:   --  Coronary angiography demonstrated minor luminal irregularities. --  Proximal LAD: There was a 20 % stenosis. --  DIAGNOSTIC PROCEDURES:   --  Right radial artery access. The puncture site was infiltrated with 2 %   lidocaine. The vessel was accessed using the Seldinger technique, a wire   was threaded into the vessel, and a catheter was advanced over the wire   into the vessel. Assessment:            Active Problems:    NSTEMI (non-ST elevated myocardial infarction) ([facility] (2/20/2013)      SIRS (systemic inflammatory response syndrome) ([facility] (1/4/2021)      Asthma exacerbation (1/4/2021)         Plan:     Natasha Guerrero is a pleasant 77year old female with hx of DM, hyperlipidemia, HTN,  non-obstructive CAD per left heart cath 2013, asthma who presented to ED with chest tightness, progressing. Trop trending up with multiple risk factors for CAD. Fasting lipids pending. She is for echo and cardiac cath today. She is a candidate for  Left heart cath with Dr Joseph Harper. I discussed the risks/benefits/alternatives of the procedure with the patient. Risks include (but are not limited to) bleeding, infection, cva/mi/death. The patient understands and would like to proceed. Thank you for this interesting consultation. Eliana Antonio ANP    Patient seen and examined by me with nurse practitioner. I personally performed all components of the history, physical, and medical decision making and agree with the assessment and plan with minor modifications as noted. Pt with band like chest tightness with NSTEMI. Cont bb and heparin gtt. For LHC/possible PCI this am. D/w Dr Joseph Harper. I discussed the risks/benefits/alternatives of the procedure with the patient. Risks include (but are not limited to) bleeding, heart block, infection, cva/mi/tamponade/death.  The patient understands and agrees to proceed. Thank you for this interesting consultation.       Corby Draper MD, Duglas Huston

## 2021-01-05 NOTE — PROGRESS NOTES
1120 Received report from Alexei, [facility] on Ortho. 1125 Patient arrived to room from [facility]. R radial cath site CDI, no hematoma. TR band patent at 12cc. Pulses palpable. Denies pain, SOB, or dizziness. 1852 Missed dinner insulin dose. Recheck , 485. Will notify MD for insulin orders. 1900 Report given to Rohith Trujillo RN - she will perfect serve NP about insulin orders.

## 2021-01-06 ENCOUNTER — TRANSCRIBE ORDER (OUTPATIENT)
Dept: CARDIAC REHAB | Age: 76
End: 2021-01-06

## 2021-01-06 DIAGNOSIS — I21.4 NSTEMI (NON-ST ELEVATION MYOCARDIAL INFARCTION) (HCC): Primary | ICD-10-CM

## 2021-01-06 LAB
ALBUMIN SERPL-MCNC: 3.4 G/DL (ref 3.5–5)
ALBUMIN/GLOB SERPL: 1 {RATIO} (ref 1.1–2.2)
ALP SERPL-CCNC: 72 U/L (ref 45–117)
ALT SERPL-CCNC: 34 U/L (ref 12–78)
ANION GAP SERPL CALC-SCNC: 7 MMOL/L (ref 5–15)
AST SERPL-CCNC: 28 U/L (ref 15–37)
BASOPHILS # BLD: 0 K/UL (ref 0–0.1)
BASOPHILS NFR BLD: 0 % (ref 0–1)
BILIRUB SERPL-MCNC: 0.6 MG/DL (ref 0.2–1)
BUN SERPL-MCNC: 33 MG/DL (ref 6–20)
BUN/CREAT SERPL: 29 (ref 12–20)
CALCIUM SERPL-MCNC: 8.8 MG/DL (ref 8.5–10.1)
CHLORIDE SERPL-SCNC: 104 MMOL/L (ref 97–108)
CO2 SERPL-SCNC: 24 MMOL/L (ref 21–32)
CREAT SERPL-MCNC: 1.15 MG/DL (ref 0.55–1.02)
DIFFERENTIAL METHOD BLD: ABNORMAL
EOSINOPHIL # BLD: 0 K/UL (ref 0–0.4)
EOSINOPHIL NFR BLD: 0 % (ref 0–7)
ERYTHROCYTE [DISTWIDTH] IN BLOOD BY AUTOMATED COUNT: 12.6 % (ref 11.5–14.5)
GLOBULIN SER CALC-MCNC: 3.5 G/DL (ref 2–4)
GLUCOSE BLD STRIP.AUTO-MCNC: 222 MG/DL (ref 65–100)
GLUCOSE BLD STRIP.AUTO-MCNC: 322 MG/DL (ref 65–100)
GLUCOSE BLD STRIP.AUTO-MCNC: 366 MG/DL (ref 65–100)
GLUCOSE BLD STRIP.AUTO-MCNC: 394 MG/DL (ref 65–100)
GLUCOSE SERPL-MCNC: 315 MG/DL (ref 65–100)
HCT VFR BLD AUTO: 34 % (ref 35–47)
HGB BLD-MCNC: 11.1 G/DL (ref 11.5–16)
IMM GRANULOCYTES # BLD AUTO: 0.1 K/UL (ref 0–0.04)
IMM GRANULOCYTES NFR BLD AUTO: 1 % (ref 0–0.5)
LYMPHOCYTES # BLD: 0.7 K/UL (ref 0.8–3.5)
LYMPHOCYTES NFR BLD: 6 % (ref 12–49)
MCH RBC QN AUTO: 30.1 PG (ref 26–34)
MCHC RBC AUTO-ENTMCNC: 32.6 G/DL (ref 30–36.5)
MCV RBC AUTO: 92.1 FL (ref 80–99)
MONOCYTES # BLD: 0.4 K/UL (ref 0–1)
MONOCYTES NFR BLD: 4 % (ref 5–13)
NEUTS SEG # BLD: 10.5 K/UL (ref 1.8–8)
NEUTS SEG NFR BLD: 89 % (ref 32–75)
NRBC # BLD: 0 K/UL (ref 0–0.01)
NRBC BLD-RTO: 0 PER 100 WBC
PLATELET # BLD AUTO: 169 K/UL (ref 150–400)
PMV BLD AUTO: 11.1 FL (ref 8.9–12.9)
POTASSIUM SERPL-SCNC: 3.7 MMOL/L (ref 3.5–5.1)
PROT SERPL-MCNC: 6.9 G/DL (ref 6.4–8.2)
RBC # BLD AUTO: 3.69 M/UL (ref 3.8–5.2)
SERVICE CMNT-IMP: ABNORMAL
SODIUM SERPL-SCNC: 135 MMOL/L (ref 136–145)
WBC # BLD AUTO: 11.8 K/UL (ref 3.6–11)

## 2021-01-06 PROCEDURE — 74011636637 HC RX REV CODE- 636/637: Performed by: NURSE PRACTITIONER

## 2021-01-06 PROCEDURE — 74011250636 HC RX REV CODE- 250/636: Performed by: STUDENT IN AN ORGANIZED HEALTH CARE EDUCATION/TRAINING PROGRAM

## 2021-01-06 PROCEDURE — 74011636637 HC RX REV CODE- 636/637: Performed by: STUDENT IN AN ORGANIZED HEALTH CARE EDUCATION/TRAINING PROGRAM

## 2021-01-06 PROCEDURE — 36415 COLL VENOUS BLD VENIPUNCTURE: CPT

## 2021-01-06 PROCEDURE — 94640 AIRWAY INHALATION TREATMENT: CPT

## 2021-01-06 PROCEDURE — 85025 COMPLETE CBC W/AUTO DIFF WBC: CPT

## 2021-01-06 PROCEDURE — 99233 SBSQ HOSP IP/OBS HIGH 50: CPT | Performed by: INTERNAL MEDICINE

## 2021-01-06 PROCEDURE — 74011250637 HC RX REV CODE- 250/637: Performed by: STUDENT IN AN ORGANIZED HEALTH CARE EDUCATION/TRAINING PROGRAM

## 2021-01-06 PROCEDURE — 65660000000 HC RM CCU STEPDOWN

## 2021-01-06 PROCEDURE — 82962 GLUCOSE BLOOD TEST: CPT

## 2021-01-06 PROCEDURE — 80053 COMPREHEN METABOLIC PANEL: CPT

## 2021-01-06 PROCEDURE — 74011250637 HC RX REV CODE- 250/637: Performed by: NURSE PRACTITIONER

## 2021-01-06 PROCEDURE — 74011000250 HC RX REV CODE- 250: Performed by: STUDENT IN AN ORGANIZED HEALTH CARE EDUCATION/TRAINING PROGRAM

## 2021-01-06 PROCEDURE — 74011636637 HC RX REV CODE- 636/637: Performed by: INTERNAL MEDICINE

## 2021-01-06 RX ORDER — INSULIN LISPRO 100 [IU]/ML
6 INJECTION, SOLUTION INTRAVENOUS; SUBCUTANEOUS ONCE
Status: COMPLETED | OUTPATIENT
Start: 2021-01-06 | End: 2021-01-06

## 2021-01-06 RX ORDER — DEXTROSE 50 % IN WATER (D50W) INTRAVENOUS SYRINGE
12.5-25 AS NEEDED
Status: DISCONTINUED | OUTPATIENT
Start: 2021-01-06 | End: 2021-01-06 | Stop reason: SDUPTHER

## 2021-01-06 RX ORDER — LOSARTAN POTASSIUM 25 MG/1
25 TABLET ORAL DAILY
Status: DISCONTINUED | OUTPATIENT
Start: 2021-01-06 | End: 2021-01-07 | Stop reason: HOSPADM

## 2021-01-06 RX ORDER — ALPRAZOLAM 0.5 MG/1
0.5 TABLET ORAL
Status: DISCONTINUED | OUTPATIENT
Start: 2021-01-06 | End: 2021-01-06

## 2021-01-06 RX ORDER — INSULIN LISPRO 100 [IU]/ML
INJECTION, SOLUTION INTRAVENOUS; SUBCUTANEOUS
Status: DISCONTINUED | OUTPATIENT
Start: 2021-01-06 | End: 2021-01-07 | Stop reason: HOSPADM

## 2021-01-06 RX ORDER — MAGNESIUM SULFATE 100 %
4 CRYSTALS MISCELLANEOUS AS NEEDED
Status: DISCONTINUED | OUTPATIENT
Start: 2021-01-06 | End: 2021-01-06 | Stop reason: SDUPTHER

## 2021-01-06 RX ORDER — INSULIN GLARGINE 100 [IU]/ML
5 INJECTION, SOLUTION SUBCUTANEOUS 2 TIMES DAILY
Status: DISCONTINUED | OUTPATIENT
Start: 2021-01-06 | End: 2021-01-07 | Stop reason: HOSPADM

## 2021-01-06 RX ORDER — ASPIRIN 81 MG/1
81 TABLET ORAL DAILY
Status: DISCONTINUED | OUTPATIENT
Start: 2021-01-06 | End: 2021-01-07 | Stop reason: HOSPADM

## 2021-01-06 RX ORDER — VALSARTAN 40 MG/1
40 TABLET ORAL DAILY
Qty: 30 TAB | Refills: 11 | Status: SHIPPED | OUTPATIENT
Start: 2021-01-06 | End: 2021-01-07 | Stop reason: SDUPTHER

## 2021-01-06 RX ORDER — PIOGLITAZONEHYDROCHLORIDE 15 MG/1
15 TABLET ORAL DAILY
Status: DISCONTINUED | OUTPATIENT
Start: 2021-01-07 | End: 2021-01-06

## 2021-01-06 RX ORDER — ALPRAZOLAM 0.5 MG/1
0.5 TABLET ORAL ONCE
Status: COMPLETED | OUTPATIENT
Start: 2021-01-06 | End: 2021-01-06

## 2021-01-06 RX ORDER — FAMOTIDINE 20 MG/1
20 TABLET, FILM COATED ORAL DAILY
Status: DISCONTINUED | OUTPATIENT
Start: 2021-01-07 | End: 2021-01-07 | Stop reason: HOSPADM

## 2021-01-06 RX ORDER — GLIMEPIRIDE 1 MG/1
2 TABLET ORAL
Status: DISCONTINUED | OUTPATIENT
Start: 2021-01-07 | End: 2021-01-07 | Stop reason: HOSPADM

## 2021-01-06 RX ORDER — CARVEDILOL 6.25 MG/1
6.25 TABLET ORAL 2 TIMES DAILY WITH MEALS
Qty: 60 TAB | Refills: 11 | Status: SHIPPED | OUTPATIENT
Start: 2021-01-06 | End: 2021-01-07 | Stop reason: SDUPTHER

## 2021-01-06 RX ADMIN — ALBUTEROL SULFATE 2 PUFF: 90 AEROSOL, METERED RESPIRATORY (INHALATION) at 03:09

## 2021-01-06 RX ADMIN — INSULIN LISPRO 7 UNITS: 100 INJECTION, SOLUTION INTRAVENOUS; SUBCUTANEOUS at 08:36

## 2021-01-06 RX ADMIN — LOSARTAN POTASSIUM 25 MG: 25 TABLET, FILM COATED ORAL at 12:06

## 2021-01-06 RX ADMIN — CARVEDILOL 6.25 MG: 6.25 TABLET, FILM COATED ORAL at 08:37

## 2021-01-06 RX ADMIN — INSULIN LISPRO 6 UNITS: 100 INJECTION, SOLUTION INTRAVENOUS; SUBCUTANEOUS at 22:07

## 2021-01-06 RX ADMIN — INSULIN GLARGINE 5 UNITS: 100 INJECTION, SOLUTION SUBCUTANEOUS at 11:32

## 2021-01-06 RX ADMIN — METHYLPREDNISOLONE SODIUM SUCCINATE 40 MG: 125 INJECTION, POWDER, FOR SOLUTION INTRAMUSCULAR; INTRAVENOUS at 14:17

## 2021-01-06 RX ADMIN — ALPRAZOLAM 0.5 MG: 0.5 TABLET ORAL at 22:07

## 2021-01-06 RX ADMIN — METHYLPREDNISOLONE SODIUM SUCCINATE 40 MG: 125 INJECTION, POWDER, FOR SOLUTION INTRAMUSCULAR; INTRAVENOUS at 05:58

## 2021-01-06 RX ADMIN — BUPROPION HYDROCHLORIDE 300 MG: 150 TABLET, EXTENDED RELEASE ORAL at 08:43

## 2021-01-06 RX ADMIN — ALBUTEROL SULFATE 2 PUFF: 90 AEROSOL, METERED RESPIRATORY (INHALATION) at 08:39

## 2021-01-06 RX ADMIN — ALBUTEROL SULFATE 2 PUFF: 90 AEROSOL, METERED RESPIRATORY (INHALATION) at 20:23

## 2021-01-06 RX ADMIN — ASPIRIN 81 MG: 81 TABLET, COATED ORAL at 12:06

## 2021-01-06 RX ADMIN — INSULIN GLARGINE 5 UNITS: 100 INJECTION, SOLUTION SUBCUTANEOUS at 17:04

## 2021-01-06 RX ADMIN — INSULIN LISPRO 10 UNITS: 100 INJECTION, SOLUTION INTRAVENOUS; SUBCUTANEOUS at 12:06

## 2021-01-06 RX ADMIN — CARVEDILOL 6.25 MG: 6.25 TABLET, FILM COATED ORAL at 17:04

## 2021-01-06 RX ADMIN — METHYLPREDNISOLONE SODIUM SUCCINATE 40 MG: 125 INJECTION, POWDER, FOR SOLUTION INTRAMUSCULAR; INTRAVENOUS at 21:00

## 2021-01-06 RX ADMIN — ALBUTEROL SULFATE 2 PUFF: 90 AEROSOL, METERED RESPIRATORY (INHALATION) at 11:33

## 2021-01-06 RX ADMIN — ATORVASTATIN CALCIUM 20 MG: 20 TABLET, FILM COATED ORAL at 08:37

## 2021-01-06 RX ADMIN — FAMOTIDINE 20 MG: 20 TABLET, FILM COATED ORAL at 08:38

## 2021-01-06 RX ADMIN — CYANOCOBALAMIN TAB 500 MCG 1000 MCG: 500 TAB at 08:37

## 2021-01-06 RX ADMIN — SERTRALINE 150 MG: 50 TABLET, FILM COATED ORAL at 08:38

## 2021-01-06 RX ADMIN — ALBUTEROL SULFATE 2 PUFF: 90 AEROSOL, METERED RESPIRATORY (INHALATION) at 16:00

## 2021-01-06 RX ADMIN — INSULIN LISPRO 4 UNITS: 100 INJECTION, SOLUTION INTRAVENOUS; SUBCUTANEOUS at 17:03

## 2021-01-06 RX ADMIN — BUDESONIDE 500 MCG: 0.5 INHALANT RESPIRATORY (INHALATION) at 20:26

## 2021-01-06 NOTE — DISCHARGE INSTRUCTIONS
[facility]  [address] [place] [address]  [phone]        Patient ID:  Wendi Stack  [id number]  52 y.o.  1945    Admit Date: 1/4/2021    Discharge Date: 1/6/2021     Admission Diagnoses:   NSTEMI (non-ST elevated myocardial infarction) ([facility] [address] [I21.4]  Asthma exacerbation [J45.901]  SIRS (systemic inflammatory response syndrome) ([facility] [R65.10]    Discharge Diagnoses:   Principal Problem:    NSTEMI (non-ST elevated myocardial infarction) ([facility] (2/20/2013)    Active Problems:    DM (diabetes mellitus) ([facility] ()      [facility] (1/4/2021)      Asthma exacerbation (1/4/2021)      Takotsubo cardiomyopathy (1/5/2021)      S/P cardiac cath (1/5/2021)      Overview: 1/5/2021 normal cors              Discharge Condition: Good    Cardiology Procedures this Admission:  Diagnostic left heart catheterization  EchoCardiogram    Disposition: home    Reference discharge instructions provided by nursing for diet and activity. Signed:  Teto Guzman NP  1/6/2021  1:10 PM        Radial Cardiac Catheterization/Angiography Discharge Instructions    It is normal to feel tired the first couple days. Take it easy and follow the physicians instructions. CHECK THE CATHETER INSERTION SITE DAILY:    Remove the wrist dressing 24 hours after the procedure. You may shower 24 hours after the procedure. Wash with soap and water and pat dry. Gentle cleaning of the site with soap and water is sufficient, cover with a dry clean dressing or bandage. Do not apply creams or powders to the area. No soaking the wrist for 3 days. Leave the puncture site open to air after 24 hours post-procedure. CALL THE PHYSICIANS:     If the site becomes red, swollen or feels warm to the touch  If there is bleeding or drainage or if there is unusual pain at the radial site.      If there is any minor oozing, you may apply a band-aid and remove after 12 hours. If the bleeding continues, hold pressure with the middle finger against the puncture site and the thumb against the back of the wrist,call 911 to be transported to the hospital.  DO NOT DRIVE YOURSELF, Flo Mensah9. ACTIVITY:   For the first 24 hours do not manipulate the wrist.  No lifting, pushing or pulling over 3-5 pounds with the affected wrist for 7 daysand no straining the insertion site. Do not life grocery bags or the garbage can, do not run the vacuum  or  for 7 days. Start with short walks as in the hospital and gradually increase as tolerated each day. It is recommended to walk 30 minutes 5-7 days per week. Follow your physicians instructions on activity. Avoid walking outside in extremes of heat or cold. Walk inside when it is cold and windy or hot and humid. Things to keep in mind:  No driving for at least 24 hours, or as designated by your physician. Limit the number of times you go up and down the stairs  Take rests and pace yourself with activity. Be careful and do not strain with bowel movements. MEDICATIONS:    Take all medications as prescribed  Call your physician if you have any questions  Keep an updated list of your medications with you at all times and give a list to your physician and pharmacist    SIGNS AND SYMPTOMS:   Be cautious of symptoms of angina or recurrent symptoms such as chest discomfort, unusual shortness of breath or fatigue. These could be symptoms of restenosis, a new blockage or a heart attack. If your symptoms are relieved with rest it is still recommended that you notify your physician of recurrent chest pain or discomfort. For CHEST PAIN or symptoms of angina not relieved with rest:  If the discomfort is not relieved with rest, and you have been prescribed Nitroglycerin, take as directed (taken under the tongue, one at a time 5 minutes apart for a total of 3 doses).  If the discomfort is not relieved after the 3rd nitroglycerin, call 911. If you have not been prescribed Nitroglycerin  and your chest discomfort is not relieved with rest, call 911. AFTER CARE:   Follow up with your physician as instructed. Follow a heart healthy diet with proper portion control, daily stress management, daily exercise, blood pressure and cholesterol control , and smoking cessation.

## 2021-01-06 NOTE — PROGRESS NOTES
07:00 -Bedside report rec'd from Alfonso Duval RN.      11:42 - . Orders rec'd for 10 units SSI.    14:17 - Pt with SOB at rest, O2 sat 94% on RA. Wheezes audible. IV Solumedrol given per orders. Will continue to monitor. 18:24 - Patient states she was told she'd be starting an abx for UTI (culture is preliminary as of this time), message sent to MD.    18:34 - MD will re-eval in AM if she needs abx. End of Shift Note    Bedside shift change report given to Nicho Hernandes RN (oncoming nurse) by Saul Baker RN (offgoing nurse). Report included the following information SBAR, Kardex, Intake/Output, Recent Results and Cardiac Rhythm NSR    Shift worked:  7a-7p     Shift summary and any significant changes:     Intermittent wheezing, SOB. Lifevest prior to DC, arrived just prior to change of shift      Concerns for physician to address:  Blood sugar control, possible abx for UTI? Zone phone for oncoming shift:   [id number]       Activity:  Activity Level: Up ad xavi  Number times ambulated in hallways past shift: 0  Number of times OOB to chair past shift: 1    Cardiac:   Cardiac Monitoring: Yes      Cardiac Rhythm: Normal sinus rhythm    Access:   Current line(s): PIV     Genitourinary:   Urinary status: voiding    Respiratory:   O2 Device: Room air  Chronic home O2 use?: NO  Incentive spirometer at bedside: NO     GI:     Current diet:  DIET ONE TIME MESSAGE  DIET DIABETIC CONSISTENT CARB Regular; AHA-LOW-CHOL FAT  Passing flatus: YES  Tolerating current diet: YES  % Diet Eaten: 75 %    Pain Management:   Patient states pain is manageable on current regimen: YES    Skin:  Cody Score: 23  Interventions: increase time out of bed and limit briefs    Patient Safety:  Fall Score:  Total Score: 2  Interventions: gripper socks, pt to call before getting OOB and stay with me (per policy)       Length of Stay:  Expected LOS: 2d 14h  Actual LOS: [address], RN

## 2021-01-06 NOTE — ROUTINE PROCESS
Perfect serve message has been sent to night shift NP due to elevated blood glucose and positive nitrates in UA.

## 2021-01-06 NOTE — PROGRESS NOTES
ADULT PROTOCOL: JET AEROSOL ASSESSMENT    Patient  Ava Mckee     77 y.o.   female     1/5/2021  10:19 PM    Breath Sounds Pre Procedure: Right Breath Sounds: Diminished                               Left Breath Sounds: Diminished    Breath Sounds Post Procedure: Right Breath Sounds: Diminished                                 Left Breath Sounds: Diminished    Breathing pattern: Pre procedure Breathing Pattern: Regular          Post procedure Breathing Pattern: Regular    Heart Rate: Pre procedure Pulse: 73           Post procedure Pulse: 74    Resp Rate: Pre procedure Respirations: 18           Post procedure Respirations: 18          Cough: Pre procedure Cough: Non-productive               Post procedure Cough: Non-productive    Oxygen: O2 Device: Room air   FiO2 (%) rm air   21%     Changed: NO    SpO2: Pre procedure SpO2: 98 %   without oxygen              Post procedure SpO2: 98 %  without oxygen    Nebulizer Therapy: Current medications Aerosolized Medications: Pulmicort      Changed: NO    Problem List:   Patient Active Problem List   Diagnosis Code    DM (diabetes mellitus) ([email or web address] E11.9    Other and unspecified hyperlipidemia E78.5    Unspecified essential hypertension I10    Acute respiratory failure ([email or web address] J96.00    Unspecified asthma, with exacerbation J45. 0    NSTEMI (non-ST elevated myocardial infarction) ([email or web address] I21.4    CAD (coronary artery disease), native coronary artery I25.10    Hyperlipidemia LDL goal < 70 E78.5    Respiratory failure (HCC) J96.90    SIRS (systemic inflammatory response syndrome) ([facility]) R65.10    Asthma exacerbation J45. 0    Takotsubo cardiomyopathy I51.81    S/P cardiac cath Z98.890       Respiratory Therapist: Suma Jackson RT

## 2021-01-06 NOTE — PROGRESS NOTES
Received notification from bedside RN about patient with regards to: , needs insulin coverage order    Intervention given: Lispro 13 units SQ x 1 ordered

## 2021-01-06 NOTE — PROGRESS NOTES
Hospitalist Progress Note    NAME: Noreen Villarreal   :  1945   MRN:  [id number]       Assessment / Plan:  Chest pain secondary to NSTEMI  Patient is s/p cath now , dong well. Coronaries clean , continue Asprin,and statins   Cardiology help appreciated   Shortness of breath secondary to asthma COPD exacerbation  CXR - No acute cardiopulmonary disease. Covid PCR negative   Portable every 4 hours, Pulmicort twice daily  Continue on azithromycin, Solu-Medrol 40 every 8 IV. Currently on room air. Patient home medication is Trelegy  Diabetes type 2 with hyperglycemia, uncontrolled   HbA1c 8.0 , on sliding scale high dose  insulin. added lantus 5 u bid   Continue current medications   Hypertension  Hyperlipidemia  Anxiety disorder, xanax added  GERD  Depression  Continue home medications. Lipid panel reviewed   Code Status: Full code  Surrogate Decision Maker:   DVT Prophylaxis: s/p cath   GI Prophylaxis: not indicated  Baseline: Ambulatory at home, may Dc tomorrow if respiration gets better. Body mass index is 30.73 kg/m².: 30.0 - 39.9 Obese     Subjective:     Chief Complaint / Reason for Physician Visit  Patient getting much better now, just some sob \". Discussed with RN events overnight. Review of Systems:  Symptom Y/N Comments  Symptom Y/N Comments   Fever/Chills n   Chest Pain n    Poor Appetite    Edema     Cough    Abdominal Pain n    Sputum    Joint Pain     SOB/CURTIS y   Pruritis/Rash     Nausea/vomit    Tolerating PT/OT     Diarrhea n   Tolerating Diet     Constipation n   Other       Could NOT obtain due to:      Objective:     VITALS:   Last 24hrs VS reviewed since prior progress note.  Most recent are:  Patient Vitals for the past 24 hrs:   Temp Pulse Resp BP SpO2   21 0400     96 %   21 0309     92 %   21 2338 98 °F (36.7 °C) 72 18 126/70 95 %   21 2211     98 %   21 2207     98 %   21  76  (!) 122/56    21 97.6 °F (36.4 °C) 74 20 (!) 122/56 94 %   01/05/21 1836  79  (!) 120/57    01/05/21 1545  79  (!) 89/60    01/05/21 1530  75  107/75    01/05/21 1419  81 20 115/62    01/05/21 1400  68 17 (!) 110/54    01/05/21 1345  67 18 (!) 112/58    01/05/21 1330  71 18 118/69 92 %   01/05/21 1325  72 18 125/69 92 %   01/05/21 1320  73 17 132/79 93 %   01/05/21 1315  79 22 133/69 95 %   01/05/21 1300  86 23 130/88 93 %   01/05/21 1230  76  122/84    01/05/21 1200  72  96/66    01/05/21 1130  74  120/67    01/05/21 1125 98.1 °F (36.7 °C) 75 17 112/66 93 %   01/05/21 0917    119/69    01/05/21 0801 97.7 °F (36.5 °C) 81 16 119/69 96 %   01/05/21 0800  81          Intake/Output Summary (Last 24 hours) at 1/6/2021 0756  Last data filed at 1/5/2021 2207  Gross per 24 hour   Intake 1450 ml   Output    Net 1450 ml        PHYSICAL EXAM:  General: WD, WN. Alert, cooperative, no acute distress  EENT:  EOMI. Anicteric sclerae. MMM  Resp:  CTA bilaterally, no wheezing or rales. No accessory muscle use  CV:  Regular  rhythm,  No edema  GI:  Soft, Non distended, Non tender.  +Bowel sounds  Neurologic:  Alert and oriented X 3, normal speech,   Psych:   Good insight. Not anxious nor agitated  Skin:  No rashes. No jaundice    Reviewed most current lab test results and cultures  YES  Reviewed most current radiology test results   YES  Review and summation of old records today    NO  Reviewed patient's current orders and MAR    YES  PMH/SH reviewed - no change compared to H&P  ________________________________________________________________________  Care Plan discussed with:    Comments   Patient y    Family      RN y    Care Manager     Consultant                        Multidiciplinary team rounds were held today with , nursing, pharmacist and clinical coordinator. Patient's plan of care was discussed; medications were reviewed and discharge planning was addressed. ________________________________________________________________________  Total NON critical care TIME:  35  Minutes    Total CRITICAL CARE TIME Spent:   Minutes non procedure based      Comments   >50% of visit spent in counseling and coordination of care     ________________________________________________________________________  Navdeep Dumont MD     Procedures: see electronic medical records for all procedures/Xrays and details which were not copied into this note but were reviewed prior to creation of Plan. LABS:  I reviewed today's most current labs and imaging studies. Pertinent labs include:  Recent Labs     01/06/21 0620 01/04/21  1655   WBC 11.8* 9.1   HGB 11.1* 11.9   HCT 34.0* 36.5    151     Recent Labs     01/06/21  0620 01/04/21  1823 01/04/21  1655   *  --  136   K 3.7  --  3.6     --  104   CO2 24  --  27   *  --  248*   BUN 33*  --  20   CREA 1.15*  --  0.98   CA 8.8  --  8.8   ALB 3.4*  --  3.5   TBILI 0.6  --  0.7   ALT 34  --  27   INR  --  1.0  --        Signed:  Navdeep Dumont MD

## 2021-01-06 NOTE — PROGRESS NOTES
Renal Dosing/Monitoring  Medication: Famotidine   Current regimen:  20 mg every 12 hr  Recent Labs     01/06/21  0620 01/04/21  1655   CREA 1.15* 0.98   BUN 33* 20     Estimated CrCl:  37 mL/min  Plan: Change to famotidine 20 mg daily for CrCl < 50 mL/min per renal dosing protocol.        Thank you,   Afshan Balderas, PHARMD

## 2021-01-06 NOTE — PROGRESS NOTES
[address], [facility], [address] [facility]  [phone]      Cardiology Progress Note      1/6/2021 11:37 AM    Admit Date: 1/4/2021    Admit Diagnosis:   NSTEMI (non-ST elevated myocardial infarction) ([address] [I21.4]  Asthma exacerbation [J45.901]  SIRS (systemic inflammatory response syndrome) ([facility]) [R65.10]    Subjective:     Binu Nath has no c/o CP, SOB. She is s/p cardiac cath yesterday with normal coronaries and a new dx of Takotsubo CM. EF 25-30%. She is agreeable to wearing Lifevest x 3 months. Order placed and hopefully to be fitted today.      Visit Vitals  /62 (BP Patient Position: Sitting)   Pulse 74   Temp 97.4 °F (36.3 °C)   Resp 24   Ht 5' 4\" (1.626 m)   Wt 179 lb (81.2 kg)   SpO2 95%   BMI 30.73 kg/m²       Current Facility-Administered Medications   Medication Dose Route Frequency    insulin glargine (LANTUS) injection 5 Units  5 Units SubCUTAneous BID    insulin lispro (HUMALOG) injection   SubCUTAneous AC&HS    aspirin delayed-release tablet 81 mg  81 mg Oral DAILY    losartan (COZAAR) tablet 25 mg  25 mg Oral DAILY    [START ON 1/7/2021] glimepiride (AMARYL) tablet 2 mg  2 mg Oral 7am    [START ON 1/7/2021] pioglitazone (ACTOS) tablet 15 mg  15 mg Oral DAILY    0.9% sodium chloride infusion  50 mL/hr IntraVENous CONTINUOUS    carvediloL (COREG) tablet 6.25 mg  6.25 mg Oral BID WITH MEALS    nitroglycerin (NITROSTAT) tablet 0.4 mg  0.4 mg SubLINGual Q5MIN PRN    albuterol (PROVENTIL HFA, VENTOLIN HFA, PROAIR HFA) inhaler 1-2 Puff  1-2 Puff Inhalation Q4H RT    atorvastatin (LIPITOR) tablet 20 mg  20 mg Oral DAILY    buPROPion XL (WELLBUTRIN XL) tablet 300 mg  300 mg Oral DAILY    sertraline (ZOLOFT) tablet 150 mg  150 mg Oral DAILY    cyanocobalamin (VITAMIN B12) tablet 1,000 mcg  1,000 mcg Oral DAILY    famotidine (PEPCID) tablet 20 mg  20 mg Oral BID    methylPREDNISolone (PF) (Solu-MEDROL) injection 40 mg  40 mg IntraVENous Q8H    glucose chewable tablet 16 g  4 Tab Oral PRN    dextrose (D50W) injection syrg 12.5-25 g  12.5-25 g IntraVENous PRN    glucagon (GLUCAGEN) injection 1 mg  1 mg IntraMUSCular PRN    budesonide (PULMICORT) 500 mcg/2 ml nebulizer suspension  500 mcg Nebulization BID RT       Objective:      Physical Exam:  General Appearance:  WNWD older  female in no acute distress  Chest:   Clear  Cardiovascular:  Regular rate and rhythm, no murmur. Abdomen:   Soft, non-tender, bowel sounds are active. Extremities: no peripheral edema; right radial site D/I, no hematoma  Skin:  Warm and dry. Data Review:   Recent Labs     01/06/21  0620 01/04/21  1655   WBC 11.8* 9.1   HGB 11.1* 11.9   HCT 34.0* 36.5    151     Recent Labs     01/06/21  0620 01/04/21  1823 01/04/21  1655   *  --  136   K 3.7  --  3.6     --  104   CO2 24  --  27   *  --  248*   BUN 33*  --  20   CREA 1.15*  --  0.98   CA 8.8  --  8.8   ALB 3.4*  --  3.5   TBILI 0.6  --  0.7   ALT 34  --  27   INR  --  1.0  --        Recent Labs     01/05/21  0150 01/04/21  1900 01/04/21  1655   TROIQ 2.41* 1.59* 0.87*         Intake/Output Summary (Last 24 hours) at 1/6/2021 1440  Last data filed at 1/6/2021 0400  Gross per 24 hour   Intake 990 ml   Output    Net 990 ml        Telemetry: SR    Echo 1/5/2021  · LV: Estimated LVEF is 25 - 30%. Normal cavity size. Mild concentric hypertrophy. Moderate-to-severely reduced systolic function. Age-appropriate left ventricular diastolic function. · There is severe hypokinesis of the mid to distal portions of all walls, dyskinesis of the apex, and relatively normal function of the basal segments.        Assessment:     Principal Problem:    NSTEMI (non-ST elevated myocardial infarction) ([email or web address] (2/20/2013)      Overview: Recurrent January 2021    Active Problems:    DM (diabetes mellitus) ([address] ()      SIRS (systemic inflammatory response syndrome) ([facility] (1/4/2021)      Asthma exacerbation (1/4/2021)      Takotsubo cardiomyopathy (1/5/2021)      S/P cardiac cath (1/5/2021)      Overview: 1/5/2021 normal cors              Plan:     NSTEMI:  S/p cardiac cath with normal cors  Elevated troponin likely r/t Takotsubo  Continue with ASA, Coreg, statin    Takotsubo Cardiomyopathy, EF 25-30%:  NTproBNP 266  Stable, not volume overloaded, Cxray clear  Started on Coreg 6.25mg BID. Had been on ACEI but discontinued due to cough. Starting Losartan as inpatient only as there is no Valsartan available. Will start Valsartan at discharge  High risk for sudden cardiac death. Lifevest recommended, request to be sent and will likely be fit today. She will wear the Lifevest x 3 months, then will have a repeat echo. If EF <35%, will recommend AICD. Provided patient educational information reference management of heart failure. OK for discharge with f/u to Dr. Sukumar Leung 1-2 weeks. Mirna Villalpando [facility]    Patient seen and examined by me with the above nurse practitioner. I personally performed all components of the history, physical, and medical decision making and agree with the assessment and plan with minor modifications as noted. High risk for sudden cardiac death. Lifevest recommended, request to be sent and will likely be fit today. She will wear the Lifevest x 3 months, then will have a repeat echo. If EF <35%, will recommend AICD.     Cardiology

## 2021-01-06 NOTE — PROGRESS NOTES
Received notification from bedside RN about patient with regards to: patient having some increase WOB and anxiety. VS: /56, HR 74, RR 20, O2 sat 98% on RA    Intervention given:Assessed patient at bedside, just recently received her inhaler and is breathing better.  Requesting anxiety medication to help her relax, taking prn Xanax at home  - ordered Alprazolam 0.5 mg x 1 dose

## 2021-01-07 VITALS
HEIGHT: 64 IN | RESPIRATION RATE: 20 BRPM | SYSTOLIC BLOOD PRESSURE: 134 MMHG | TEMPERATURE: 97.6 F | OXYGEN SATURATION: 95 % | DIASTOLIC BLOOD PRESSURE: 55 MMHG | WEIGHT: 179 LBS | BODY MASS INDEX: 30.56 KG/M2 | HEART RATE: 70 BPM

## 2021-01-07 LAB
ALBUMIN SERPL-MCNC: 3.3 G/DL (ref 3.5–5)
ALBUMIN/GLOB SERPL: 1 {RATIO} (ref 1.1–2.2)
ALP SERPL-CCNC: 68 U/L (ref 45–117)
ALT SERPL-CCNC: 30 U/L (ref 12–78)
ANION GAP SERPL CALC-SCNC: 6 MMOL/L (ref 5–15)
AST SERPL-CCNC: 20 U/L (ref 15–37)
BASOPHILS # BLD: 0 K/UL (ref 0–0.1)
BASOPHILS NFR BLD: 0 % (ref 0–1)
BILIRUB SERPL-MCNC: 0.7 MG/DL (ref 0.2–1)
BUN SERPL-MCNC: 29 MG/DL (ref 6–20)
BUN/CREAT SERPL: 33 (ref 12–20)
CALCIUM SERPL-MCNC: 8.6 MG/DL (ref 8.5–10.1)
CHLORIDE SERPL-SCNC: 105 MMOL/L (ref 97–108)
CO2 SERPL-SCNC: 25 MMOL/L (ref 21–32)
CREAT SERPL-MCNC: 0.89 MG/DL (ref 0.55–1.02)
DIFFERENTIAL METHOD BLD: ABNORMAL
EOSINOPHIL # BLD: 0 K/UL (ref 0–0.4)
EOSINOPHIL NFR BLD: 0 % (ref 0–7)
ERYTHROCYTE [DISTWIDTH] IN BLOOD BY AUTOMATED COUNT: 12.4 % (ref 11.5–14.5)
GLOBULIN SER CALC-MCNC: 3.3 G/DL (ref 2–4)
GLUCOSE BLD STRIP.AUTO-MCNC: 269 MG/DL (ref 65–100)
GLUCOSE SERPL-MCNC: 231 MG/DL (ref 65–100)
HCT VFR BLD AUTO: 32.6 % (ref 35–47)
HGB BLD-MCNC: 10.5 G/DL (ref 11.5–16)
IMM GRANULOCYTES # BLD AUTO: 0.1 K/UL (ref 0–0.04)
IMM GRANULOCYTES NFR BLD AUTO: 1 % (ref 0–0.5)
LYMPHOCYTES # BLD: 0.6 K/UL (ref 0.8–3.5)
LYMPHOCYTES NFR BLD: 7 % (ref 12–49)
MCH RBC QN AUTO: 29.6 PG (ref 26–34)
MCHC RBC AUTO-ENTMCNC: 32.2 G/DL (ref 30–36.5)
MCV RBC AUTO: 91.8 FL (ref 80–99)
MONOCYTES # BLD: 0.3 K/UL (ref 0–1)
MONOCYTES NFR BLD: 4 % (ref 5–13)
NEUTS SEG # BLD: 7.4 K/UL (ref 1.8–8)
NEUTS SEG NFR BLD: 88 % (ref 32–75)
NRBC # BLD: 0 K/UL (ref 0–0.01)
NRBC BLD-RTO: 0 PER 100 WBC
PLATELET # BLD AUTO: 165 K/UL (ref 150–400)
PMV BLD AUTO: 11.5 FL (ref 8.9–12.9)
POTASSIUM SERPL-SCNC: 3.8 MMOL/L (ref 3.5–5.1)
PROT SERPL-MCNC: 6.6 G/DL (ref 6.4–8.2)
RBC # BLD AUTO: 3.55 M/UL (ref 3.8–5.2)
SERVICE CMNT-IMP: ABNORMAL
SODIUM SERPL-SCNC: 136 MMOL/L (ref 136–145)
WBC # BLD AUTO: 8.5 K/UL (ref 3.6–11)

## 2021-01-07 PROCEDURE — 74011250636 HC RX REV CODE- 250/636: Performed by: STUDENT IN AN ORGANIZED HEALTH CARE EDUCATION/TRAINING PROGRAM

## 2021-01-07 PROCEDURE — 74011250637 HC RX REV CODE- 250/637: Performed by: NURSE PRACTITIONER

## 2021-01-07 PROCEDURE — 85025 COMPLETE CBC W/AUTO DIFF WBC: CPT

## 2021-01-07 PROCEDURE — 74011636637 HC RX REV CODE- 636/637: Performed by: INTERNAL MEDICINE

## 2021-01-07 PROCEDURE — 74011250637 HC RX REV CODE- 250/637: Performed by: STUDENT IN AN ORGANIZED HEALTH CARE EDUCATION/TRAINING PROGRAM

## 2021-01-07 PROCEDURE — 99233 SBSQ HOSP IP/OBS HIGH 50: CPT | Performed by: INTERNAL MEDICINE

## 2021-01-07 PROCEDURE — 80053 COMPREHEN METABOLIC PANEL: CPT

## 2021-01-07 PROCEDURE — 74011250637 HC RX REV CODE- 250/637: Performed by: INTERNAL MEDICINE

## 2021-01-07 PROCEDURE — 36415 COLL VENOUS BLD VENIPUNCTURE: CPT

## 2021-01-07 PROCEDURE — 94640 AIRWAY INHALATION TREATMENT: CPT

## 2021-01-07 PROCEDURE — 82962 GLUCOSE BLOOD TEST: CPT

## 2021-01-07 RX ORDER — CARVEDILOL 6.25 MG/1
6.25 TABLET ORAL 2 TIMES DAILY WITH MEALS
Qty: 60 TAB | Refills: 11 | Status: SHIPPED | OUTPATIENT
Start: 2021-01-07

## 2021-01-07 RX ORDER — CIPROFLOXACIN 500 MG/1
500 TABLET ORAL 2 TIMES DAILY
Qty: 10 TAB | Refills: 0 | Status: SHIPPED | OUTPATIENT
Start: 2021-01-07 | End: 2021-01-15 | Stop reason: ALTCHOICE

## 2021-01-07 RX ORDER — VALSARTAN 40 MG/1
40 TABLET ORAL DAILY
Qty: 30 TAB | Refills: 11 | Status: SHIPPED | OUTPATIENT
Start: 2021-01-07 | End: 2021-01-15

## 2021-01-07 RX ORDER — PREDNISONE 10 MG/1
10 TABLET ORAL DAILY
Qty: 30 TAB | Refills: 0 | Status: SHIPPED | OUTPATIENT
Start: 2021-01-07

## 2021-01-07 RX ORDER — CIPROFLOXACIN 500 MG/1
500 TABLET ORAL 2 TIMES DAILY
Qty: 10 TAB | Refills: 0 | Status: SHIPPED | OUTPATIENT
Start: 2021-01-07 | End: 2021-01-07 | Stop reason: SDUPTHER

## 2021-01-07 RX ADMIN — ASPIRIN 81 MG: 81 TABLET, COATED ORAL at 08:42

## 2021-01-07 RX ADMIN — SERTRALINE 150 MG: 50 TABLET, FILM COATED ORAL at 08:42

## 2021-01-07 RX ADMIN — INSULIN GLARGINE 5 UNITS: 100 INJECTION, SOLUTION SUBCUTANEOUS at 08:42

## 2021-01-07 RX ADMIN — METHYLPREDNISOLONE SODIUM SUCCINATE 40 MG: 125 INJECTION, POWDER, FOR SOLUTION INTRAMUSCULAR; INTRAVENOUS at 06:14

## 2021-01-07 RX ADMIN — ALBUTEROL SULFATE 2 PUFF: 90 AEROSOL, METERED RESPIRATORY (INHALATION) at 07:22

## 2021-01-07 RX ADMIN — GLIMEPIRIDE 2 MG: 1 TABLET ORAL at 08:41

## 2021-01-07 RX ADMIN — FAMOTIDINE 20 MG: 20 TABLET, FILM COATED ORAL at 08:42

## 2021-01-07 RX ADMIN — LOSARTAN POTASSIUM 25 MG: 25 TABLET, FILM COATED ORAL at 08:42

## 2021-01-07 RX ADMIN — INSULIN LISPRO 7 UNITS: 100 INJECTION, SOLUTION INTRAVENOUS; SUBCUTANEOUS at 08:43

## 2021-01-07 RX ADMIN — BUPROPION HYDROCHLORIDE 300 MG: 150 TABLET, EXTENDED RELEASE ORAL at 08:45

## 2021-01-07 RX ADMIN — CYANOCOBALAMIN TAB 500 MCG 1000 MCG: 500 TAB at 08:42

## 2021-01-07 RX ADMIN — CARVEDILOL 6.25 MG: 6.25 TABLET, FILM COATED ORAL at 08:42

## 2021-01-07 RX ADMIN — ALBUTEROL SULFATE 1 PUFF: 90 AEROSOL, METERED RESPIRATORY (INHALATION) at 00:11

## 2021-01-07 RX ADMIN — ALBUTEROL SULFATE 1 PUFF: 90 AEROSOL, METERED RESPIRATORY (INHALATION) at 03:21

## 2021-01-07 RX ADMIN — ATORVASTATIN CALCIUM 20 MG: 20 TABLET, FILM COATED ORAL at 08:42

## 2021-01-07 NOTE — PROGRESS NOTES
Received notification from bedside RN about patient with regards to: patient getting anxious, requesting anxiety medication   VS: /76, HR 70, RR 24, O2 sat 96% on RA    Intervention given: Xanax 0.5 mg PO x 1 dose ordered    0940: notified of , needs insulin coverage order.  Ordered Lispro 6 units SQ x 1

## 2021-01-07 NOTE — DISCHARGE SUMMARY
Hospitalist Discharge Summary     Patient ID:  Jing Ramsay  [id number]  20 y.o.  1945    PCP on record: Jmae Srivastava MD    Admit date: 1/4/2021  Discharge date and time: 1/7/2021      DISCHARGE DIAGNOSIS:    Chest pain secondary to NSTEMI  UTI       CONSULTATIONS:  IP CONSULT TO CARDIOLOGY    Excerpted HPI from H&P of Shanda Beltran MD:  Jing Ramsay is a 77 y.o.  female who presents with shortness of breath and pressure in the chest that started today after lunch. Patient past medical history of hypertension, asthma, COPD, anxiety disorder, GERD, diabetes type 2, hyperlipidemia. Patient states that today after lunch while she was shopping she started having shortness of breath and sudden tightness in the chest to the point that she fell down, 2 people help the patient to get back and she came to the ER by ambulance. Patient states that the chest tightness is 6/10 in severity located all over the chest, still present, associated with shortness of breath with no nausea and vomiting. Patient denies any diaphoresis, no fever and chills, no history of sick contacts, no dizziness. Patient has longstanding history of dry cough which sometimes get better with syrup. Patient states that she had a similar episode around 10 years ago for which she [facility] cardiac catheterization and it was normal according to the patient. Patient is a non-smoker, sees Dr. Cinthia Odonnell for COPD.       ______________________________________________________________________  DISCHARGE SUMMARY/HOSPITAL COURSE:  for full details see H&P, daily progress notes, labs, consult notes. Chest pain secondary to NSTEMI  Patient is s/p cath ,Coronaries clean , continue Asprin,and statins   Shortness of breath secondary to asthma COPD exacerbation  Discharged   CIPRO for UTI added   Diabetes type 2 with hyperglycemia, uncontrolled   HbA1c 8.0 , on sliding scale high dose  insulin. added lantus 5 u bid   Continue current medications   Hypertension Hyperlipidemia Anxiety disorder,on  xanax   GERD ,Depression  Continue home medications. Lipid panel     _______________________________________________________________________  Patient seen and examined by me on discharge day. Pertinent Findings:  Gen:    Not in distress  Chest: Clear lungs  CVS:   Regular rhythm. No edema  Abd:  Soft, not distended, not tender  Neuro:  Alert, oriented times 4   _______________________________________________________________________  DISCHARGE MEDICATIONS:   Current Discharge Medication List      START taking these medications    Details   valsartan (DIOVAN) 40 mg tablet Take 1 Tab by mouth daily. Qty: 30 Tab, Refills: 11      carvediloL (COREG) 6.25 mg tablet Take 1 Tab by mouth two (2) times daily (with meals). Qty: 60 Tab, Refills: 11      predniSONE (DELTASONE) 10 mg tablet Take 10 mg by mouth daily. 4 tabs for 3 days   3 tabs for 3 days   2 tabs for 3 days  1 tabs for 3 days  Then stop. Qty: 30 Tab, Refills: 0         CONTINUE these medications which have NOT CHANGED    Details   famotidine (Pepcid) 20 mg tablet Take 1 Tab by mouth two (2) times a day. Qty: 20 Tab, Refills: 0      buPROPion XL (WELLBUTRIN XL) 300 mg XL tablet Take 300 mg by mouth every morning. glimepiride (AMARYL) 2 mg tablet Take 2 mg by mouth every morning. atorvastatin (LIPITOR) 20 mg tablet Take 20 mg by mouth daily. aspirin delayed-release 81 mg tablet Take 81 mg by mouth daily. dextromethorphan-guaiFENesin (ROBITUSSIN-DM)  mg/5 mL syrup Take 10 mL by mouth every four (4) hours as needed for Cough. cholecalciferol (VITAMIN D3) 1,000 unit tablet Take 1,000 Units by mouth daily. cyanocobalamin 1,000 mcg tablet Take 1,000 mcg by mouth daily. albuterol-ipratropium (DUONEB) 2.5 mg-0.5 mg/3 ml nebulizer solution 3 mL by Nebulization route every four (4) hours as needed for Wheezing.   Qty: 1 Package      ALPRAZolam (XANAX) 0.5 mg tablet Take 0.5 mg by mouth two (2) times daily as needed. albuterol (PROVENTIL, VENTOLIN) 90 mcg/actuation inhaler Take 1-2 Puffs by inhalation every four (4) hours as needed for Wheezing. Qty: 17 g, Refills: 1      sertraline (ZOLOFT) 100 mg tablet Take 150 mg by mouth daily. STOP taking these medications       lisinopril (PRINIVIL, ZESTRIL) 10 mg tablet Comments:   Reason for Stopping:         pioglitazone (ACTOS) 15 mg tablet Comments:   Reason for Stopping:               My Recommended Diet, Activity, Wound Care, and follow-up labs are listed in the patient's Discharge Insturctions which I have personally completed and reviewed.     ______________________________________________________________________    Risk of deterioration: Moderate    Condition at Discharge:  Stable  ______________________________________________________________________    Disposition  Home with family, no needs    ______________________________________________________________________    Care Plan discussed with:   Patient, Family, RN, Care Manager, Consultant    Comment:   ______________________________________________________________________    Code Status: Full Code  ___

## 2021-01-07 NOTE — PROGRESS NOTES
07:00 - Bedside report rec'd from SANDRA Michel.      09:45 - Discharge instructions given to patient. Verbalizes understanding. PIV and tele removed.

## 2021-01-07 NOTE — PROGRESS NOTES
End of Shift Note    Bedside shift change report given to Anjali (oncoming nurse) by Julio César Muniz (offgoing nurse). Report included the following information SBAR, Intake/Output, Recent Results and Cardiac Rhythm NSR    Shift worked:  7p-7a     Shift summary and any significant changes:     Vitals stable. Patient gets very wheezy, SOB, tachypneic after pulmicort treatment. Hospitalist NP notified. Concerns for physician to address:  reaction to pulmicort     Zone phone for oncoming shift:   [phone]       Activity:  Activity Level: Up ad xavi  Number times ambulated in hallways past shift: 0  Number of times OOB to chair past shift: 2    Cardiac:   Cardiac Monitoring: Yes      Cardiac Rhythm: Normal sinus rhythm    Access:   Current line(s): PIV     Genitourinary:   Urinary status: voiding    Respiratory:   O2 Device: Room air  Chronic home O2 use?: NO  Incentive spirometer at bedside: NO     GI:     Current diet:  DIET ONE TIME MESSAGE  DIET DIABETIC CONSISTENT CARB Regular; AHA-LOW-CHOL FAT  Passing flatus: YES  Tolerating current diet: YES  % Diet Eaten: 75 %    Pain Management:   Patient states pain is manageable on current regimen: YES    Skin:  Cody Score: 23  Interventions: increase time out of bed    Patient Safety:  Fall Score:  Total Score: 2  Interventions: gripper socks       Length of Stay:  Expected LOS: 2d 14h  Actual LOS: [facility]

## 2021-01-07 NOTE — PROGRESS NOTES
Gave patient her scheduled Pulmicort, while taking it patient has SOB and started wheezing.  Told the RN to inform MD.

## 2021-01-07 NOTE — PROGRESS NOTES
Physician Progress Note      Concha Villagomez  CSN #:                  [id number]  :                       1945  ADMIT DATE:       2021 3:17 PM  [address] [place] DATE:        2021 10:07 AM  RESPONDING  PROVIDER #:        Varsha Clayton MD          QUERY TEXTDadriss Bloomfield Hospitalist Team:    This patient admitted for NSTEMI. UA was obtained at time of arrival, and found to + Nitrates, Small Leukocyte Est, and 4+ Bacteria. Urine cx. positive for Gram negative rods. possible, please document in the progress notes and discharge summary if you are evaluating and/or treating any of the following: The medical record reflects the following:  Risk Factors: Female hx. of cervical caner, asthma, COPD, DM type 2  Clinical Indicators: UA on  cloudy, positive nitrates, small leuk est., 4+ bacteria, WBC @ 11.8, \"SIRS, HR in the 100s--resp up to 22-24--Urine cultures positive for Gram Negative Rods. Treatment: UA and urine cultures collected, daily labs , Pt started on Ciptro -    Thank you,  Mehran Voca, [place], [address]  Options provided:  -- Urinary Tract Infection (UTI)  -- Bacteriuria  -- The UA results are clinical insignificant  -- Other - I will add my own diagnosis  -- Disagree - Not applicable / Not valid  -- Disagree - Clinically unable to determine / Unknown  -- Refer to Clinical Documentation Reviewer    PROVIDER RESPONSE TEXT:    This patient has a UTI.     Query created by: Yuki Worthy on 2021 9:56 AM      Electronically signed by:  Varsha Clayton MD 2021 11:16 AM

## 2021-01-07 NOTE — PROGRESS NOTES
NADIRA: Home with follow up appointments (Cardiology and PCP appointments on AVS)   Pt has new LifeVest, delivered and fitted   2nd IM Medicare letter given to patient, explained, signed and copy placed on medical record    to transport home at 10:00am    No other Care Management needs identified at this time. Pt ready to discharge from a CM standpoint. Care Management Interventions  PCP Verified by CM: Yes(2 monhts ago)  Mode of Transport at Discharge: Self(Spouse Glo Dao [phone] will transport)  Transition of Care Consult (CM Consult): Discharge Planning(Pt had Op PT for balance issues)  Discharge Durable Medical Equipment: (Pt is very independent, uses Nebulizer, and inhaler)  Current Support Network: Lives with Spouse(Single level home has 3 steps at entrance)  Confirm Follow Up Transport: Family(Pt does drive independently)  Discharge Location  Discharge Placement: Home with outpatient services(Cardiac rehab)    Shay Griffiths.  Elaine Ulrich, [address] - [address] [place]  Advanced Steps ACP Facilitator  Zone Phone: [phone]

## 2021-01-07 NOTE — PROGRESS NOTES
Problem: Breathing Pattern - Ineffective  Goal: *Absence of hypoxia  Outcome: Progressing Towards Goal  Goal: *Use of effective breathing techniques  Outcome: Progressing Towards Goal     Problem: Diabetes Self-Management  Goal: *Disease process and treatment process  Description: Define diabetes and identify own type of diabetes; list 3 options for treating diabetes. Outcome: Progressing Towards Goal  Goal: *Monitoring blood glucose, interpreting and using results  Description: Identify recommended blood glucose targets  and personal targets.   Outcome: Progressing Towards Goal     Problem: Patient Education: Go to Patient Education Activity  Goal: Patient/Family Education  Outcome: Progressing Towards Goal

## 2021-01-07 NOTE — PROGRESS NOTES
[address]  [phone]      Cardiology Progress Note      1/7/2021 11:37 AM    Admit Date: 1/4/2021    Admit Diagnosis:   NSTEMI (non-ST elevated myocardial infarction) ([facility] [address] [I21.4]  Asthma exacerbation [J45.901]  SIRS (systemic inflammatory response syndrome) ([facility]) [R65.10]    Subjective:     Lloyd Evans has no c/o CP, SOB. Lifevest fit last evening. Plan for discharge to home.         Visit Vitals  BP (!) 134/55   Pulse 70   Temp 97.6 °F (36.4 °C)   Resp 20   Ht 5' 4\" (1.626 m)   Wt 179 lb (81.2 kg)   SpO2 95%   BMI 30.73 kg/m²       Current Facility-Administered Medications   Medication Dose Route Frequency    insulin glargine (LANTUS) injection 5 Units  5 Units SubCUTAneous BID    insulin lispro (HUMALOG) injection   SubCUTAneous AC&HS    aspirin delayed-release tablet 81 mg  81 mg Oral DAILY    losartan (COZAAR) tablet 25 mg  25 mg Oral DAILY    glimepiride (AMARYL) tablet 2 mg  2 mg Oral 7am    famotidine (PEPCID) tablet 20 mg  20 mg Oral DAILY    0.9% sodium chloride infusion  50 mL/hr IntraVENous CONTINUOUS    carvediloL (COREG) tablet 6.25 mg  6.25 mg Oral BID WITH MEALS    nitroglycerin (NITROSTAT) tablet 0.4 mg  0.4 mg SubLINGual Q5MIN PRN    albuterol (PROVENTIL HFA, VENTOLIN HFA, PROAIR HFA) inhaler 1-2 Puff  1-2 Puff Inhalation Q4H RT    atorvastatin (LIPITOR) tablet 20 mg  20 mg Oral DAILY    buPROPion XL (WELLBUTRIN XL) tablet 300 mg  300 mg Oral DAILY    sertraline (ZOLOFT) tablet 150 mg  150 mg Oral DAILY    cyanocobalamin (VITAMIN B12) tablet 1,000 mcg  1,000 mcg Oral DAILY    methylPREDNISolone (PF) (Solu-MEDROL) injection 40 mg  40 mg IntraVENous Q8H    glucose chewable tablet 16 g  4 Tab Oral PRN    dextrose (D50W) injection syrg 12.5-25 g  12.5-25 g IntraVENous PRN    glucagon (GLUCAGEN) injection 1 mg  1 mg IntraMUSCular PRN    budesonide (PULMICORT) 500 mcg/2 ml nebulizer suspension  500 mcg Nebulization BID RT     Current Outpatient Medications   Medication Sig    valsartan (DIOVAN) 40 mg tablet Take 1 Tab by mouth daily.  carvediloL (COREG) 6.25 mg tablet Take 1 Tab by mouth two (2) times daily (with meals).  predniSONE (DELTASONE) 10 mg tablet Take 10 mg by mouth daily. 4 tabs for 3 days   3 tabs for 3 days   2 tabs for 3 days  1 tabs for 3 days  Then stop.  ciprofloxacin HCl (CIPRO) 500 mg tablet Take 1 Tab by mouth two (2) times a day.  famotidine (Pepcid) 20 mg tablet Take 1 Tab by mouth two (2) times a day.  buPROPion XL (WELLBUTRIN XL) 300 mg XL tablet Take 300 mg by mouth every morning.  glimepiride (AMARYL) 2 mg tablet Take 2 mg by mouth every morning.  atorvastatin (LIPITOR) 20 mg tablet Take 20 mg by mouth daily.  aspirin delayed-release 81 mg tablet Take 81 mg by mouth daily.  dextromethorphan-guaiFENesin (ROBITUSSIN-DM)  mg/5 mL syrup Take 10 mL by mouth every four (4) hours as needed for Cough.  cholecalciferol (VITAMIN D3) 1,000 unit tablet Take 1,000 Units by mouth daily.  cyanocobalamin 1,000 mcg tablet Take 1,000 mcg by mouth daily.  albuterol-ipratropium (DUONEB) 2.5 mg-0.5 mg/3 ml nebulizer solution 3 mL by Nebulization route every four (4) hours as needed for Wheezing.  ALPRAZolam (XANAX) 0.5 mg tablet Take 0.5 mg by mouth two (2) times daily as needed.  albuterol (PROVENTIL, VENTOLIN) 90 mcg/actuation inhaler Take 1-2 Puffs by inhalation every four (4) hours as needed for Wheezing.  sertraline (ZOLOFT) 100 mg tablet Take 150 mg by mouth daily. Objective:      Physical Exam:  General Appearance:  WNWD older  female in no acute distress  Chest:   Clear  Cardiovascular:  Regular rate and rhythm, no murmur. Abdomen:   Soft, non-tender, bowel sounds are active. Extremities: no peripheral edema; right radial site D/I, no hematoma  Skin:  Warm and dry.      Data Review:   Recent Labs     01/07/21  0352 01/06/21  0620 01/04/21  1655   WBC 8.5 11.8* 9. 1   HGB 10.5* 11.1* 11.9   HCT 32.6* 34.0* 36.5    169 151     Recent Labs     01/07/21  0352 01/06/21  0620 01/04/21  1823 01/04/21  1655    135*  --  136   K 3.8 3.7  --  3.6    104  --  104   CO2 25 24  --  27   * 315*  --  248*   BUN 29* 33*  --  20   CREA 0.89 1.15*  --  0.98   CA 8.6 8.8  --  8.8   ALB 3.3* 3.4*  --  3.5   TBILI 0.7 0.6  --  0.7   ALT 30 34  --  27   INR  --   --  1.0  --        Recent Labs     01/05/21  0150 01/04/21  1900 01/04/21  1655   TROIQ 2.41* 1.59* 0.87*       No intake or output data in the 24 hours ending 01/07/21 1101     Telemetry: SR    Echo 1/5/2021  · LV: Estimated LVEF is 25 - 30%. Normal cavity size. Mild concentric hypertrophy. Moderate-to-severely reduced systolic function. Age-appropriate left ventricular diastolic function. · There is severe hypokinesis of the mid to distal portions of all walls, dyskinesis of the apex, and relatively normal function of the basal segments. Assessment:     Principal Problem:    NSTEMI (non-ST elevated myocardial infarction) ([facility] (2/20/2013)      Overview: Recurrent January 2021    Active Problems:    DM (diabetes mellitus) ([facility] ()      SIRS (systemic inflammatory response syndrome) ([facility] (1/4/2021)      Asthma exacerbation (1/4/2021)      Takotsubo cardiomyopathy (1/5/2021)      S/P cardiac cath (1/5/2021)      Overview: 1/5/2021 normal cors              Plan:     NSTEMI:  S/p cardiac cath with normal cors  Elevated troponin likely r/t Takotsubo  Continue with ASA, Coreg, statin    Takotsubo Cardiomyopathy, EF 25-30%:  NTproBNP 266  Stable, not volume overloaded, Cxray clear  Continue on Coreg 6.25mg BID and ARB. Lifevest x 3 months, then will have a repeat echo. If EF <35%, will recommend AICD. Provided patient educational information reference management of heart failure. OK for discharge with f/u to Dr. Anthony Aragon 1-2 weeks.          aJvad Rosenberg ACN    Patient seen and examined by me with the above nurse practitioner. I personally performed all components of the history, physical, and medical decision making and agree with the assessment and plan with minor modifications as noted. Life vest and meds as noted. F/u In office.

## 2021-01-07 NOTE — CARDIO/PULMONARY
Cardiac Rehab Note: chart review Consult has been acknowledged Smoking history assessed. Patient is a non smoker. Smoking Cessation Program link has not been added to the AVS. EF 25-30% on 1/5/21 per cardiac cath. MI education folder, heart attack, heart healthy diet, warning signs, heart facts and outpatient cardiac rehab program to Noreen Villarreal on 1/6/21. Per patient, she has previously received heart failure book. Patient being discharged with life vest today. Emphasized the value of cardiac rehab. Discussed Cardiac Rehab Program format, benefits, and encouraged enrollment to assist with risk modification and management. Initial Cardiac Rehab Intake appointment scheduled for 1/20/21 at 8am and appointment information is on the AVS.  Patient provided orientation packet, instructed to complete packet a couple days prior to appointment, wear gym appropriate clothing and shoes, and bring current list of medications. Patient is to call if unable to keep appointment, need to reschedule or additional questions. Noreen Villarreal verbalized understanding with questions answered.

## 2021-01-07 NOTE — PROGRESS NOTES
Reason for Admission:   NSTEMI                   RUR Score:     15               Plan for utilizing home health:      No    PCP: First and Last name:  Taya Patrick   Name of Practice:    Are you a current patient: Yes/No: Yes Approximate date of last visit: 2 month ago   Can you participate in a virtual visit with your PCP: Yes                    Current Advanced Directive/Advance Care Plan: No ACP docs, not interested at this time. Transition of Care Plan:    Cardiac rehab (OP)        Care Management Interventions  PCP Verified by CM: Yes(2 monhts ago)  Mode of Transport at Discharge: Self(Spouse Holly Amezquita [phone] will transport)  Transition of Care Consult (CM Consult): Discharge Planning(Pt had Op PT for balance issues)  Discharge Durable Medical Equipment: (Pt is very independent, uses Nebulizer, and inhaler)  Current Support Network: Lives with Spouse(Single level home has 3 steps at entrance)  Confirm Follow Up Transport: Family(Pt does drive independently)  Discharge Location  Discharge Placement: Home with outpatient services(Cardiac rehab)    Spoke to pt's daughter and completed Cm's initial assessment. Pt is very independent. Uses [facility] pharmacy. Spouse will transport pt back to home. Pt is waiting for life vest to arrive. Pt need cardiac rehab OP  services.      Violeta Payne MSW  ED Case Manager   [phone]

## 2021-01-08 LAB
BACTERIA SPEC CULT: ABNORMAL
CC UR VC: ABNORMAL
SERVICE CMNT-IMP: ABNORMAL

## 2021-01-15 ENCOUNTER — OFFICE VISIT (OUTPATIENT)
Dept: CARDIOLOGY CLINIC | Age: 76
End: 2021-01-15
Payer: MEDICARE

## 2021-01-15 VITALS
HEART RATE: 72 BPM | WEIGHT: 179 LBS | HEIGHT: 65 IN | BODY MASS INDEX: 29.82 KG/M2 | SYSTOLIC BLOOD PRESSURE: 145 MMHG | RESPIRATION RATE: 14 BRPM | DIASTOLIC BLOOD PRESSURE: 85 MMHG

## 2021-01-15 DIAGNOSIS — I25.118 CORONARY ARTERY DISEASE OF NATIVE ARTERY OF NATIVE HEART WITH STABLE ANGINA PECTORIS (HCC): ICD-10-CM

## 2021-01-15 DIAGNOSIS — Z09 HOSPITAL DISCHARGE FOLLOW-UP: ICD-10-CM

## 2021-01-15 DIAGNOSIS — J44.9 CHRONIC OBSTRUCTIVE PULMONARY DISEASE, UNSPECIFIED COPD TYPE (HCC): ICD-10-CM

## 2021-01-15 DIAGNOSIS — I21.4 NSTEMI (NON-ST ELEVATED MYOCARDIAL INFARCTION) (HCC): ICD-10-CM

## 2021-01-15 DIAGNOSIS — Z98.890 S/P CARDIAC CATHETERIZATION: Primary | ICD-10-CM

## 2021-01-15 DIAGNOSIS — I51.81 TAKOTSUBO CARDIOMYOPATHY: ICD-10-CM

## 2021-01-15 PROCEDURE — 1101F PT FALLS ASSESS-DOCD LE1/YR: CPT | Performed by: INTERNAL MEDICINE

## 2021-01-15 PROCEDURE — G8400 PT W/DXA NO RESULTS DOC: HCPCS | Performed by: INTERNAL MEDICINE

## 2021-01-15 PROCEDURE — 1111F DSCHRG MED/CURRENT MED MERGE: CPT | Performed by: INTERNAL MEDICINE

## 2021-01-15 PROCEDURE — G8419 CALC BMI OUT NRM PARAM NOF/U: HCPCS | Performed by: INTERNAL MEDICINE

## 2021-01-15 PROCEDURE — G8536 NO DOC ELDER MAL SCRN: HCPCS | Performed by: INTERNAL MEDICINE

## 2021-01-15 PROCEDURE — 1090F PRES/ABSN URINE INCON ASSESS: CPT | Performed by: INTERNAL MEDICINE

## 2021-01-15 PROCEDURE — G8754 DIAS BP LESS 90: HCPCS | Performed by: INTERNAL MEDICINE

## 2021-01-15 PROCEDURE — G8753 SYS BP > OR = 140: HCPCS | Performed by: INTERNAL MEDICINE

## 2021-01-15 PROCEDURE — G8432 DEP SCR NOT DOC, RNG: HCPCS | Performed by: INTERNAL MEDICINE

## 2021-01-15 PROCEDURE — 3017F COLORECTAL CA SCREEN DOC REV: CPT | Performed by: INTERNAL MEDICINE

## 2021-01-15 PROCEDURE — 99495 TRANSJ CARE MGMT MOD F2F 14D: CPT | Performed by: INTERNAL MEDICINE

## 2021-01-15 PROCEDURE — G8427 DOCREV CUR MEDS BY ELIG CLIN: HCPCS | Performed by: INTERNAL MEDICINE

## 2021-01-15 RX ORDER — VALSARTAN 40 MG/1
80 TABLET ORAL DAILY
Qty: 90 TAB | Refills: 3 | Status: SHIPPED | OUTPATIENT
Start: 2021-01-15 | End: 2021-09-02

## 2021-01-15 NOTE — PROGRESS NOTES
[address], [address] [facility]  [phone]     Subjective:      Demetrio Maher is a 77 y.o. female is here for hospital follow up where she was admitted 1/4/2021 -1/7/2021  For NSTEMI, underwent cardiac cath which revealed minimal nonobstructive CAD, findings c/w Takotsubo CM. She was discharged on appropriate therapy. Today,      The patient denies chest pain/ shortness of breath, orthopnea, PND, LE edema, palpitations, syncope, or presyncope. Echocardiogram 1/5/2021  · Contrast used: DEFINITY. · LV: Estimated LVEF is 25 - 30%. Normal cavity size. Mild concentric hypertrophy. Moderate-to-severely reduced systolic function. Age-appropriate left ventricular diastolic function. · There is severe hypokinesis of the mid to distal portions of all walls, dyskinesis of the apex, and relatively normal function of the basal segments. Cardiac catheterization 1/5/2021    · Right radial artery and brachial vein access. · Mildly elevated right atrial pressure, normal pulmonary artery pressure, mildly elevated pulmonary capillary wedge pressure. · Moderately reduced pulmonary artery oxygen saturation, cardiac output and index. · Minimal, nonobstructive CAD. · Severely reduced left ventricular systolic function, estimated ejection fraction of 25 to 30%. · There is severe hypokinesis of the mid to distal portions of all walls, dyskinesis of the apex, and relatively normal function of the basal segments.   · Findings are likely consistent with Takotsubo cardiomyopathy      Patient Active Problem List    Diagnosis Date Noted    Takotsubo cardiomyopathy 01/05/2021    S/P cardiac cath 01/05/2021    SIRS (systemic inflammatory response syndrome) ([facility] [address] 01/04/2021    Asthma exacerbation 01/04/2021    Respiratory failure ([facility] [address] 05/10/2018    CAD (coronary artery disease), native coronary artery 02/22/2013    Hyperlipidemia LDL goal < 70 02/22/2013    NSTEMI (non-ST elevated myocardial infarction) ([facility] 02/20/2013    Acute respiratory failure ([facility] 02/19/2013    Unspecified asthma, with exacerbation 02/19/2013    DM (diabetes mellitus) ([facility]     Other and unspecified hyperlipidemia     Unspecified essential hypertension       Lindsey Garcia MD  Past Medical History:   Diagnosis Date    Asthma     Cervical cancer ([facility] 1981    COPD     Depression with anxiety     Diabetes ([facility]     currently on no meds    Other and unspecified hyperlipidemia     S/P cardiac cath 1/5/2021 1/5/2021 normal cors     Takotsubo cardiomyopathy 1/5/2021    Type II or unspecified type diabetes mellitus without mention of complication, uncontrolled     A1c 9.2 8/2012;  used to see Dr. Shemar Esquivle essential hypertension       Past Surgical History:   Procedure Laterality Date    CARDIAC CATHETERIZATION  2/21/2013         HX APPENDECTOMY      HX CERVICAL FUSION      HX AURY AND BSO      for cervical cancer     Allergies   Allergen Reactions    Metformin Nausea and Vomiting    Ace Inhibitors Cough      Family History   Problem Relation Age of Onset    Heart Disease Neg Hx     Stroke Neg Hx       Social History     Socioeconomic History    Marital status:      Spouse name: Not on file    Number of children: Not on file    Years of education: Not on file    Highest education level: Not on file   Occupational History    Occupation:      Employer: SELF EMPLOYED   Social Needs    Financial resource strain: Not on file    Food insecurity     Worry: Not on file     Inability: Not on file    Transportation needs     Medical: Not on file     Non-medical: Not on file   Tobacco Use    Smoking status: Never Smoker    Smokeless tobacco: Never Used    Tobacco comment: exposed to second hand smoking in past   Substance and Sexual Activity    Alcohol use: No     Comment: rarely a mixed drink    Drug use: No    Sexual activity: Not on file Lifestyle    Physical activity     Days per week: Not on file     Minutes per session: Not on file    Stress: Not on file   Relationships    Social connections     Talks on phone: Not on file     Gets together: Not on file     Attends Restorationism service: Not on file     Active member of club or organization: Not on file     Attends meetings of clubs or organizations: Not on file     Relationship status: Not on file    Intimate partner violence     Fear of current or ex partner: Not on file     Emotionally abused: Not on file     Physically abused: Not on file     Forced sexual activity: Not on file   Other Topics Concern    Not on file   Social History Narrative    Lives in [facility] with  of 11 years.  (2nd marriage). Has 2 daughters and 1 son from a previous marriage. Works as a  for [employer]. Likes to volunteer at the [employer]. Current Outpatient Medications   Medication Sig    valsartan (DIOVAN) 40 mg tablet Take 2 Tabs by mouth daily.  carvediloL (COREG) 6.25 mg tablet Take 1 Tab by mouth two (2) times daily (with meals).  predniSONE (DELTASONE) 10 mg tablet Take 10 mg by mouth daily. 4 tabs for 3 days   3 tabs for 3 days   2 tabs for 3 days  1 tabs for 3 days  Then stop.  famotidine (Pepcid) 20 mg tablet Take 1 Tab by mouth two (2) times a day.  buPROPion XL (WELLBUTRIN XL) 300 mg XL tablet Take 300 mg by mouth every morning.  glimepiride (AMARYL) 2 mg tablet Take 2 mg by mouth every morning.  atorvastatin (LIPITOR) 20 mg tablet Take 20 mg by mouth daily.  aspirin delayed-release 81 mg tablet Take 81 mg by mouth daily.  dextromethorphan-guaiFENesin (ROBITUSSIN-DM)  mg/5 mL syrup Take 10 mL by mouth every four (4) hours as needed for Cough.  cholecalciferol (VITAMIN D3) 1,000 unit tablet Take 1,000 Units by mouth daily.  cyanocobalamin 1,000 mcg tablet Take 1,000 mcg by mouth daily.     albuterol-ipratropium (DUONEB) 2.5 mg-0.5 mg/3 ml nebulizer solution 3 mL by Nebulization route every four (4) hours as needed for Wheezing.  ALPRAZolam (XANAX) 0.5 mg tablet Take 0.5 mg by mouth two (2) times daily as needed.  albuterol (PROVENTIL, VENTOLIN) 90 mcg/actuation inhaler Take 1-2 Puffs by inhalation every four (4) hours as needed for Wheezing.  sertraline (ZOLOFT) 100 mg tablet Take 150 mg by mouth daily. No current facility-administered medications for this visit. Review of Symptoms:  11 systems reviewed, negative other than as stated in the HPI    Physical ExamPhysical Exam:    Vitals:    01/15/21 1457   BP: (!) 145/85   Pulse: 72   Resp: 14   Weight: 179 lb (81.2 kg)   Height: 5' 5\" (1.651 m)     Body mass index is 29.79 kg/m². General PE  Gen:  NAD  Mental Status - Alert. General Appearance - Not in acute distress. HEENT:  PERRL, no carotid bruits or JVD  Chest and Lung Exam   Inspection: Accessory muscles - No use of accessory muscles in breathing. Auscultation:   Breath sounds: - Normal.   Cardiovascular   Inspection: Jugular vein - Bilateral - Inspection Normal.   Palpation/Percussion:   Apical Impulse: - Normal.   Auscultation: Rhythm - Regular. Heart Sounds - S1 WNL and S2 WNL. No S3 or S4. Murmurs & Other Heart Sounds: Auscultation of the heart reveals - No Murmurs. Peripheral Vascular   Upper Extremity: Inspection - Bilateral - No Cyanotic nailbeds or Digital clubbing. Lower Extremity:   Palpation: Edema - Bilateral - No edema. Abdomen:   Soft, non-tender, bowel sounds are active.   Neuro: A&O times 3, CN and motor grossly WNL    Labs:   Lab Results   Component Value Date/Time    Cholesterol, total 204 (H) 01/05/2021 06:27 AM    Cholesterol, total 213 (H) 08/13/2012 09:09 AM    Cholesterol, total 237 (H) 05/14/2012 09:36 AM    Cholesterol, total 237 (H) 09/02/2011 09:49 AM    HDL Cholesterol 83 01/05/2021 06:27 AM    HDL Cholesterol 48 08/13/2012 09:09 AM    HDL Cholesterol 92 05/14/2012 09:36 AM    HDL Cholesterol 60 09/02/2011 09:49 AM    LDL, calculated 112.2 (H) 01/05/2021 06:27 AM    LDL, calculated 127 (H) 08/13/2012 09:09 AM    LDL, calculated 110 (H) 05/14/2012 09:36 AM    LDL, calculated 151 (H) 09/02/2011 09:49 AM    Triglyceride 44 01/05/2021 06:27 AM    Triglyceride 190 (H) 08/13/2012 09:09 AM    Triglyceride 175 (H) 05/14/2012 09:36 AM    Triglyceride 132 09/02/2011 09:49 AM    CHOL/HDL Ratio 2.5 01/05/2021 06:27 AM     Lab Results   Component Value Date/Time     04/28/2020 01:23 AM     Lab Results   Component Value Date/Time    Sodium 136 01/07/2021 03:52 AM    Potassium 3.8 01/07/2021 03:52 AM    Chloride 105 01/07/2021 03:52 AM    CO2 25 01/07/2021 03:52 AM    Anion gap 6 01/07/2021 03:52 AM    Glucose 231 (H) 01/07/2021 03:52 AM    BUN 29 (H) 01/07/2021 03:52 AM    Creatinine 0.89 01/07/2021 03:52 AM    BUN/Creatinine ratio 33 (H) 01/07/2021 03:52 AM    GFR est AA >60 01/07/2021 03:52 AM    GFR est non-AA >60 01/07/2021 03:52 AM    Calcium 8.6 01/07/2021 03:52 AM    Bilirubin, total 0.7 01/07/2021 03:52 AM    Alk. phosphatase 68 01/07/2021 03:52 AM    Protein, total 6.6 01/07/2021 03:52 AM    Albumin 3.3 (L) 01/07/2021 03:52 AM    Globulin 3.3 01/07/2021 03:52 AM    A-G Ratio 1.0 (L) 01/07/2021 03:52 AM    ALT (SGPT) 30 01/07/2021 03:52 AM          Assessment:     Assessment:      1. S/P cardiac catheterization    2. Takotsubo cardiomyopathy    3. NSTEMI (non-ST elevated myocardial infarction) ([address]    4. Coronary artery disease of native artery of native heart with stable angina pectoris ([facility] [address]    5. Chronic obstructive pulmonary disease, unspecified COPD type ([facility] [address]        Orders Placed This Encounter    valsartan (DIOVAN) 40 mg tablet     Sig: Take 2 Tabs by mouth daily.      Dispense:  90 Tab     Refill:  3        Plan:     Takotsubo CM  EF 25-30% no significant valve issues per echo 1/2021  Continue with ASA, Coreg, statin, increase valsartan today to 80 mg for slightly elevated blood pressure 145/85. The patient will monitor BP at home and call if generally >140/85. Lifevest x 3 months, then will have a repeat echo. If EF <35%, will recommend AICD. Provided patient educational information reference management of heart failure    NSTEMI  Minimal, nonobstructive CAD per cardiac catheterization 1/2021: Mena [facility] LAD to Prox LAD lesion, 20% stenosed. On BB, statin    HTN  Slightly elevated today, increasing valsartan as noted. HLD  1/2021  On statin Labs and lipids per PCP      DM  On oral agent        Continue current care and f/u on approximately April 5 for repeat 3-month echo, then follow-up with me.     Hardik Schmitt MD

## 2021-01-15 NOTE — LETTER
1/15/2021 Patient: Mercy Dietz YOB: 1945 Date of Visit: 1/15/2021 Kwadwo Davies MD 
[address] [place] [address] [address] [address] Fax: [phone] Dear Kwadwo Davies MD, Thank you for referring Ms. Mercy Dietz to [facility] for evaluation. My notes for this consultation are attached. If you have questions, please do not hesitate to call me. I look forward to following your patient along with you.  
 
 
Sincerely, 
 
David Hernandez MD

## 2021-01-20 ENCOUNTER — HOSPITAL ENCOUNTER (OUTPATIENT)
Dept: CARDIAC REHAB | Age: 76
Discharge: HOME | End: 2021-01-20
Payer: MEDICARE

## 2021-01-20 VITALS — HEIGHT: 64 IN | BODY MASS INDEX: 30.9 KG/M2 | WEIGHT: 181 LBS

## 2021-01-20 PROCEDURE — 93798 PHYS/QHP OP CAR RHAB W/ECG: CPT

## 2021-01-20 NOTE — CARDIO/PULMONARY
Cardiopulmonary Rehab Nursing Entry:     Patient reported for cardiac rehab orientation. Patient is a 77year old referred by Dr. Renuka Lira for strengthening and conditioning. Patient status post NSTEMI 01/04/2021. PMHx significant for HTN, high cholesterol,takotsubo cardiomyopathy, DM, hyperlipidemia, asthma and sedentary lifestyle. EF 25-30%. She is wearing a lifevest.        Psychosocial:Patient is  with three adult children and over 20 grandchildren. She works part time as a . Her PHQ 9 score is 12 and that was faxed to her physician. She reads and plays word games for relaxation. Pt completed the 6 minute walk test on the treadmill without stops walking 250m, 2.2 mets. SR/BBB/ST  BS CTA  Non-productive cough  Pulse ox 96% RA  No pedal edema  VSS     Goals as follows:  Improve Rate Your Plate   Begin home walking routine. Get rid of lifevest!!! Pt was given CR book and was scheduled to begin classes and exercise.

## 2021-01-22 ENCOUNTER — APPOINTMENT (OUTPATIENT)
Dept: CARDIAC REHAB | Age: 76
End: 2021-01-22
Payer: MEDICARE

## 2021-01-27 ENCOUNTER — APPOINTMENT (OUTPATIENT)
Dept: CARDIAC REHAB | Age: 76
End: 2021-01-27
Payer: MEDICARE

## 2021-02-03 ENCOUNTER — APPOINTMENT (OUTPATIENT)
Dept: CARDIAC REHAB | Age: 76
End: 2021-02-03
Payer: MEDICARE

## 2021-02-12 ENCOUNTER — APPOINTMENT (OUTPATIENT)
Dept: CARDIAC REHAB | Age: 76
End: 2021-02-12
Payer: MEDICARE

## 2021-02-24 ENCOUNTER — HOSPITAL ENCOUNTER (OUTPATIENT)
Dept: CARDIAC REHAB | Age: 76
Discharge: HOME | End: 2021-02-24
Payer: MEDICARE

## 2021-02-24 VITALS — BODY MASS INDEX: 31.58 KG/M2 | WEIGHT: 184 LBS

## 2021-02-24 PROCEDURE — 93798 PHYS/QHP OP CAR RHAB W/ECG: CPT

## 2021-02-24 PROCEDURE — 93797 PHYS/QHP OP CAR RHAB WO ECG: CPT

## 2021-02-26 ENCOUNTER — HOSPITAL ENCOUNTER (OUTPATIENT)
Dept: CARDIAC REHAB | Age: 76
Discharge: HOME | End: 2021-02-26
Payer: MEDICARE

## 2021-02-26 VITALS — WEIGHT: 184.4 LBS | BODY MASS INDEX: 31.65 KG/M2

## 2021-02-26 PROCEDURE — 93798 PHYS/QHP OP CAR RHAB W/ECG: CPT

## 2021-03-03 ENCOUNTER — HOSPITAL ENCOUNTER (OUTPATIENT)
Dept: CARDIAC REHAB | Age: 76
Discharge: HOME | End: 2021-03-03
Payer: MEDICARE

## 2021-03-03 VITALS — WEIGHT: 184.6 LBS | BODY MASS INDEX: 31.69 KG/M2

## 2021-03-03 PROCEDURE — 93798 PHYS/QHP OP CAR RHAB W/ECG: CPT

## 2021-03-03 PROCEDURE — 93797 PHYS/QHP OP CAR RHAB WO ECG: CPT

## 2021-03-10 ENCOUNTER — TELEPHONE (OUTPATIENT)
Dept: CARDIOLOGY CLINIC | Age: 76
End: 2021-03-10

## 2021-03-10 NOTE — TELEPHONE ENCOUNTER
Patient was at cardiac rehab this am stated she felt a hard push from her life vest yesterday went to [address] [facility] office they checked her BP said she was ok / ask patient who she was seen by she couldn't verify ,cardiac rehab requesting clearance to resume this therapy have records sent over from life vest to review she stated patient has not been wearing all the time she will call and check on fit and any questions with usage of the life vest. Please advise about cardiac rehab?

## 2021-04-05 ENCOUNTER — TELEPHONE (OUTPATIENT)
Dept: CARDIOLOGY CLINIC | Age: 76
End: 2021-04-05

## 2021-04-14 ENCOUNTER — TELEPHONE (OUTPATIENT)
Dept: CARDIAC REHAB | Age: 76
End: 2021-04-14

## 2021-04-14 ENCOUNTER — HOSPITAL ENCOUNTER (OUTPATIENT)
Dept: CARDIAC REHAB | Age: 76
End: 2021-04-14

## 2021-04-14 NOTE — TELEPHONE ENCOUNTER
Spoke with patient, she will not be retuning. Too much with work and didn't feel like she was getting anything out of it. Patient had three exercises sessions. Chart broken down.

## 2021-06-17 ENCOUNTER — TRANSCRIBE ORDER (OUTPATIENT)
Dept: SCHEDULING | Age: 76
End: 2021-06-17

## 2021-06-17 DIAGNOSIS — R60.9 EDEMA: Primary | ICD-10-CM

## 2021-06-18 ENCOUNTER — HOSPITAL ENCOUNTER (OUTPATIENT)
Dept: ULTRASOUND IMAGING | Age: 76
Discharge: HOME | End: 2021-06-18
Attending: INTERNAL MEDICINE
Payer: MEDICARE

## 2021-06-18 DIAGNOSIS — R60.9 EDEMA: ICD-10-CM

## 2021-06-18 PROCEDURE — 93970 EXTREMITY STUDY: CPT

## 2021-06-27 ENCOUNTER — HOSPITAL ENCOUNTER (EMERGENCY)
Age: 76
Discharge: HOME | End: 2021-06-27
Attending: EMERGENCY MEDICINE
Payer: MEDICARE

## 2021-06-27 ENCOUNTER — APPOINTMENT (OUTPATIENT)
Dept: ULTRASOUND IMAGING | Age: 76
End: 2021-06-27
Attending: PHYSICIAN ASSISTANT
Payer: MEDICARE

## 2021-06-27 ENCOUNTER — APPOINTMENT (OUTPATIENT)
Dept: GENERAL RADIOLOGY | Age: 76
End: 2021-06-27
Attending: PHYSICIAN ASSISTANT
Payer: MEDICARE

## 2021-06-27 VITALS
HEIGHT: 65 IN | WEIGHT: 198.19 LBS | TEMPERATURE: 97.8 F | HEART RATE: 73 BPM | DIASTOLIC BLOOD PRESSURE: 63 MMHG | SYSTOLIC BLOOD PRESSURE: 137 MMHG | RESPIRATION RATE: 15 BRPM | OXYGEN SATURATION: 96 % | BODY MASS INDEX: 33.02 KG/M2

## 2021-06-27 DIAGNOSIS — M25.561 ACUTE PAIN OF RIGHT KNEE: ICD-10-CM

## 2021-06-27 DIAGNOSIS — M71.21 BAKER'S CYST, RIGHT: Primary | ICD-10-CM

## 2021-06-27 PROCEDURE — 73562 X-RAY EXAM OF KNEE 3: CPT

## 2021-06-27 PROCEDURE — 99283 EMERGENCY DEPT VISIT LOW MDM: CPT

## 2021-06-27 PROCEDURE — 93971 EXTREMITY STUDY: CPT

## 2021-06-27 RX ORDER — HYDROCODONE BITARTRATE AND ACETAMINOPHEN 5; 325 MG/1; MG/1
1 TABLET ORAL
Qty: 10 TABLET | Refills: 0 | Status: SHIPPED | OUTPATIENT
Start: 2021-06-27 | End: 2021-06-30

## 2021-06-27 NOTE — ED PROVIDER NOTES
EMERGENCY DEPARTMENT HISTORY AND PHYSICAL EXAM      Date: 6/27/2021  Patient Name: Brigido Limon    History of Presenting Illness     Chief Complaint   Patient presents with    Leg Pain     Ambulatory into the ED with c/o RLE pain and swelling - pain mostly behind the knee. Sx x 3 weeks. Had a venous duplex to BLE on 6/18 - negative for DVT, but report shows possible Baker's Cyst to LLE (no mention on the Rt)       History Provided By: Patient    HPI: Brigido Limon, 77 y.o. female with PMHx of HTN, COPD, anxiety, presents BIB self to the ED with cc of atraumatic RLE pain x 3 weeks. Pain has become acutely worse over the last few days. Pain is felt at the medial and posterior aspect of the R knee, constant, worse with getting up from a seated position. She c/o intermittent pain and swelling to the bilateral lower extremities for several weeks, for which she had a bilateral Doppler done outpatient on 6/17 (neg for DVT). She was evaluated by PCP today and referred to the ED for repeat ultrasound of the right lower extremity due to her worsening symptoms. She denies recent surgeries, flights, immobilizations, history of blood clots, exogenous hormones, smoking. There are no other complaints, changes, or physical findings at this time. PCP: Prema Lawrence MD    No current facility-administered medications on file prior to encounter. Current Outpatient Medications on File Prior to Encounter   Medication Sig Dispense Refill    valsartan (DIOVAN) 40 mg tablet Take 2 Tabs by mouth daily. 90 Tab 3    carvediloL (COREG) 6.25 mg tablet Take 1 Tab by mouth two (2) times daily (with meals). 60 Tab 11    predniSONE (DELTASONE) 10 mg tablet Take 10 mg by mouth daily. 4 tabs for 3 days   3 tabs for 3 days   2 tabs for 3 days  1 tabs for 3 days  Then stop. 30 Tab 0    famotidine (Pepcid) 20 mg tablet Take 1 Tab by mouth two (2) times a day.  20 Tab 0    buPROPion XL (WELLBUTRIN XL) 300 mg XL tablet Take 300 mg by mouth every morning.  glimepiride (AMARYL) 2 mg tablet Take 2 mg by mouth every morning.  atorvastatin (LIPITOR) 20 mg tablet Take 20 mg by mouth daily.  aspirin delayed-release 81 mg tablet Take 81 mg by mouth daily.  dextromethorphan-guaiFENesin (ROBITUSSIN-DM)  mg/5 mL syrup Take 10 mL by mouth every four (4) hours as needed for Cough.  cholecalciferol (VITAMIN D3) 1,000 unit tablet Take 1,000 Units by mouth daily.  cyanocobalamin 1,000 mcg tablet Take 1,000 mcg by mouth daily.  albuterol-ipratropium (DUONEB) 2.5 mg-0.5 mg/3 ml nebulizer solution 3 mL by Nebulization route every four (4) hours as needed for Wheezing. 1 Package     ALPRAZolam (XANAX) 0.5 mg tablet Take 0.5 mg by mouth two (2) times daily as needed.  albuterol (PROVENTIL, VENTOLIN) 90 mcg/actuation inhaler Take 1-2 Puffs by inhalation every four (4) hours as needed for Wheezing. 17 g 1    sertraline (ZOLOFT) 100 mg tablet Take 150 mg by mouth daily.          Past History     Past Medical History:  Past Medical History:   Diagnosis Date    Asthma     Cervical cancer ([facility] [address] 0    COPD     Depression with anxiety     Diabetes ([facility] [address]     currently on no meds    Other and unspecified hyperlipidemia     S/P cardiac cath 1/5/2021 1/5/2021 normal cors     Takotsubo cardiomyopathy 1/5/2021    Type II or unspecified type diabetes mellitus without mention of complication, uncontrolled     A1c 9.2 8/2012;  used to see Dr. Cheo Villagomez essential hypertension        Past Surgical History:  Past Surgical History:   Procedure Laterality Date    CARDIAC CATHETERIZATION  2/21/2013         HX APPENDECTOMY      HX CERVICAL FUSION      HX AURY AND BSO      for cervical cancer       Family History:  Family History   Problem Relation Age of Onset    Heart Disease Neg Hx     Stroke Neg Hx        Social History:  Social History     Tobacco Use    Smoking status: Never Smoker    Smokeless tobacco: Never Used    Tobacco comment: exposed to second hand smoking in past   Substance Use Topics    Alcohol use: No     Comment: rarely a mixed drink    Drug use: No       Allergies: Allergies   Allergen Reactions    Metformin Nausea and Vomiting    Ace Inhibitors Cough         Review of Systems   Review of Systems   Constitutional: Negative for chills and fever. Musculoskeletal:        Right knee pain   Skin: Negative for rash. Allergic/Immunologic: Negative for immunocompromised state. Neurological: Negative for numbness. Hematological: Does not bruise/bleed easily. All other systems reviewed and are negative. Physical Exam   Physical Exam  Vitals and nursing note reviewed. Constitutional:       General: She is not in acute distress. Appearance: Normal appearance. She is well-developed. She is not toxic-appearing. HENT:      Head: Normocephalic and atraumatic. Nose: Nose normal.      Mouth/Throat:      Mouth: Mucous membranes are moist.   Eyes:      General: Lids are normal.      Extraocular Movements: Extraocular movements intact. Conjunctiva/sclera: Conjunctivae normal.   Cardiovascular:      Rate and Rhythm: Normal rate and regular rhythm. Pulmonary:      Effort: Pulmonary effort is normal.      Breath sounds: Normal breath sounds. Abdominal:      Palpations: Abdomen is soft. Tenderness: There is no abdominal tenderness. Musculoskeletal:      Cervical back: Normal range of motion and neck supple. Right knee: Effusion present. No deformity or erythema. Tenderness (Medial and posterior aspect) present. Normal alignment. Comments: No posterior calf edema, erythema, or palpable cords bilaterally. Neurovascularly intact throughout. Gait is slow but steady and symmetrical.   Skin:     General: Skin is warm and dry. Neurological:      General: No focal deficit present.       Mental Status: She is alert and oriented to person, place, and time.   Psychiatric:         Mood and Affect: Mood normal.         Behavior: Behavior normal. Behavior is cooperative. Diagnostic Study Results     Labs -   No results found for this or any previous visit (from the past 12 hour(s)). Radiologic Studies -   XR KNEE RT 3 V   Final Result   1. No fracture. 2. Joint effusion. 3. Mild osteoarthritis. Lower Extremity Venous Findings    Right Lower Venous    No evidence of deep vein thrombosis in the common femoral, profunda femoral, femoral, popliteal, posterior tibial, and peroneal veins. The veins were imaged in the transverse and longitudinal planes. The vessels showed normal color filling and compressibility. Doppler interrogation showed phasic and spontaneous flow. There is a fluid filled area seen in the right popliteal fossa that could be indicative of a Baker's cyst. Clinical correlation is recommended. Baker's cyst posterior medial right knee 5.2 x 3.6 x 1.6cm         CT Results  (Last 48 hours)    None        CXR Results  (Last 48 hours)    None            Medical Decision Making   I am the first provider for this patient. I reviewed the vital signs, available nursing notes, past medical history, past surgical history, family history and social history. Vital Signs-Reviewed the patient's vital signs. Patient Vitals for the past 12 hrs:   Temp Pulse Resp BP SpO2   06/27/21 1405  73 15 137/63 96 %   06/27/21 1113 97.8 °F (36.6 °C) 62 13 (!) 115/52 99 %       Records Reviewed: Nursing Notes, Old Medical Records and Previous Radiology Studies    Provider Notes (Medical Decision Making):   Reviewed x-ray and ultrasound findings with patient. Baker's cyst noted to be present, which may be contributing to patient's symptoms. No DVT. Patient is agreeable to discharge with plans for follow-up with PCP, Ortho. ED return precautions given. ED Course:   Initial assessment performed.  The patients presenting problems have been discussed, and they are in agreement with the care plan formulated and outlined with them. I have encouraged them to ask questions as they arise throughout their visit. Critical Care Time: None    Disposition:  D/c    PLAN:  1. Discharge Medication List as of 6/27/2021  2:04 PM      CONTINUE these medications which have NOT CHANGED    Details   valsartan (DIOVAN) 40 mg tablet Take 2 Tabs by mouth daily. , Normal, Disp-90 Tab, R-3      carvediloL (COREG) 6.25 mg tablet Take 1 Tab by mouth two (2) times daily (with meals). , Print, Disp-60 Tab, R-11      predniSONE (DELTASONE) 10 mg tablet Take 10 mg by mouth daily. 4 tabs for 3 days   3 tabs for 3 days   2 tabs for 3 days  1 tabs for 3 days  Then stop., Print, Disp-30 Tab, R-0      famotidine (Pepcid) 20 mg tablet Take 1 Tab by mouth two (2) times a day., Print, Disp-20 Tab, R-0      buPROPion XL (WELLBUTRIN XL) 300 mg XL tablet Take 300 mg by mouth every morning., Historical Med      glimepiride (AMARYL) 2 mg tablet Take 2 mg by mouth every morning., Historical Med      atorvastatin (LIPITOR) 20 mg tablet Take 20 mg by mouth daily. , Historical Med      aspirin delayed-release 81 mg tablet Take 81 mg by mouth daily. , Historical Med      dextromethorphan-guaiFENesin (ROBITUSSIN-DM)  mg/5 mL syrup Take 10 mL by mouth every four (4) hours as needed for Cough., Historical Med      cholecalciferol (VITAMIN D3) 1,000 unit tablet Take 1,000 Units by mouth daily. , Historical Med      cyanocobalamin 1,000 mcg tablet Take 1,000 mcg by mouth daily. , Historical Med      albuterol-ipratropium (DUONEB) 2.5 mg-0.5 mg/3 ml nebulizer solution 3 mL by Nebulization route every four (4) hours as needed for Wheezing., Historical Med, Disp-1 Package      ALPRAZolam (XANAX) 0.5 mg tablet Take 0.5 mg by mouth two (2) times daily as needed., Historical Med      albuterol (PROVENTIL, VENTOLIN) 90 mcg/actuation inhaler Take 1-2 Puffs by inhalation every four (4) hours as needed for Wheezing., Print, Disp-17 g, R-1      sertraline (ZOLOFT) 100 mg tablet Take 150 mg by mouth daily. , Historical Med           2. Follow-up Information     Follow up With Specialties Details Why Contact Info    [facility] EMERGENCY DEPT Emergency Medicine  As needed, If symptoms worsen [address] [facility] [address]    Shania Ruffin MD Family Medicine Call  For follow up [facility] [address] [facility]      Anneliese Gold MD Orthopedic Surgery Call  For follow up [address]  [phone]          Return to ED if worse     Diagnosis     Clinical Impression:   1. Baker's cyst, right    2. Acute pain of right knee          Please note that this dictation was completed with [employer], the computer voice recognition software. Quite often unanticipated grammatical, syntax, homophones, and other interpretive errors are inadvertently transcribed by the computer software. Please disregards these errors. Please excuse any errors that have escaped final proofreading.

## 2021-07-02 ENCOUNTER — TRANSCRIBE ORDER (OUTPATIENT)
Dept: SCHEDULING | Age: 76
End: 2021-07-02

## 2021-07-02 DIAGNOSIS — I10 ESSENTIAL HYPERTENSION: Primary | ICD-10-CM

## 2021-07-09 ENCOUNTER — HOSPITAL ENCOUNTER (OUTPATIENT)
Dept: NON INVASIVE DIAGNOSTICS | Age: 76
Discharge: HOME | End: 2021-07-09
Attending: INTERNAL MEDICINE
Payer: MEDICARE

## 2021-07-09 VITALS
BODY MASS INDEX: 33.02 KG/M2 | SYSTOLIC BLOOD PRESSURE: 137 MMHG | HEIGHT: 65 IN | WEIGHT: 198.19 LBS | DIASTOLIC BLOOD PRESSURE: 63 MMHG

## 2021-07-09 DIAGNOSIS — I10 ESSENTIAL HYPERTENSION: ICD-10-CM

## 2021-07-09 PROCEDURE — 93306 TTE W/DOPPLER COMPLETE: CPT

## 2021-07-15 LAB
ECHO AV PEAK GRADIENT: 5.98 MMHG
ECHO AV PEAK VELOCITY: 122.27 CM/S
ECHO LA TO AORTIC ROOT RATIO: 1.18
ECHO LA TO AORTIC ROOT RATIO: 1.18
ECHO LV INTERNAL DIMENSION DIASTOLIC: 4.69 CM (ref 3.9–5.3)
ECHO LV IVSD: 0.89 CM (ref 0.6–0.9)
ECHO LV MASS 2D: 120.8 G (ref 67–162)
ECHO LV MASS INDEX 2D: 61.3 G/M2 (ref 43–95)
ECHO LV POSTERIOR WALL DIASTOLIC: 0.7 CM (ref 0.6–0.9)
ECHO LVOT PEAK GRADIENT: 4.74 MMHG
ECHO LVOT PEAK VELOCITY: 108.88 CM/S
ECHO MV A VELOCITY: 92.44 CM/S
ECHO MV E VELOCITY: 78.04 CM/S
ECHO MV E/A RATIO: 0.84
ECHO RV TAPSE: 2.54 CM (ref 1.5–2)

## 2021-08-03 PROBLEM — E78.5 OTHER AND UNSPECIFIED HYPERLIPIDEMIA: Status: RESOLVED | Noted: 2021-08-03 | Resolved: 2021-08-03

## 2022-03-18 PROBLEM — R65.10 SIRS (SYSTEMIC INFLAMMATORY RESPONSE SYNDROME) (HCC): Status: ACTIVE | Noted: 2021-01-04

## 2022-03-18 PROBLEM — Z98.890 S/P CARDIAC CATH: Status: ACTIVE | Noted: 2021-01-05

## 2022-03-19 PROBLEM — I51.81 TAKOTSUBO CARDIOMYOPATHY: Status: ACTIVE | Noted: 2021-01-05

## 2022-03-19 PROBLEM — J45.901 ASTHMA EXACERBATION: Status: ACTIVE | Noted: 2021-01-04

## 2022-03-20 PROBLEM — J96.90 RESPIRATORY FAILURE (HCC): Status: ACTIVE | Noted: 2018-05-10
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
## (undated) DEVICE — SWAN-GANZ TRUE SIZE THERMODULTION CATHETER, 5F: Brand: SWAN-GANZ TRUE SIZE

## (undated) DEVICE — GUIDEWIRE VASC L260CM 0.035IN J TIP L3MM PTFE FIX COR NAMIC

## (undated) DEVICE — AIRLIFE™  ADULT CUSHION NASAL CANNULA WITH 7 FOOT (2.1 M) CRUSH-RESISTANT OXYGEN TUBING, AND U/CONNECT-IT ADAPTER: Brand: AIRLIFE™

## (undated) DEVICE — GLIDESHEATH SLENDER ACCESS KIT: Brand: GLIDESHEATH SLENDER

## (undated) DEVICE — HI-TORQUE VERSACORE FLOPPY GUIDE WIRE SYSTEM 145 CM: Brand: HI-TORQUE VERSACORE

## (undated) DEVICE — SPLINT WR POS F/ARTERIAL ACC -- BX/10

## (undated) DEVICE — TUBING PRSS MON L6IN PVC M FEM CONN

## (undated) DEVICE — CATHETER ETER ANGIO L110CM OD5FR ID046IN L75CM 038IN 145DEG CARD

## (undated) DEVICE — RADIFOCUS OPTITORQUE ANGIOGRAPHIC CATHETER: Brand: OPTITORQUE

## (undated) DEVICE — KIT ANGIOGRAPHY CUST MRMC

## (undated) DEVICE — SYRINGE ANGIO 10 CC BRL STD PRNT POLYCARB LT BLU MEDALLION

## (undated) DEVICE — TR BAND RADIAL ARTERY COMPRESSION DEVICE: Brand: TR BAND

## (undated) DEVICE — BLADE ASSEMB CLP HAIR FINE --

## (undated) DEVICE — KIT ACCS INTRO 4FR L10CM NDL 21GA L7CM GWIRE L40CM

## (undated) DEVICE — CATH IV AUTOGRD BC GRY 16GA 30 -- INSYTE

## (undated) DEVICE — 3M™ TEGADERM™ TRANSPARENT FILM DRESSING FRAME STYLE, 1626W, 4 IN X 4-3/4 IN (10 CM X 12 CM), 50/CT 4CT/CASE: Brand: 3M™ TEGADERM™

## (undated) DEVICE — SYR POWER 150ML 8IN FILL TUBE --

## (undated) DEVICE — PACK PROCEDURE SURG HRT CATH